# Patient Record
Sex: FEMALE | Race: WHITE | NOT HISPANIC OR LATINO | Employment: FULL TIME
[De-identification: names, ages, dates, MRNs, and addresses within clinical notes are randomized per-mention and may not be internally consistent; named-entity substitution may affect disease eponyms.]

---

## 2017-12-31 ENCOUNTER — HEALTH MAINTENANCE LETTER (OUTPATIENT)
Age: 32
End: 2017-12-31

## 2020-03-10 ENCOUNTER — HEALTH MAINTENANCE LETTER (OUTPATIENT)
Age: 35
End: 2020-03-10

## 2020-12-27 ENCOUNTER — HEALTH MAINTENANCE LETTER (OUTPATIENT)
Age: 35
End: 2020-12-27

## 2021-04-24 ENCOUNTER — HEALTH MAINTENANCE LETTER (OUTPATIENT)
Age: 36
End: 2021-04-24

## 2021-10-04 ENCOUNTER — HEALTH MAINTENANCE LETTER (OUTPATIENT)
Age: 36
End: 2021-10-04

## 2022-04-19 ENCOUNTER — TRANSCRIBE ORDERS (OUTPATIENT)
Dept: OTHER | Age: 37
End: 2022-04-19
Payer: COMMERCIAL

## 2022-04-19 DIAGNOSIS — G89.29 CHRONIC PAIN OF RIGHT KNEE: Primary | ICD-10-CM

## 2022-04-19 DIAGNOSIS — M25.561 CHRONIC PAIN OF RIGHT KNEE: Primary | ICD-10-CM

## 2022-05-15 ENCOUNTER — HEALTH MAINTENANCE LETTER (OUTPATIENT)
Age: 37
End: 2022-05-15

## 2022-09-11 ENCOUNTER — HEALTH MAINTENANCE LETTER (OUTPATIENT)
Age: 37
End: 2022-09-11

## 2023-04-13 ENCOUNTER — PRENATAL OFFICE VISIT (OUTPATIENT)
Dept: NURSING | Facility: CLINIC | Age: 38
End: 2023-04-13
Payer: COMMERCIAL

## 2023-04-13 VITALS — HEIGHT: 65 IN

## 2023-04-13 DIAGNOSIS — O09.519 ENCOUNTER FOR SUPERVISION OF PRIMIGRAVIDA OF ADVANCED MATERNAL AGE: Primary | ICD-10-CM

## 2023-04-13 DIAGNOSIS — O09.299 HISTORY OF MISCARRIAGE, CURRENTLY PREGNANT: ICD-10-CM

## 2023-04-13 PROBLEM — O02.1 MISSED AB: Status: ACTIVE | Noted: 2021-11-12

## 2023-04-13 PROCEDURE — 99207 PR NO CHARGE NURSE ONLY: CPT

## 2023-04-13 RX ORDER — SERTRALINE HYDROCHLORIDE 25 MG/1
TABLET, FILM COATED ORAL
COMMUNITY
Start: 2022-12-09 | End: 2023-09-21

## 2023-04-13 NOTE — PROGRESS NOTES
Important Information for Provider:     New ob nurse intake by phone, second pregnancy, LUKE KWONG 2021 at 7 weeks. Patient requesting earlier ultrasound. Ultrasound scheduled for 4/20/2023 with CNM to call with results. REGINAB with CNM 5/09/2023. Handouts reviewed. Ordered genetic screening.    Caffeine intake/servings daily - 0  Calcium intake/servings daily - 3  Exercise 5 times weekly - describe ; walks, bikes, precautions given  Sunscreen used - Yes  Seatbelts used - Yes  Guns stored in the home - No  Self Breast Exam - Yes  Pap test up to date -  Yes  Dental exam up to date -  Yes  Immunizations reviewed and up to date - Yes  Abuse: Current or Past (Physical, Sexual or Emotional) - Yes and No  Do you feel safe in your environment - Yes  Do you cope well with stress - Yes        Prenatal OB Questionnaire  Patient supplied answers from flow sheet for:  Prenatal OB Questionnaire.  Past Medical History  Have you ever recieved care for your mental health? : (!) Yes (Anxiety  in the past)  Have you ever been in a major accident or suffered serious trauma?: No  Within the last year, has anyone hit, slapped, kicked or otherwise hurt you?: No  In the last year, has anyone forced you to have sex when you didn't want to?: No    Past Medical History 2   Have you ever received a blood transfusion?: No  Would you accept a blood transfusion if was medically recommended?: Yes  Does anyone in your home smoke?: No   Is your blood type Rh negative?: Unknown  Have you ever ?: No  Have you been hospitalized for a nonsurgical reason excluding normal delivery?: No  Have you ever had an abnormal pap smear?: (!) Yes    Past Medical History (Continued)  Do you have a history of abnormalities of the uterus?: No  Did your mother take CHEYENNE or any other hormones when she was pregnant with you?: No  Do you have any other problems we have not asked about which you feel may be important to this pregnancy?: No            Allergies as of  4/13/2023:    Allergies as of 04/13/2023     (No Known Allergies)       Current medications are:  Current Outpatient Medications   Medication Sig Dispense Refill     Prenatal Vit-DSS-Fe Cbn-FA (PRENATAL AD PO) Take 1 tablet by mouth daily       sertraline (ZOLOFT) 25 MG tablet            Early ultrasound screening tool:    Does patient have irregular periods?  No  Did patient use hormonal birth control in the three months prior to positive urine pregnancy test? No  Is the patient breastfeeding?  No  Is the patient 10 weeks or greater at time of education visit?  No

## 2023-04-17 ENCOUNTER — HOSPITAL ENCOUNTER (EMERGENCY)
Facility: CLINIC | Age: 38
Discharge: HOME | End: 2023-04-18
Attending: STUDENT IN AN ORGANIZED HEALTH CARE EDUCATION/TRAINING PROGRAM | Admitting: STUDENT IN AN ORGANIZED HEALTH CARE EDUCATION/TRAINING PROGRAM
Payer: COMMERCIAL

## 2023-04-17 ENCOUNTER — NURSE TRIAGE (OUTPATIENT)
Dept: NURSING | Facility: CLINIC | Age: 38
End: 2023-04-17
Payer: COMMERCIAL

## 2023-04-17 VITALS
RESPIRATION RATE: 18 BRPM | HEIGHT: 65 IN | WEIGHT: 162 LBS | BODY MASS INDEX: 26.99 KG/M2 | SYSTOLIC BLOOD PRESSURE: 150 MMHG | DIASTOLIC BLOOD PRESSURE: 88 MMHG | TEMPERATURE: 98.2 F | HEART RATE: 90 BPM | OXYGEN SATURATION: 100 %

## 2023-04-17 DIAGNOSIS — O20.0 THREATENED MISCARRIAGE IN EARLY PREGNANCY: ICD-10-CM

## 2023-04-17 LAB
ABO/RH(D): NORMAL
ANION GAP SERPL CALCULATED.3IONS-SCNC: 14 MMOL/L (ref 7–15)
ANTIBODY SCREEN: NEGATIVE
BASOPHILS # BLD AUTO: 0 10E3/UL (ref 0–0.2)
BASOPHILS NFR BLD AUTO: 0 %
BUN SERPL-MCNC: 8.7 MG/DL (ref 6–20)
CALCIUM SERPL-MCNC: 9.6 MG/DL (ref 8.6–10)
CHLORIDE SERPL-SCNC: 102 MMOL/L (ref 98–107)
CREAT SERPL-MCNC: 0.76 MG/DL (ref 0.51–0.95)
DEPRECATED HCO3 PLAS-SCNC: 21 MMOL/L (ref 22–29)
EOSINOPHIL # BLD AUTO: 0 10E3/UL (ref 0–0.7)
EOSINOPHIL NFR BLD AUTO: 0 %
ERYTHROCYTE [DISTWIDTH] IN BLOOD BY AUTOMATED COUNT: 11.9 % (ref 10–15)
GFR SERPL CREATININE-BSD FRML MDRD: >90 ML/MIN/1.73M2
GLUCOSE SERPL-MCNC: 99 MG/DL (ref 70–99)
HCG INTACT+B SERPL-ACNC: ABNORMAL MIU/ML
HCT VFR BLD AUTO: 38.5 % (ref 35–47)
HGB BLD-MCNC: 13.1 G/DL (ref 11.7–15.7)
IMM GRANULOCYTES # BLD: 0 10E3/UL
IMM GRANULOCYTES NFR BLD: 0 %
LYMPHOCYTES # BLD AUTO: 2.4 10E3/UL (ref 0.8–5.3)
LYMPHOCYTES NFR BLD AUTO: 23 %
MCH RBC QN AUTO: 31.5 PG (ref 26.5–33)
MCHC RBC AUTO-ENTMCNC: 34 G/DL (ref 31.5–36.5)
MCV RBC AUTO: 93 FL (ref 78–100)
MONOCYTES # BLD AUTO: 0.9 10E3/UL (ref 0–1.3)
MONOCYTES NFR BLD AUTO: 8 %
NEUTROPHILS # BLD AUTO: 7.2 10E3/UL (ref 1.6–8.3)
NEUTROPHILS NFR BLD AUTO: 69 %
NRBC # BLD AUTO: 0 10E3/UL
NRBC BLD AUTO-RTO: 0 /100
PLATELET # BLD AUTO: 285 10E3/UL (ref 150–450)
POTASSIUM SERPL-SCNC: 3.9 MMOL/L (ref 3.4–5.3)
RBC # BLD AUTO: 4.16 10E6/UL (ref 3.8–5.2)
SODIUM SERPL-SCNC: 137 MMOL/L (ref 136–145)
SPECIMEN EXPIRATION DATE: NORMAL
WBC # BLD AUTO: 10.5 10E3/UL (ref 4–11)

## 2023-04-17 PROCEDURE — 250N000013 HC RX MED GY IP 250 OP 250 PS 637: Performed by: STUDENT IN AN ORGANIZED HEALTH CARE EDUCATION/TRAINING PROGRAM

## 2023-04-17 PROCEDURE — 84702 CHORIONIC GONADOTROPIN TEST: CPT | Performed by: STUDENT IN AN ORGANIZED HEALTH CARE EDUCATION/TRAINING PROGRAM

## 2023-04-17 PROCEDURE — 99284 EMERGENCY DEPT VISIT MOD MDM: CPT | Mod: 25 | Performed by: STUDENT IN AN ORGANIZED HEALTH CARE EDUCATION/TRAINING PROGRAM

## 2023-04-17 PROCEDURE — 80048 BASIC METABOLIC PNL TOTAL CA: CPT | Performed by: STUDENT IN AN ORGANIZED HEALTH CARE EDUCATION/TRAINING PROGRAM

## 2023-04-17 PROCEDURE — 86850 RBC ANTIBODY SCREEN: CPT | Performed by: STUDENT IN AN ORGANIZED HEALTH CARE EDUCATION/TRAINING PROGRAM

## 2023-04-17 PROCEDURE — 85025 COMPLETE CBC W/AUTO DIFF WBC: CPT | Performed by: STUDENT IN AN ORGANIZED HEALTH CARE EDUCATION/TRAINING PROGRAM

## 2023-04-17 PROCEDURE — 99285 EMERGENCY DEPT VISIT HI MDM: CPT | Performed by: STUDENT IN AN ORGANIZED HEALTH CARE EDUCATION/TRAINING PROGRAM

## 2023-04-17 PROCEDURE — 250N000011 HC RX IP 250 OP 636: Performed by: STUDENT IN AN ORGANIZED HEALTH CARE EDUCATION/TRAINING PROGRAM

## 2023-04-17 PROCEDURE — 99285 EMERGENCY DEPT VISIT HI MDM: CPT | Mod: 25 | Performed by: STUDENT IN AN ORGANIZED HEALTH CARE EDUCATION/TRAINING PROGRAM

## 2023-04-17 PROCEDURE — 36415 COLL VENOUS BLD VENIPUNCTURE: CPT | Performed by: STUDENT IN AN ORGANIZED HEALTH CARE EDUCATION/TRAINING PROGRAM

## 2023-04-17 RX ORDER — ACETAMINOPHEN 500 MG
1000 TABLET ORAL ONCE
Status: DISCONTINUED | OUTPATIENT
Start: 2023-04-17 | End: 2023-04-18 | Stop reason: HOSPADM

## 2023-04-17 RX ORDER — ONDANSETRON 4 MG/1
4 TABLET, ORALLY DISINTEGRATING ORAL ONCE
Status: COMPLETED | OUTPATIENT
Start: 2023-04-17 | End: 2023-04-17

## 2023-04-17 RX ADMIN — ONDANSETRON 4 MG: 4 TABLET, ORALLY DISINTEGRATING ORAL at 21:51

## 2023-04-17 ASSESSMENT — ACTIVITIES OF DAILY LIVING (ADL): ADLS_ACUITY_SCORE: 35

## 2023-04-17 NOTE — TELEPHONE ENCOUNTER
OB Triage Call      Is patient's OB/Midwife with the [facility] or [facility]? [facility]- Proceed with triage     Reason for call: Vaginal bleeding    Assessment: Bleeding. Started with spotting around noon. Started to flow heavier about 30 minutes ago. Having a lot of cramping - coming and going. Cramping started last night. 4-5/10 pain right now. Some lower back pain. Painful to urinate/push. Temp: 97.8    Plan: See HCP (OR PCP TRIAGE) Within 4 Hours. Advised Urgent care or ED. Patient stated understanding.         7w0d    Estimated Date of Delivery: Dec 4, 2023        OB History    Para Term  AB Living   1 0 0 0 0 0   SAB IAB Ectopic Multiple Live Births   0 0 0 0 0      # Outcome Date GA Lbr Dontrell/2nd Weight Sex Delivery Anes PTL Lv   1 Current                No results found for: GBS       Layne Stroud RN 23 6:51 PM  [facility] Nurse Advisor    Reason for Disposition    [1] Constant abdominal pain AND [2] present > 2 hours    Additional Information    Negative: [1] Vaginal bleeding AND [2] pregnant 20 or more weeks    Negative: Not pregnant or pregnancy status unknown    Negative: Shock suspected (e.g., cold/pale/clammy skin, too weak to stand, low BP, rapid pulse)    Negative: Difficult to awaken or acting confused (e.g., disoriented, slurred speech)    Negative: Passed out (i.e., lost consciousness, collapsed and was not responding)    Negative: Sounds like a life-threatening emergency to the triager    Negative: SEVERE abdominal pain    Negative: [1] SEVERE vaginal bleeding (e.g., soaking 2 pads / hour, large blood clots) AND [2] present 2 or more hours    Negative: SEVERE dizziness (e.g., unable to stand, requires support to walk, feels like passing out)    Negative: [1] MODERATE vaginal bleeding (e.g., soaking 1 pad per hour; clots) AND [2] present > 6 hours    Negative: [1] MODERATE vaginal bleeding (e.g., soaking 1 pad per hour; clots) AND [2] pregnant > 12 weeks    Negative:  Passed tissue (e.g., gray-white)    Negative: Shoulder pain    Negative: Pale skin (pallor) of new-onset or worsening    Negative: Patient sounds very sick or weak to the triager    Protocols used: PREGNANCY - VAGINAL BLEEDING LESS THAN 20 WEEKS EGA-A-

## 2023-04-18 ENCOUNTER — OFFICE VISIT (OUTPATIENT)
Dept: MIDWIFE SERVICES | Facility: CLINIC | Age: 38
End: 2023-04-18
Payer: COMMERCIAL

## 2023-04-18 ENCOUNTER — APPOINTMENT (OUTPATIENT)
Dept: ULTRASOUND IMAGING | Facility: CLINIC | Age: 38
End: 2023-04-18
Attending: STUDENT IN AN ORGANIZED HEALTH CARE EDUCATION/TRAINING PROGRAM
Payer: COMMERCIAL

## 2023-04-18 VITALS
OXYGEN SATURATION: 95 % | SYSTOLIC BLOOD PRESSURE: 118 MMHG | WEIGHT: 166 LBS | DIASTOLIC BLOOD PRESSURE: 69 MMHG | HEART RATE: 80 BPM | BODY MASS INDEX: 27.62 KG/M2

## 2023-04-18 DIAGNOSIS — O26.899 RH NEGATIVE, ANTEPARTUM: ICD-10-CM

## 2023-04-18 DIAGNOSIS — O20.9 VAGINAL BLEEDING IN PREGNANCY, FIRST TRIMESTER: Primary | ICD-10-CM

## 2023-04-18 DIAGNOSIS — Z67.91 RH NEGATIVE, ANTEPARTUM: ICD-10-CM

## 2023-04-18 PROCEDURE — 76801 OB US < 14 WKS SINGLE FETUS: CPT

## 2023-04-18 PROCEDURE — 93976 VASCULAR STUDY: CPT | Mod: 26 | Performed by: RADIOLOGY

## 2023-04-18 PROCEDURE — 76801 OB US < 14 WKS SINGLE FETUS: CPT | Mod: 26 | Performed by: RADIOLOGY

## 2023-04-18 PROCEDURE — 99203 OFFICE O/P NEW LOW 30 MIN: CPT | Mod: 25 | Performed by: ADVANCED PRACTICE MIDWIFE

## 2023-04-18 PROCEDURE — 96372 THER/PROPH/DIAG INJ SC/IM: CPT | Performed by: ADVANCED PRACTICE MIDWIFE

## 2023-04-18 PROCEDURE — 76817 TRANSVAGINAL US OBSTETRIC: CPT | Mod: 26 | Performed by: RADIOLOGY

## 2023-04-18 RX ORDER — ONDANSETRON 4 MG/1
4 TABLET, ORALLY DISINTEGRATING ORAL EVERY 6 HOURS PRN
Qty: 12 TABLET | Refills: 0 | Status: SHIPPED | OUTPATIENT
Start: 2023-04-18 | End: 2023-04-20

## 2023-04-18 ASSESSMENT — ACTIVITIES OF DAILY LIVING (ADL): ADLS_ACUITY_SCORE: 35

## 2023-04-18 NOTE — PROGRESS NOTES
S: Patient presents to clinic for Rhogam after being seen in ED last night for vaginal bleeding in first trimester of pregnancy accompanied by cramping. First noticed spotting yesterday around noon, then at 6pm had a large amount of blood along with worsening cramping. Since then bleeding has resolved and cramping is mild and intermittent.     O: /69   Pulse 80   Wt 75.3 kg (166 lb)   LMP 02/27/2023   SpO2 95%   BMI 27.62 kg/m      General: well appearing, in NAD  Cardiac: well perfused  Respiratory: non-labored breathing on room air   Psych: alert and oriented     Ultrasound results from ED on 4/17:   IMPRESSION:      1.  Single living intrauterine embryo with ultrasound gestational age  of 7 weeks, 0 days corresponding to ultrasound estimated date of  delivery of 12/6/2023.   2.  Small subchorionic hemorrhage.    A/P:   (O20.9) Vaginal bleeding in pregnancy, first trimester  (primary encounter diagnosis)  Comment: Discussed possible outcomes but currently imaging and resolution of symptoms is reassuring. Has an ultrasound scheduled for Thursday. Will plan to keep this for close follow up. Has an US scheduled for 5/9 as well but discussed could schedule sooner than that as needed. Has phone numbers and explained that she can always request to have on call CNM paged to discuss questions instead of going to ED as she was wondering if she really needed to go last night (although imaging was reassuring and helpful to her).     (O26.899,  Z67.91) Rh negative status during pregnancy  Comment: Given today in clinic as it wasn't available on [employer].   Plan: rho(D) immune globulin (RHOGAM) injection 300         mcg         RTC as scheduled. Call with any questions or concerns.   Holley Ernandez CNM

## 2023-04-18 NOTE — DISCHARGE INSTRUCTIONS
Please make an appointment to follow up with OB/Gyn - [facility] (phone: [phone]) in 1-2 days.    If you cannot be seen in clinic in this timeline, please go to the [facility] ED on [address] to receive RhoGam.

## 2023-04-18 NOTE — ED TRIAGE NOTES
Pt walk in from home with abdominal cramps and bleeding at 7 weeks pregnant since 1200 today.  Endorses nausea, denies V/D, or bloody stools.  VSS, pt is alert x 4 and in no apparent distress.

## 2023-04-18 NOTE — ED PROVIDER NOTES
[place] EMERGENCY DEPARTMENT ([facility])    23      History     Chief Complaint   Patient presents with     Threatened Miscarriage     The history is provided by the patient and medical records.     Kelly Wakefield is a 38 year old  female at 7w per patient who presents to the Emergency Department with abdominal pain and vaginal bleeding. Patient reports she developed pelvic/hypogastric abdominal cramping last night. She then developed some spotting around noon today, notes blood was brown and only noted when she used the restroom. Patient reports around 6 pm tonight she was sitting on the couch when she noted a gush of blood. She continues to have actively bleeding but not as much as the episode at 6. No noted tissue or large clots in the blood. Patient reports she currently having abdominal/pelvic pain that she rates 4-5 out of 10. She did not take any medications at home. Patient reports nausea today and she was dizzy on the car right here to the ED. Patient notes she had a miscarriage at 7 week with her last pregnancy, did not have any symptoms at that time. Patient reports she has been urinating more frequently but attributes this to pregnancy. She notes today when urinating she had increased pelvic pain. She has had a phone visit for prenatal care but has not had an ultrasound. This is scheduled for . Patient is Rh negative.    Past Medical History  Past Medical History:   Diagnosis Date     Varicella      Past Surgical History:   Procedure Laterality Date     APPENDECTOMY       COLONOSCOPY       COLPOSCOPY       DILATION AND CURETTAGE       FOOT SURGERY Right      ondansetron (ZOFRAN ODT) 4 MG ODT tab  Prenatal Vit-DSS-Fe Cbn-FA (PRENATAL AD PO)  sertraline (ZOLOFT) 25 MG tablet      No Known Allergies  Family History  Family History   Problem Relation Age of Onset     Thyroid Disease Mother      Breast Cancer Mother      Hypertension Mother      Kidney Disease Father   "    Heart Disease Father      Hypertension Father      Idiopathic pulmonary fibrosis Father      Kidney Cancer Father      Parkinsonism Maternal Grandmother      Leukemia Paternal Grandmother      Esophageal Cancer Paternal Grandfather      Cancer No family hx of         no family hx of skin cancer     Social History   Social History     Tobacco Use     Smoking status: Some Days     Smokeless tobacco: Never   Vaping Use     Vaping status: Never Used   Substance Use Topics     Drug use: Never         A medically appropriate review of systems was performed with pertinent positives and negatives noted in the HPI, and all other systems negative.    Physical Exam   BP: (!) 150/88  Pulse: 90  Temp: 98.2  F (36.8  C)  Resp: 18  Height: 165.1 cm (5' 5\")  Weight: 73.5 kg (162 lb)  SpO2: 100 %  Physical Exam  Vital Signs Reviewed  Gen: Well nourished, well developed, resting comfortably, no acute distress  HEENT: NC/AT, PERRL, EOMI, MMM  Neck: Supple, FROM  CV: Regular Rate  Lungs/Chest: Normal Effort  Abd: Non-distended, non-tender  : Deferred  MSK/Back: FROM, no visible deformity  Neuro: A&Ox3, GCS 15, CN II-XII unremarkable. Strength and sensation globally intact.  Skin: Warm, Dry, Intact, no visible lesions      ED Course, Procedures, & Data     ED Course as of 04/18/23 0306   Mon Apr 17, 2023   2248 Patient feeling somewhat better, pain at tolerable level. Declines oxy. HCG pending still.   Tue Apr 18, 2023   0145 US results discussed at bedside. Unfortunately rhogam only comes from [facility] and would take >1 hour to arrive. Discussed with gynecology on call, patient under 8 weeks and likely can follow-up in clinic promptly. Reasonable for her to go home tonight and follow up in clinic. After SDM with patient, will discharge and follow up. Instructed to go to [facility] ED if cannot follow up promptly in clinic.      9:25 PM  The patient was seen and examined by Kevin Sexton MD in Triage.     Procedures          "            Results for orders placed or performed during the hospital encounter of 04/17/23   US OB 1st Trimester W Transvaginal W Doppler     Status: None (Preliminary result)    Impression    RESIDENT PRELIMINARY INTERPRETATION  IMPRESSION:      1.  There is a single living intrauterine embryo with ultrasound  gestational age of 7 weeks, 0 days corresponding to ultrasound  estimated date of delivery of 12/6/2023.   2.  Small subchorionic hemorrhage.   Basic metabolic panel     Status: Abnormal   Result Value Ref Range    Sodium 137 136 - 145 mmol/L    Potassium 3.9 3.4 - 5.3 mmol/L    Chloride 102 98 - 107 mmol/L    Carbon Dioxide (CO2) 21 (L) 22 - 29 mmol/L    Anion Gap 14 7 - 15 mmol/L    Urea Nitrogen 8.7 6.0 - 20.0 mg/dL    Creatinine 0.76 0.51 - 0.95 mg/dL    Calcium 9.6 8.6 - 10.0 mg/dL    Glucose 99 70 - 99 mg/dL    GFR Estimate >90 >60 mL/min/1.73m2   HCG quantitative pregnancy     Status: Abnormal   Result Value Ref Range    hCG Quantitative 53,362 (H) <5 mIU/mL   CBC with platelets and differential     Status: None   Result Value Ref Range    WBC Count 10.5 4.0 - 11.0 10e3/uL    RBC Count 4.16 3.80 - 5.20 10e6/uL    Hemoglobin 13.1 11.7 - 15.7 g/dL    Hematocrit 38.5 35.0 - 47.0 %    MCV 93 78 - 100 fL    MCH 31.5 26.5 - 33.0 pg    MCHC 34.0 31.5 - 36.5 g/dL    RDW 11.9 10.0 - 15.0 %    Platelet Count 285 150 - 450 10e3/uL    % Neutrophils 69 %    % Lymphocytes 23 %    % Monocytes 8 %    % Eosinophils 0 %    % Basophils 0 %    % Immature Granulocytes 0 %    NRBCs per 100 WBC 0 <1 /100    Absolute Neutrophils 7.2 1.6 - 8.3 10e3/uL    Absolute Lymphocytes 2.4 0.8 - 5.3 10e3/uL    Absolute Monocytes 0.9 0.0 - 1.3 10e3/uL    Absolute Eosinophils 0.0 0.0 - 0.7 10e3/uL    Absolute Basophils 0.0 0.0 - 0.2 10e3/uL    Absolute Immature Granulocytes 0.0 <=0.4 10e3/uL    Absolute NRBCs 0.0 10e3/uL   Adult Type and Screen     Status: None   Result Value Ref Range    ABO/RH(D) O NEG     Antibody Screen Negative  Negative    SPECIMEN EXPIRATION DATE [id number]    CBC with platelets differential     Status: None    Narrative    The following orders were created for panel order CBC with platelets differential.  Procedure                               Abnormality         Status                     ---------                               -----------         ------                     CBC with platelets and d...[[id number]]                      Final result                 Please view results for these tests on the individual orders.   ABO/Rh type and screen     Status: None    Narrative    The following orders were created for panel order ABO/Rh type and screen.  Procedure                               Abnormality         Status                     ---------                               -----------         ------                     Adult Type and Screen[[id number]]                            Final result                 Please view results for these tests on the individual orders.     Medications   acetaminophen (TYLENOL) tablet 1,000 mg ( Oral Canceled Entry 4/17/23 2151)   ondansetron (ZOFRAN ODT) ODT tab 4 mg (4 mg Oral $Given 4/17/23 2151)     Labs Ordered and Resulted from Time of ED Arrival to Time of ED Departure   BASIC METABOLIC PANEL - Abnormal       Result Value    Sodium 137      Potassium 3.9      Chloride 102      Carbon Dioxide (CO2) 21 (*)     Anion Gap 14      Urea Nitrogen 8.7      Creatinine 0.76      Calcium 9.6      Glucose 99      GFR Estimate >90     HCG QUANTITATIVE PREGNANCY - Abnormal    hCG Quantitative 53,362 (*)    CBC WITH PLATELETS AND DIFFERENTIAL    WBC Count 10.5      RBC Count 4.16      Hemoglobin 13.1      Hematocrit 38.5      MCV 93      MCH 31.5      MCHC 34.0      RDW 11.9      Platelet Count 285      % Neutrophils 69      % Lymphocytes 23      % Monocytes 8      % Eosinophils 0      % Basophils 0      % Immature Granulocytes 0      NRBCs per 100 WBC 0      Absolute Neutrophils 7.2       Absolute Lymphocytes 2.4      Absolute Monocytes 0.9      Absolute Eosinophils 0.0      Absolute Basophils 0.0      Absolute Immature Granulocytes 0.0      Absolute NRBCs 0.0     TYPE AND SCREEN, ADULT    ABO/RH(D) O NEG      Antibody Screen Negative      SPECIMEN EXPIRATION DATE [id number]     RH IMMUNE GLOBULIN SCREEN   ABO/RH TYPE AND SCREEN     US OB 1st Trimester W Transvaginal W Doppler   Preliminary Result   RESIDENT PRELIMINARY INTERPRETATION   IMPRESSION:       1.  There is a single living intrauterine embryo with ultrasound   gestational age of 7 weeks, 0 days corresponding to ultrasound   estimated date of delivery of 12/6/2023.    2.  Small subchorionic hemorrhage.             Critical care was not performed.     Medical Decision Making  The patient's presentation was of high complexity (an acute health issue posing potential threat to life or bodily function).    The patient's evaluation involved:  ordering and/or review of 3+ test(s) in this encounter (see separate area of note for details)  discussion of management or test interpretation with another health professional (see separate area of note for details)    The patient's management necessitated moderate risk (prescription drug management including medications given in the ED).      Assessment & Plan    Patient is a [age]-year-old female, G2 A1, presenting the emergency department with abdominal pain and vaginal bleeding roughly 7 weeks and a current gestation.  Symptoms began last night with mild abdominal cramping.  Noted to have some spotting turning into a gush of blood.  Bleeding has since begun to subside and has essentially stopped at presentation to the emergency department.  Still with some mild pelvic cramping.  She had a slightly elevated blood pressure in triage other vital signs reassuring.  No symptoms of severe anemia or hemorrhagic shock.  Concern is for threatened miscarriage.  Unfortunately space limited in the emergency  department, discussed deferring pelvic exam to labs and imaging and then reassessing patient.  She was comfortable with this plan.    Patient's blood type is O-.  She does not have a significant anemia on her CBC.  Her metabolic panel is reassuring without an anion gap.  Bicarb was slightly low at 21, unclear significance but does not appear to represent an acute life-threatening process.  Clinically she is not septic and she has no strong infectious symptoms.  hCG was elevated, pelvic ultrasound obtained.  Preliminary read shows a single IUP at 7 weeks, small subchorionic hemorrhage was seen.  Discussed the read with the radiology resident who stated it appears the cervix is closed although there is a small amount of fluid.    I discussed the ultrasound results with the patient in a hallway chair due to capacity issues in the department.  We discussed that cervix appears closed and bleeding has essentially stopped.  Discussed that pelvic exam would not , may give some additional certainty as to whether or not the cervix is truly closed.  Patient stated she would like to defer the pelvic exam at this time and would rather go home. We discussed RhoGAM given her O- blood type.  We were going to order this for the patient however it unfortunately is apparently only on the [facility] facilities and would take over 1 hour to come to this hospital.    I discussed this with gynecology on-call.  Discussed recent move away from RhoGAM in 8-week or under pregnancies, timing to administer it within 1 to 2 days of symptoms.  Patient was offered the option of staying in the emergency department versus my assessment that he would otherwise be safe and appropriate for her to attempt prompt follow-up in obstetrics clinic.  The patient elected to be seen in obstetrics clinic.  She was made aware that if for what ever reason she cannot promptly be seen in clinic she should go to the [facility]  emergency department to receive the RhoGAM.    Patient will discharge with Tylenol for pain, Zofran for nausea.  She will follow-up with obstetrics in the next 1 to 2 days.  Return precautions for worsening bleeding pain or any other concerning symptoms were discussed.  Instructions to go to [facility] emergency department for RhoGAM if not able to be promptly seen in obstetrics clinic.  No questions comments or concerns at time of discharge.      I have reviewed the nursing notes. I have reviewed the findings, diagnosis, plan and need for follow up with the patient.    Discharge Medication List as of 4/18/2023  1:49 AM      START taking these medications    Details   ondansetron (ZOFRAN ODT) 4 MG ODT tab Take 1 tablet (4 mg) by mouth every 6 hours as needed for nausea or vomiting, Disp-12 tablet, R-0, E-Prescribe             Final diagnoses:   Threatened miscarriage in early pregnancy     IBonnie, am serving as a trained medical scribe to document services personally performed by Kevin Veliz MD, based on the provider's statements to me.     I, Kevin Veliz MD, was physically present and have reviewed and verified the accuracy of this note documented by Bonnie Davis.    Kevin Veliz Jr., MD   [facility] EMERGENCY DEPARTMENT  4/17/2023     Kevin Veliz MD  04/18/23 0316

## 2023-04-20 ENCOUNTER — ANCILLARY PROCEDURE (OUTPATIENT)
Dept: ULTRASOUND IMAGING | Facility: CLINIC | Age: 38
End: 2023-04-20
Attending: ADVANCED PRACTICE MIDWIFE
Payer: COMMERCIAL

## 2023-04-20 ENCOUNTER — VIRTUAL VISIT (OUTPATIENT)
Dept: MIDWIFE SERVICES | Facility: CLINIC | Age: 38
End: 2023-04-20
Payer: COMMERCIAL

## 2023-04-20 ENCOUNTER — TRANSCRIBE ORDERS (OUTPATIENT)
Dept: MATERNAL FETAL MEDICINE | Facility: CLINIC | Age: 38
End: 2023-04-20

## 2023-04-20 DIAGNOSIS — O09.511 PRIMIGRAVIDA OF ADVANCED MATERNAL AGE IN FIRST TRIMESTER: ICD-10-CM

## 2023-04-20 DIAGNOSIS — O26.90 PREGNANCY RELATED CONDITION, ANTEPARTUM: Primary | ICD-10-CM

## 2023-04-20 DIAGNOSIS — O20.9 VAGINAL BLEEDING IN PREGNANCY, FIRST TRIMESTER: Primary | ICD-10-CM

## 2023-04-20 DIAGNOSIS — O09.519 ENCOUNTER FOR SUPERVISION OF PRIMIGRAVIDA OF ADVANCED MATERNAL AGE: ICD-10-CM

## 2023-04-20 PROCEDURE — 76801 OB US < 14 WKS SINGLE FETUS: CPT

## 2023-04-20 PROCEDURE — 99213 OFFICE O/P EST LOW 20 MIN: CPT | Mod: 93 | Performed by: ADVANCED PRACTICE MIDWIFE

## 2023-04-20 NOTE — PROGRESS NOTES
"Kelly is a 38 year old who is being evaluated via a billable telephone visit.      What phone number would you like to be contacted at? [phone]  How would you like to obtain your AVS? Lui    Distant Location (provider location):  On-site   Patient Location:  At work due to need to be called prior to 1:30 due to work meeting.    Assessment & Plan     (O20.9) Vaginal bleeding in pregnancy, first trimester  (primary encounter diagnosis)  Comment: Hx of bleeding this pregnancy with HOLLIE  Plan: [facility] Referral - Pregnancy        Desires genetic counseling when available    (O09.511) Primigravida of advanced maternal age in first trimester  Comment: Hx of 7 wk loss with embryonic demise.  Plan: [facility] Referral - Pregnancy              Review of the result(s) of each unique test - US  20 minutes spent by me on the date of the encounter doing chart review, history and exam, documentation and further activities per the note       Nicotine/Tobacco Cessation:  She quit smoking in 2020.  No one in the home smokes per RN assessment.    Nicotine/Tobacco Cessation Plan:   Review at in person NOB visit      BMI:   Estimated body mass index is 27.62 kg/m  as calculated from the following:    Height as of 4/17/23: 1.651 m (5' 5\").    Weight as of 4/18/23: 75.3 kg (166 lb).       Assess for total wt gain review at NOB    No follow-ups on file.    Molina Omalley, RAJ MCKNIGHT  [facility]    Subjective   Kelly is a 38 year old, presenting for the following health issues:  Consult (Review ultrasound)    HPI     Kelly is pregnant with a hx of previous loss at 7 wks.  Bleeding at 7 wks this pregnancy so as expected anxious about US results.  Bleeding has decreased over the last 24 hrs along with the cramping.        Review of Systems   CONSTITUTIONAL:NEGATIVE for fever, chills, change in weight  PSYCHIATRIC: NEGATIVE for changes in mood or affect      Objective           Vitals:  No vitals " were obtained today due to virtual visit.    Physical Exam   healthy, alert and mild distress  PSYCH: Alert and oriented times 3; coherent speech, normal   rate and volume, able to articulate logical thoughts, able   to abstract reason, no tangential thoughts, no hallucinations   or delusions  Her affect is anxious  RESP: No cough, no audible wheezing, able to talk in full sentences  Remainder of exam unable to be completed due to telephone visits    Results for orders placed or performed in visit on 04/20/23 (from the past 24 hour(s))   US OB <14 Weeks w Transvaginal Single    Narrative    EXAM: US OB < 14 WEEKS SINGLE-TRANSABDOMINAL  LOCATION: [facility]  DATE/TIME: 4/20/2023 9:17 AM CDT    INDICATION:  Encounter for supervision of primigravida of advanced maternal age.  COMPARISON: None.  TECHNIQUE: Transabdominal scans were performed. Endovaginal ultrasound was performed to better visualize the embryo.    FINDINGS:  UTERUS: Single normal appearing intrauterine gestation sac.  CRL: Measures 0.9 cm, equals 7 weeks 0 days.  FETAL HEART RATE: 150 bpm.  AMNIOTIC FLUID: Normal.  PLACENTA: Not yet formed, 1.9 x 0.4 x 0.5 cm subchorionic hemorrhage.    RIGHT OVARY: Normal.  LEFT OVARY: Normal.      Impression    IMPRESSION:   1.  Single living intrauterine gestation at 9 weeks 0 days, EDC 12/07/2023.  2.  1.9 x 0.4 x 0.5 cm subchorionic hemorrhage.                   LMP 02/27/2023     SANDRA 12/5/23    7 3/7 today    US 4/18/23  There is an embryo and yolk sac present within the gestational  sac. The embryo has a crown-rump length of 2.0 mm corresponding to 7  weeks, 0 days gestation. Embryonic heart motion is present at 115  beats per minute.  It is too early to accurately determine placental  site. Small subchorionic hemorrhage.    US 4/20/23  FINDINGS:  UTERUS: Single normal appearing intrauterine gestation sac.  CRL: Measures 0.9 cm, equals 7 weeks 0 days.  FETAL HEART RATE: 150 bpm.  AMNIOTIC  FLUID: Normal.  PLACENTA: Not yet formed, 1.9 x 0.4 x 0.5 cm subchorionic hemorrhage.    A/P:  Reviewed with Kelly that while the FHT raising is a positive sign and the decrease in bleeding is not possible at this time to definitive that this is an established pregnancy until after 10 weeks.  At this time we have a viable pregnancy but can not predict a probable successful outcome at this time.  Follow up US as ordered is still planned prior to NOB visit in 3 weeks.      Phone call duration: 20 minutes

## 2023-04-26 ENCOUNTER — MYC MEDICAL ADVICE (OUTPATIENT)
Dept: MIDWIFE SERVICES | Facility: CLINIC | Age: 38
End: 2023-04-26
Payer: COMMERCIAL

## 2023-04-27 ENCOUNTER — PRENATAL OFFICE VISIT (OUTPATIENT)
Dept: MIDWIFE SERVICES | Facility: CLINIC | Age: 38
End: 2023-04-27
Payer: COMMERCIAL

## 2023-04-27 VITALS
HEART RATE: 84 BPM | DIASTOLIC BLOOD PRESSURE: 67 MMHG | WEIGHT: 165.8 LBS | TEMPERATURE: 97.1 F | BODY MASS INDEX: 27.59 KG/M2 | SYSTOLIC BLOOD PRESSURE: 105 MMHG

## 2023-04-27 DIAGNOSIS — R39.9 URINARY TRACT INFECTION SYMPTOMS: Primary | ICD-10-CM

## 2023-04-27 LAB
ALBUMIN UR-MCNC: NEGATIVE MG/DL
APPEARANCE UR: CLEAR
BACTERIA #/AREA URNS HPF: ABNORMAL /HPF
BILIRUB UR QL STRIP: NEGATIVE
CLUE CELLS: ABNORMAL
COLOR UR AUTO: YELLOW
GLUCOSE UR STRIP-MCNC: NEGATIVE MG/DL
HGB UR QL STRIP: NEGATIVE
KETONES UR STRIP-MCNC: NEGATIVE MG/DL
LEUKOCYTE ESTERASE UR QL STRIP: NEGATIVE
MUCOUS THREADS #/AREA URNS LPF: PRESENT /LPF
NITRATE UR QL: NEGATIVE
PH UR STRIP: 6.5 [PH] (ref 5–7)
RBC #/AREA URNS AUTO: ABNORMAL /HPF
SP GR UR STRIP: 1.01 (ref 1–1.03)
SQUAMOUS #/AREA URNS AUTO: ABNORMAL /LPF
TRICHOMONAS, WET PREP: ABNORMAL
UROBILINOGEN UR STRIP-ACNC: 0.2 E.U./DL
WBC #/AREA URNS AUTO: ABNORMAL /HPF
WBC'S/HIGH POWER FIELD, WET PREP: ABNORMAL
YEAST, WET PREP: ABNORMAL

## 2023-04-27 PROCEDURE — 87210 SMEAR WET MOUNT SALINE/INK: CPT | Performed by: ADVANCED PRACTICE MIDWIFE

## 2023-04-27 PROCEDURE — 99213 OFFICE O/P EST LOW 20 MIN: CPT | Performed by: ADVANCED PRACTICE MIDWIFE

## 2023-04-27 PROCEDURE — 87491 CHLMYD TRACH DNA AMP PROBE: CPT | Performed by: ADVANCED PRACTICE MIDWIFE

## 2023-04-27 PROCEDURE — 81001 URINALYSIS AUTO W/SCOPE: CPT | Performed by: ADVANCED PRACTICE MIDWIFE

## 2023-04-27 PROCEDURE — 87591 N.GONORRHOEAE DNA AMP PROB: CPT | Performed by: ADVANCED PRACTICE MIDWIFE

## 2023-04-27 ASSESSMENT — ANXIETY QUESTIONNAIRES
5. BEING SO RESTLESS THAT IT IS HARD TO SIT STILL: NOT AT ALL
7. FEELING AFRAID AS IF SOMETHING AWFUL MIGHT HAPPEN: NOT AT ALL
IF YOU CHECKED OFF ANY PROBLEMS ON THIS QUESTIONNAIRE, HOW DIFFICULT HAVE THESE PROBLEMS MADE IT FOR YOU TO DO YOUR WORK, TAKE CARE OF THINGS AT HOME, OR GET ALONG WITH OTHER PEOPLE: NOT DIFFICULT AT ALL
GAD7 TOTAL SCORE: 1
3. WORRYING TOO MUCH ABOUT DIFFERENT THINGS: NOT AT ALL
1. FEELING NERVOUS, ANXIOUS, OR ON EDGE: NOT AT ALL
2. NOT BEING ABLE TO STOP OR CONTROL WORRYING: NOT AT ALL
GAD7 TOTAL SCORE: 1
6. BECOMING EASILY ANNOYED OR IRRITABLE: SEVERAL DAYS

## 2023-04-27 ASSESSMENT — PATIENT HEALTH QUESTIONNAIRE - PHQ9
5. POOR APPETITE OR OVEREATING: NOT AT ALL
SUM OF ALL RESPONSES TO PHQ QUESTIONS 1-9: 1

## 2023-04-27 NOTE — PROGRESS NOTES
"S: Kelly is a 39yo  at 8w3d. She is here with c/o urinary frequency, and feeling uncomfortable/aching sensation in her low belly yesterday. She had these symptoms yesterday, and they are much better today. It was a stressful day at work, so she isn't sure if that contributed. She denies vaginal itching, but thinks that her odor may have changed some since pregnancy. Her discharge is clear/cloudy. She has been struggling with constipation, taking psyllium, but wondering if there is something else that is safe in early pregnancy. Not having nausea excessively, just feeling a bit like she has \"motion sickness\".     O:/67   Pulse 84   Temp 97.1  F (36.2  C) (Temporal)   Wt 75.2 kg (165 lb 12.8 oz)   LMP 2023   Breastfeeding No   BMI 27.59 kg/m    Exam:  Constitutional: healthy, alert and no distress  Psychiatric: mentation appears normal and affect normal/bright  Wet prep: neg  GC/CT: pending      8:46 AM      Color Urine Colorless, Straw, Light Yellow, Yellow Yellow     Appearance Urine Clear Clear     Glucose Urine Negative mg/dL Negative     Bilirubin Urine Negative Negative     Ketones Urine Negative mg/dL Negative     Specific Gravity Urine 1.003 - 1.035 1.015     Blood Urine Negative Negative     pH Urine 5.0 - 7.0 6.5     Protein Albumin Urine Negative mg/dL Negative     Urobilinogen Urine 0.2, 1.0 E.U./dL 0.2     Nitrite Urine Negative Negative     Leukocyte Esterase Urine Negative Negative      A/P:  (R39.9) Urinary tract infection symptoms  (primary encounter diagnosis)  Plan: UA with Microscopic reflex to Culture - lab         collect, UA Microscopic with Reflex to Culture,        Wet prep - Clinic Collect, NEISSERIA GONORRHOEA        PCR, CHLAMYDIA TRACHOMATIS PCR    Discussed adding stool softener, she plans to get over the counter. Discussed that she should avoid laxatives.    Since UA is neg, and symptoms are improved, no intervention needed at this time.    Return to care in 2 " weeks for new OB visit.    Jerry Cho CNM

## 2023-04-28 LAB
C TRACH DNA SPEC QL NAA+PROBE: NEGATIVE
N GONORRHOEA DNA SPEC QL NAA+PROBE: NEGATIVE

## 2023-05-08 LAB
ABO/RH(D): ABNORMAL
ANTIBODY SCREEN: POSITIVE
SPECIMEN EXPIRATION DATE: ABNORMAL

## 2023-05-09 ENCOUNTER — ANCILLARY PROCEDURE (OUTPATIENT)
Dept: ULTRASOUND IMAGING | Facility: CLINIC | Age: 38
End: 2023-05-09
Payer: COMMERCIAL

## 2023-05-09 ENCOUNTER — PRENATAL OFFICE VISIT (OUTPATIENT)
Dept: MIDWIFE SERVICES | Facility: CLINIC | Age: 38
End: 2023-05-09
Payer: COMMERCIAL

## 2023-05-09 VITALS
DIASTOLIC BLOOD PRESSURE: 75 MMHG | WEIGHT: 167.1 LBS | BODY MASS INDEX: 27.84 KG/M2 | HEIGHT: 65 IN | SYSTOLIC BLOOD PRESSURE: 116 MMHG | HEART RATE: 74 BPM

## 2023-05-09 DIAGNOSIS — O09.519 ENCOUNTER FOR SUPERVISION OF PRIMIGRAVIDA OF ADVANCED MATERNAL AGE: ICD-10-CM

## 2023-05-09 DIAGNOSIS — Z3A.10 10 WEEKS GESTATION OF PREGNANCY: ICD-10-CM

## 2023-05-09 DIAGNOSIS — O09.521 AMA (ADVANCED MATERNAL AGE) MULTIGRAVIDA 35+, FIRST TRIMESTER: Primary | ICD-10-CM

## 2023-05-09 LAB
ALBUMIN UR-MCNC: NEGATIVE MG/DL
ANTIBODY ID: NORMAL
APPEARANCE UR: CLEAR
BILIRUB UR QL STRIP: NEGATIVE
COLOR UR AUTO: YELLOW
ERYTHROCYTE [DISTWIDTH] IN BLOOD BY AUTOMATED COUNT: 11.7 % (ref 10–15)
GLUCOSE UR STRIP-MCNC: NEGATIVE MG/DL
HBV SURFACE AG SERPL QL IA: NONREACTIVE
HCT VFR BLD AUTO: 37.5 % (ref 35–47)
HCV AB SERPL QL IA: NONREACTIVE
HGB BLD-MCNC: 12.9 G/DL (ref 11.7–15.7)
HGB UR QL STRIP: NEGATIVE
HIV 1+2 AB+HIV1 P24 AG SERPL QL IA: NONREACTIVE
KETONES UR STRIP-MCNC: NEGATIVE MG/DL
LEUKOCYTE ESTERASE UR QL STRIP: NEGATIVE
MCH RBC QN AUTO: 31.5 PG (ref 26.5–33)
MCHC RBC AUTO-ENTMCNC: 34.4 G/DL (ref 31.5–36.5)
MCV RBC AUTO: 92 FL (ref 78–100)
NITRATE UR QL: NEGATIVE
PH UR STRIP: 5.5 [PH] (ref 5–7)
PLATELET # BLD AUTO: 271 10E3/UL (ref 150–450)
RBC # BLD AUTO: 4.09 10E6/UL (ref 3.8–5.2)
SP GR UR STRIP: <=1.005 (ref 1–1.03)
SPECIMEN EXPIRATION DATE: NORMAL
T PALLIDUM AB SER QL: NONREACTIVE
TSH SERPL DL<=0.005 MIU/L-ACNC: 0.98 UIU/ML (ref 0.3–4.2)
UROBILINOGEN UR STRIP-ACNC: 0.2 E.U./DL
WBC # BLD AUTO: 10.3 10E3/UL (ref 4–11)

## 2023-05-09 PROCEDURE — 87389 HIV-1 AG W/HIV-1&-2 AB AG IA: CPT | Performed by: ADVANCED PRACTICE MIDWIFE

## 2023-05-09 PROCEDURE — 36415 COLL VENOUS BLD VENIPUNCTURE: CPT | Performed by: ADVANCED PRACTICE MIDWIFE

## 2023-05-09 PROCEDURE — 86900 BLOOD TYPING SEROLOGIC ABO: CPT | Performed by: ADVANCED PRACTICE MIDWIFE

## 2023-05-09 PROCEDURE — 76801 OB US < 14 WKS SINGLE FETUS: CPT | Performed by: OBSTETRICS & GYNECOLOGY

## 2023-05-09 PROCEDURE — 86850 RBC ANTIBODY SCREEN: CPT | Performed by: ADVANCED PRACTICE MIDWIFE

## 2023-05-09 PROCEDURE — 86780 TREPONEMA PALLIDUM: CPT | Performed by: ADVANCED PRACTICE MIDWIFE

## 2023-05-09 PROCEDURE — 87086 URINE CULTURE/COLONY COUNT: CPT | Performed by: ADVANCED PRACTICE MIDWIFE

## 2023-05-09 PROCEDURE — 85027 COMPLETE CBC AUTOMATED: CPT | Performed by: ADVANCED PRACTICE MIDWIFE

## 2023-05-09 PROCEDURE — 84443 ASSAY THYROID STIM HORMONE: CPT | Performed by: ADVANCED PRACTICE MIDWIFE

## 2023-05-09 PROCEDURE — 87340 HEPATITIS B SURFACE AG IA: CPT | Performed by: ADVANCED PRACTICE MIDWIFE

## 2023-05-09 PROCEDURE — 86803 HEPATITIS C AB TEST: CPT | Performed by: ADVANCED PRACTICE MIDWIFE

## 2023-05-09 PROCEDURE — 99207 PR FIRST OB VISIT: CPT | Performed by: ADVANCED PRACTICE MIDWIFE

## 2023-05-09 PROCEDURE — 86901 BLOOD TYPING SEROLOGIC RH(D): CPT | Performed by: ADVANCED PRACTICE MIDWIFE

## 2023-05-09 PROCEDURE — 81003 URINALYSIS AUTO W/O SCOPE: CPT | Performed by: ADVANCED PRACTICE MIDWIFE

## 2023-05-09 PROCEDURE — 86762 RUBELLA ANTIBODY: CPT | Performed by: ADVANCED PRACTICE MIDWIFE

## 2023-05-09 PROCEDURE — 86870 RBC ANTIBODY IDENTIFICATION: CPT | Performed by: ADVANCED PRACTICE MIDWIFE

## 2023-05-09 RX ORDER — PRENATAL VIT/IRON FUM/FOLIC AC 27MG-0.8MG
1 TABLET ORAL DAILY
Qty: 90 TABLET | Refills: 3 | Status: SHIPPED | OUTPATIENT
Start: 2023-05-09 | End: 2024-03-21

## 2023-05-09 NOTE — PROGRESS NOTES
10w1d   Kelly Wakefield is a 38 year old  who presents to the clinic with her partner, Judson, for an new ob visit. She is not a previous CNM patient. Kelly had an early U/S showing HOLLIE and had some bleeding at that time. Repeat U/S today for viability. She is tired and nauseated, but no vomiting. She likes to hike/camp, and hopes to keep that activity up this summer.  Estimated Date of Delivery: Dec 4, 2023 is calculated from Patient's last menstrual period was 2023.     She has had bleeding since her LMP.  Had bleeding/spotting around 7-8w, nothing since. Known HOLLIE seen on 7w U/S.   She has had mild nausea. Weigh loss has not occurred.   This was a planned pregnancy.   FOZACARIAS is involved,  Judson   OTHER CONCERNS: none    INFECTION HISTORY  HIV: no  Hepatitis B: no  Hepatitis C: no  Syphilis:  no  Tuberculosis: no   PPD- no  Herpes self: no  Herpes partner:  no  Chlamydia:  no  Gonorrhea:  no  HPV: yes  BV:  no  Trichomonis:  no  Chicken Pox:  YES-as a child  ====================================================  GENETIC SCREENING  Genetic screening reviewed. High Risk? none  ====================================================  PERSONAL/SOCIAL HISTORY  Lives lives with their spouse.  Employment: Full time.  Her job involves light activity .  HX OF ABUSE: no  =====================================================   REVIEW OF SYSTEMS  CONSTITUTIONAL: NEGATIVE for fever, chills  EYES: POSITIVE for blurred vision   RESP: NEGATIVE for significant cough or SOB  CV: NEGATIVE for chest pain, palpitations   GI: POSITIVE for constipation, nausea and poor appetite and NEGATIVE for vomiting  : NEGATIVE for frequency, dysuria, or hematuria  MUSCULOSKELETAL: NEGATIVE for significant arthralgias or myalgia  NEURO: NEGATIVE for weakness, dizziness or paresthesias or headache  HEME: NEGATIVE for bleeding problems  PSYCHIATRIC: NEGATIVE for changes in mood or  "affect  ====================================================    PHYSICAL EXAM:  /75 (BP Location: Left arm, Patient Position: Sitting, Cuff Size: Adult Regular)   Pulse 74   Ht 1.66 m (5' 5.35\")   Wt 75.8 kg (167 lb 1.6 oz)   LMP 2023   BMI 27.51 kg/m    BMI- Body mass index is 27.51 kg/m .,     RECOMMENDED WEIGHT GAIN: 15-25 lbs.  PHQ9- Today's Depression Rating was No Value exists for the : HP#PHQ9  GENERAL:  Pleasant pregnant female, alert, well groomed.   SKIN:  Warm and dry, without lesions or rashes  HEAD: Symmetrical features.  NECK:  Thyroid without enlargement and nodules.  Lymph nodes not palpable.   LUNGS:  Clear to auscultation.  HEART:  RRR without murmur.  ABDOMEN: Soft without masses , tenderness or organomegaly.  No CVA tenderness. No scars noted.   's  MUSCULOSKELETAL:  Full range of motion  EXTREMITIES:  No edema. No significant varicosities.   =========================================  ASSESSMENT:  10w1d,   (O09.521) AMA (advanced maternal age) multigravida 35+, first trimester  (primary encounter diagnosis)  Plan: Prenatal Vit-Fe Fumarate-FA (PRENATAL         MULTIVITAMIN W/IRON) 27-0.8 MG tablet    (Z3A.10) 10 weeks gestation of pregnancy    PREGNANCY AT RISK? Yes-AMA  ==========================================  PLAN:  Instructed on use of triage nurse line and contacting the on call CNM after hours for an urgent need such as fever, vagina bleeding, bladder or vaginal infection, rupture of membranes,  or term labor.    Discussed the indications, uses for and false positives for quad screen, nuchal translucency and fetal survey ultrasound at 18-20 weeks gestation. Has appt with MFM set for 23.  Instructed on best evidence for: weight gain for her BMI for pregnancy; healthy diet and foods to avoid; exercise and activity during pregnancy;avoiding exposure to toxoplasmosis; and maintenance of a generally healthy lifestyle.   Discussed the harms, " benefits, side effects and alternative therapies for current prescribed and OTC medications.  Discussed that DEET is safe in pregnancy, encouraged to avoid mosquito/tick bites as much as possible.  Frequent walking encouraged. Hiking/campin is great    Jerry Cho CNM

## 2023-05-10 LAB
RUBV IGG SERPL QL IA: 13.4 INDEX
RUBV IGG SERPL QL IA: POSITIVE

## 2023-05-11 LAB — BACTERIA UR CULT: NO GROWTH

## 2023-05-18 ENCOUNTER — PRE VISIT (OUTPATIENT)
Dept: MATERNAL FETAL MEDICINE | Facility: CLINIC | Age: 38
End: 2023-05-18
Payer: COMMERCIAL

## 2023-05-22 NOTE — PROGRESS NOTES
"[facility]  Genetic Counseling Consult    Patient:  Kelly Wakefield YOB: 1985   Date of Service:  23   MRN: [id number]    Kelly was seen at the [facility] for genetic consultation. The indication for genetic counseling is advanced maternal age. The patient was accompanied to this visit by her spouse, Judson.    IMPRESSION/ PLAN   During today's MFM visit, Kelly had a blood draw for NIPS through [facility]. The NIPS screens for trisomy 21, 18, and 13 and the patient opted to screen for sex chromosome aneuploidies, including reported fetal sex. Results are expected in 7-10 days. The patient will be called with results and if they do not answer they requested a detailed message with results on their voicemail, including the predicted fetal sex information.  Patient was informed that results, including fetal sex, will be available in [employer].    Kelly had a nuchal translucency ultrasound today. Please see the ultrasound report for further details.     Kelly and her spouse, Judson Lock (MRN: [id number]) also elected to have their blood drawn for expanded carrier screening. Results are expected in 14-21 days.      Further recommendations include a level II ultrasound with MFM and maternal serum AFP only screening for neural tube defects, if desired (quad screen should NOT be performed). The level II ultrasound has been scheduled for 2023.     PREGNANCY HISTORY   /Parity:       Kelly's pregnancy history is significant for:     2021, 7 weeks, D&C    CURRENT PREGNANCY   Current Age: 38 year old   Age at Delivery: 38 year old  SANDRA: 2023, by Last Menstrual Period                                   Gestational Age: 12w0d  This pregnancy is a single gestation.          FAMILY HISTORY   A three-generation pedigree was obtained today and is scanned under the \"Media\" tab in Epic. The family history " was reported by Kelly and their partner.    The following significant findings were reported today:     Kelly's mother was diagnosed with breast cancer at age 57. Kelly shared that her mother did have genetic testing to assess the risk for a hereditary cancer syndrome and genetic testing was negative.    Kelly's spouse, Judson has a nephew that was born with unilateral cataract.    Judson's paternal female first cousin had a baby that  short after birth because her lungs did not develop prenatally. This same cousin also had a history of multiple pregnancy losses but did have two healthy children.    Otherwise, the reported family history is unremarkable for intellectual disabilities, known genetic conditions, and consanguinity.       CARRIER SCREENING   Expanded carrier screening is available to screen for autosomal recessive conditions and X-linked conditions in a large list of genes. Autosomal recessive conditions happen when a mutation has been inherited from the egg and sperm and include conditions like cystic fibrosis, thalassemia, hearing loss, spinal muscular atrophy, and more. X-linked conditions happen when a mutation has been inherited from the egg and include conditions like fragile X syndrome.  screening was also reviewed. About [place] Screening    The patient elected to pursue carrier screening today. We discussed the following:     Carrier screening does not test the pregnancy but gives a risk assessment for the pregnancy and future pregnancies to have the condition    There are different size panels or list of conditions for carrier screening. The patient elected for [employer]'s comprehensive panel of 289 genes including fragile X syndrome    Some conditions cause health problems for carriers    Carrier screening does not test for all genetic and health conditions or risk factors    There are limitations to current technology and results may be updated at a later date    The results typically  take 2-3 weeks. They will be available in EPIC and routed to the referring OB provider. The patient can view them in [employer] and the lab's patient portal.    The patient's partner , Judson Lock (MRN: [id number]), also had carrier screening today.    If an individual is a carrier, family members could be as well. The patient is encouraged to share this information with relatives.    The lab will communicate the out-of-pocket cost with the patient and will also provide an option to switch to self-pay. The default is billing through insurance, and it is the patient's responsibility to respond to the communication if they would like to switch to self-pay.       RISK ASSESSMENT FOR CHROMOSOME CONDITIONS   We explained that the risk for fetal chromosome abnormalities increases with maternal age. We discussed specific features of common chromosome abnormalities, including Down syndrome, trisomy 13, trisomy 18, and sex chromosome trisomies.      At age 38 at midtrimester, the risk to have a baby with Down syndrome is 1 in 168.     At age 38 at midtrimester, the risk to have a baby with any chromosome abnormality is 1 in 82.     Kelly has not had genetic screening in this pregnancy but elected to have screening today.      GENETIC TESTING OPTIONS   Genetic testing during a pregnancy includes screening and diagnostic procedures.      Screening tests are non-invasive which means no risk to the pregnancy and includes ultrasounds and blood work. The benefits and limitations of screening were reviewed. Screening tests provide a risk assessment (chance) specific to the pregnancy for certain fetal chromosome abnormalities but cannot definitively diagnose or exclude a fetal chromosome abnormality. Follow-up genetic counseling and consideration of diagnostic testing is recommended with any abnormal screening result. Diagnostic testing during a pregnancy is more certain and can test for more conditions. However, the tests do have a  risk of miscarriage that requires careful consideration. These tests can detect fetal chromosome abnormalities with greater than 99% certainty. Results can be compromised by maternal cell contamination or mosaicism and are limited by the resolution of current genetic testing technology.     There is no screening or diagnostic test that detects all forms of birth defects or intellectual disability.     We discussed the following screening options:   First trimester screening    Ultrasound between [id number] that includes nuchal translucency measurement and nasal bone assessments    Nuchal translucency refers to the space at the back of the neck where fluid builds up. All babies at this stage have fluid and there is only concern if there is too much fluid    Nasal bone refers to the small bone in the nose. There is concern for conditions like Down syndrome if the bone cannot be seen at all    Blood work from arm for levels of hCG and BASSEM-A    Screens for trisomy 21 and trisomy 18    Cannot screen for open neural tube defects, maternal serum AFP after 15 weeks is recommended    Non-invasive prenatal testing (NIPT)    Also called cell-free DNA screening because it detects chromosomes from the placenta in the pregnant person's blood    Can be done any time after 10 weeks gestation    Screens for trisomy 21, trisomy 18, trisomy 13, and sex chromosome aneuploidies    Cannot screen for open neural tube defects, maternal serum AFP after 15 weeks is recommended      We discussed the following ultrasound options:  Nuchal translucency (NT) ultrasound    Ultrasound between [id number] that includes nuchal translucency measurement and nasal bone assessments    Nuchal translucency refers to the space at the back of the neck where fluid builds up. All babies at this stage have fluid and there is only concern if there is too much fluid    Nasal bone refers to the small bone in the nose. There is concern for conditions like Down  syndrome if the bone cannot be seen at all    This ultrasound can be done as part of first trimester screening, at the same time as another screen (NIPT), at the same time as a CVS, or if the patients does not want genetic screening.    Markers on ultrasound detects about 70% of pregnancies with aneuploidy    Abnormalities on NT ultrasound can also increase the risk for a birth defect, like a heart defect    Comprehensive level II ultrasound (Fetal Anatomy Ultrasound)    Ultrasound done between 18-20 weeks gestation    Screens for major birth defects and markers for aneuploidy (like trisomy 21 and trisomy 18)    Includes looking at the fetus/baby's growth, heart, organs (stomach, kidneys), placenta, and amniotic fluid      We discussed the following diagnostic options:   Chorionic villus sampling (CVS)    Invasive diagnostic procedure done between 10w0d and 13w6d    The procedure collects a small sample from the placenta for the purpose of chromosomal testing and/or other genetic testing    Diagnostic result; more than 99% sensitivity for fetal chromosome abnormalities    Cannot screen for open neural tube defects, maternal serum AFP after 15 weeks is recommended    Amniocentesis    Invasive diagnostic procedure done after 15 weeks gestation    The procedure collects a small sample of amniotic fluid for the purpose of chromosomal testing and/or other genetic testing    Diagnostic result; more than 99% sensitivity for fetal chromosome abnormalities    Testing for AFP in the amniotic fluid can test for open neural tube defects        It was a pleasure to be involved with Kelly dukes. Face-to-face time of the meeting was 40 minutes.    Maria Ines Martinez, YOU, MS, C  Certified and [place] Licensed Genetic Counselor  [facility]  Maternal Fetal Medicine  Office: [phone]  [facility]: [phone]   Fax: [phone]  [facility]

## 2023-05-23 ENCOUNTER — OFFICE VISIT (OUTPATIENT)
Dept: MATERNAL FETAL MEDICINE | Facility: CLINIC | Age: 38
End: 2023-05-23
Attending: ADVANCED PRACTICE MIDWIFE
Payer: COMMERCIAL

## 2023-05-23 ENCOUNTER — HOSPITAL ENCOUNTER (OUTPATIENT)
Dept: ULTRASOUND IMAGING | Facility: CLINIC | Age: 38
Discharge: HOME | End: 2023-05-23
Attending: ADVANCED PRACTICE MIDWIFE
Payer: COMMERCIAL

## 2023-05-23 ENCOUNTER — LAB (OUTPATIENT)
Dept: LAB | Facility: CLINIC | Age: 38
End: 2023-05-23
Attending: ADVANCED PRACTICE MIDWIFE
Payer: COMMERCIAL

## 2023-05-23 ENCOUNTER — MEDICAL CORRESPONDENCE (OUTPATIENT)
Dept: MATERNAL FETAL MEDICINE | Facility: CLINIC | Age: 38
End: 2023-05-23

## 2023-05-23 DIAGNOSIS — O09.512 SUPERVISION OF ELDERLY PRIMIGRAVIDA IN SECOND TRIMESTER: Primary | ICD-10-CM

## 2023-05-23 DIAGNOSIS — Z31.430 ENCOUNTER OF FEMALE FOR TESTING FOR GENETIC DISEASE CARRIER STATUS FOR PROCREATIVE MANAGEMENT: ICD-10-CM

## 2023-05-23 DIAGNOSIS — O09.512 SUPERVISION OF ELDERLY PRIMIGRAVIDA IN SECOND TRIMESTER: ICD-10-CM

## 2023-05-23 DIAGNOSIS — O09.521 MULTIGRAVIDA OF ADVANCED MATERNAL AGE IN FIRST TRIMESTER: Primary | ICD-10-CM

## 2023-05-23 DIAGNOSIS — O26.90 PREGNANCY RELATED CONDITION, ANTEPARTUM: ICD-10-CM

## 2023-05-23 DIAGNOSIS — Z36.82 NUCHAL TRANSLUCENCY OF FETUS ON PRENATAL ULTRASOUND: ICD-10-CM

## 2023-05-23 PROCEDURE — 76813 OB US NUCHAL MEAS 1 GEST: CPT

## 2023-05-23 PROCEDURE — 76813 OB US NUCHAL MEAS 1 GEST: CPT | Mod: 26 | Performed by: STUDENT IN AN ORGANIZED HEALTH CARE EDUCATION/TRAINING PROGRAM

## 2023-05-23 PROCEDURE — 36415 COLL VENOUS BLD VENIPUNCTURE: CPT

## 2023-05-23 PROCEDURE — 96040 HC GENETIC COUNSELING, EACH 30 MINUTES: CPT | Performed by: GENETIC COUNSELOR, MS

## 2023-05-23 NOTE — PROGRESS NOTES
Please see the full imaging report from the ViewPoint program under the imaging tab.    Kathi Lees MD  Maternal Fetal Medicine

## 2023-05-30 ENCOUNTER — TELEPHONE (OUTPATIENT)
Dept: MATERNAL FETAL MEDICINE | Facility: CLINIC | Age: 38
End: 2023-05-30
Payer: COMMERCIAL

## 2023-05-30 LAB — SCANNED LAB RESULT: NORMAL

## 2023-05-30 NOTE — TELEPHONE ENCOUNTER
Called and discussed normal NIPT results with Kelly. Results indicate a low risk for fetal aneuploidy involving chromosomes 21, 18, 13, or sex chromosomes (XY). Fetal sex (male) was disclosed per patient wishes. This puts her current pregnancy at low risk for Down syndrome, trisomy 18, trisomy 13 and sex chromosome abnormalities. Although these results are reassuring, this does not replace a standard chromosome analysis from a chorionic villus sampling or amniocentesis.     Plan: Level II ultrasound is scheduled on 7/7/2023. MSAFP is the appropriate second trimester screening test for open neural tube defects; the maternal quad screen is not recommended. Her results are available in her Epic chart for her primary OB to review.    Maria Ines Martinez MS, [facility]  Maternal Fetal Medicine  [phone]

## 2023-06-03 ENCOUNTER — HEALTH MAINTENANCE LETTER (OUTPATIENT)
Age: 38
End: 2023-06-03

## 2023-06-06 ENCOUNTER — PRENATAL OFFICE VISIT (OUTPATIENT)
Dept: MIDWIFE SERVICES | Facility: CLINIC | Age: 38
End: 2023-06-06
Payer: COMMERCIAL

## 2023-06-06 VITALS
SYSTOLIC BLOOD PRESSURE: 100 MMHG | HEART RATE: 75 BPM | WEIGHT: 171.3 LBS | DIASTOLIC BLOOD PRESSURE: 59 MMHG | TEMPERATURE: 97.4 F | BODY MASS INDEX: 28.2 KG/M2

## 2023-06-06 DIAGNOSIS — Z34.02 ENCOUNTER FOR SUPERVISION OF NORMAL FIRST PREGNANCY, SECOND TRIMESTER: Primary | ICD-10-CM

## 2023-06-06 PROCEDURE — 99207 PR PRENATAL VISIT: CPT | Performed by: ADVANCED PRACTICE MIDWIFE

## 2023-06-06 ASSESSMENT — ANXIETY QUESTIONNAIRES
7. FEELING AFRAID AS IF SOMETHING AWFUL MIGHT HAPPEN: NOT AT ALL
5. BEING SO RESTLESS THAT IT IS HARD TO SIT STILL: NOT AT ALL
GAD7 TOTAL SCORE: 0
2. NOT BEING ABLE TO STOP OR CONTROL WORRYING: NOT AT ALL
GAD7 TOTAL SCORE: 0
3. WORRYING TOO MUCH ABOUT DIFFERENT THINGS: NOT AT ALL
1. FEELING NERVOUS, ANXIOUS, OR ON EDGE: NOT AT ALL
6. BECOMING EASILY ANNOYED OR IRRITABLE: NOT AT ALL
IF YOU CHECKED OFF ANY PROBLEMS ON THIS QUESTIONNAIRE, HOW DIFFICULT HAVE THESE PROBLEMS MADE IT FOR YOU TO DO YOUR WORK, TAKE CARE OF THINGS AT HOME, OR GET ALONG WITH OTHER PEOPLE: NOT DIFFICULT AT ALL

## 2023-06-06 ASSESSMENT — PATIENT HEALTH QUESTIONNAIRE - PHQ9
SUM OF ALL RESPONSES TO PHQ QUESTIONS 1-9: 2
5. POOR APPETITE OR OVEREATING: NOT AT ALL

## 2023-06-06 NOTE — PROGRESS NOTES
14w1d  Feeling well except for constipation. Takes stool softeners but does not like the effect of having to emergently go to the bathroom. Discussed other strategies including eating high fiber, bulky foods, increasing hydration, flax seeds and magnesium supplementation. No other concerns. Has L2 ultrasound scheduled a MFM. Will RTC in 4 wks for AFP and prenatal visit. EVERARDO

## 2023-06-15 LAB — SCANNED LAB RESULT: ABNORMAL

## 2023-06-16 ENCOUNTER — TELEPHONE (OUTPATIENT)
Dept: MATERNAL FETAL MEDICINE | Facility: CLINIC | Age: 38
End: 2023-06-16
Payer: COMMERCIAL

## 2023-06-16 NOTE — TELEPHONE ENCOUNTER
June 16, 2023    I called Kelly to discuss the results of her and her partner, Judson's, carrier screening.     The couple did not screen mutually positive for any of the 558 conditions assessed. Kelly did not screen positive for any of the x-linked conditions. This significantly reduces the couple's reproductive risk for this group of conditions.    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     The most severe CFTR condition is cystic fibrosis (CF). CF is typically a childhood-onset condition in which abnormally thick mucus production can cause respiratory infections, lung disease, gastrointestinal problems, and pancreatic insufficiency.     Individuals who carry a pathogenic CFTR condition may be at increased risk for chronic pancreatitis compared to the general population (risk is <1%). This risk is not observed with this specific variant.    This variant is a low-penetrance variant. When co-inherited with a classic pathogenic mutation, there is a risk for atypical CF and congenital absence of the vas deferens. There is not a risk for classic CF. When this variant is in cis with with a second variant, called R117H and is co-inherited with a second pathogenic variant in trans, there is a risk for a more classic CF presentation. The R117H mutation was NOT present.    Since Judson was NOT identified to be a carrier of this condition, the residual risk is significantly reduced.     DUOX-related conditions (c.2887C>T):    These conditions include autosomal recessive thyroid dyshormonogenesis and autosomal dominant partial iodide organification defect (PIOD). Individuals who harbor one variant in this gene may be at risk for the dominant form.     Individuals with thyroid dyshormonogenesis can have difficulty  feeding and tiredness. If left untreated, hypothyroidism may lead to intellectual disability, short stature, and an enlarged thyroid gland.     Individuals with PIOD may have hypothyroidism that is transient that can resolve spontaneously.     Since Judson was NOT identified to be a carrier of this condition, the couple's residual reproductive risk 1 in 2536.     Since Kelly has had normal thyroid function, including in pregnancy, she is not expected to have symptoms of the dominant condition. However, she may want to ensure that her children have appropriate thyroid screening.     Primary Ciliary Dyskinesia (DNAH11: c.56211S>T):    This condition is an example of a ciliopathy condition, which impacts the structure or function of the cilia of a cell. Cilia are involved with cell movement and signaling.     Symptoms of PCD can impact many different body systems, including breathing problems at birth, chronic ear infections, internal organs not typically arranged in the expected way (heterotaxy), or even situs inversus totalis, which is a complete mirror-image reversal of the internal organs.     Infertility is common amongst individuals with PCD due to motility differences in the sperm or structural differences in the fallopian tubes.    Since Judson was NOT identified to be a carrier of this condition, the couple's residual reproductive risk is 1 in 84,000.    Severe congenital neutropenia due to HAX1 deficiency (c.125dup):    This condition impacts the immune system leading to a deficiency of certain white blood cells.     Symptoms typically present in infancy and include increased susceptibility to infection, chronic inflammation of the gums and skin, and frequent fevers. Decreased bone density and increased bone brittleness can occur. Additionally, some affected individuals may have developmental delay and seizures.     Individuals with this condition also have an increased chance for rare blood cancers.     Since  Judson was NOT identified to be a carrier of this condition, the couple's residual reproductive risk is 1 in 199,600.      Judson screened positive for one condition:    PCC38T-xjkhoum conditions (WNT10A: c.682T>A):    These conditions  include autosomal recessive odonto-onycho-dermal dysplasia (OODD) and Fpaöyr-Fsskty-Mpqpxpoi syndrome (SSPS) and autosomal dominant isolated tooth agenesis. People who are carriers of the autosomal recessive conditions may be at risk to develop the autosomal dominant condition.     OODD and SSPS refer to a spectrum of features associated with ectodermal dysplasia (ED), which causes abnormal development of the skin, hair, nails, teeth, and sweat glands. Isolated tooth agenesis results in the absence of one or more teeth, typically secondary teeth. Some individuals with tooth agenesis can have mild symptoms of ED.    This particular variant is a low penetrance variant, meaning that not all individuals with this variant will have associated symptoms.     Since Kelly was NOT identified to be a carrier of this condition, the residual reproductive risk is 1 in 121,600.    Judson denies any history of tooth or skin concerns.       Any of the couple's children will have a 50% chance of being carriers of these conditions. They could have symptoms of DUOX- and YLM01I-mtrpcmp conditions if the familial variants are inherited. This is also true for the couple's siblings. Other family members could also be at risk to be carriers and the couple was encouraged to share this information with their families.     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 Pseudodeficiency alleles are NOT associated with carrier status or disease, but can exhibit false positive results on biochemical tests such as  screening.      Larisa Maciel MS, [facility]  Licensed Genetic Counselor  [facility]  Maternal Fetal Medicine  [email or web address]  [phone]

## 2023-07-07 ENCOUNTER — PRENATAL OFFICE VISIT (OUTPATIENT)
Dept: MIDWIFE SERVICES | Facility: CLINIC | Age: 38
End: 2023-07-07
Payer: COMMERCIAL

## 2023-07-07 ENCOUNTER — OFFICE VISIT (OUTPATIENT)
Dept: MATERNAL FETAL MEDICINE | Facility: CLINIC | Age: 38
End: 2023-07-07
Attending: STUDENT IN AN ORGANIZED HEALTH CARE EDUCATION/TRAINING PROGRAM
Payer: COMMERCIAL

## 2023-07-07 ENCOUNTER — HOSPITAL ENCOUNTER (OUTPATIENT)
Dept: ULTRASOUND IMAGING | Facility: CLINIC | Age: 38
Discharge: HOME | End: 2023-07-07
Attending: STUDENT IN AN ORGANIZED HEALTH CARE EDUCATION/TRAINING PROGRAM
Payer: COMMERCIAL

## 2023-07-07 VITALS
SYSTOLIC BLOOD PRESSURE: 116 MMHG | DIASTOLIC BLOOD PRESSURE: 71 MMHG | BODY MASS INDEX: 29.63 KG/M2 | WEIGHT: 180 LBS | OXYGEN SATURATION: 98 % | HEART RATE: 87 BPM

## 2023-07-07 DIAGNOSIS — O09.521 MULTIGRAVIDA OF ADVANCED MATERNAL AGE IN FIRST TRIMESTER: ICD-10-CM

## 2023-07-07 DIAGNOSIS — O09.522 MULTIGRAVIDA OF ADVANCED MATERNAL AGE IN SECOND TRIMESTER: Primary | ICD-10-CM

## 2023-07-07 DIAGNOSIS — Z36.1 ENCOUNTER FOR ANTENATAL SCREENING FOR HIGH ALPHA-FETOPROTEIN LEVEL: ICD-10-CM

## 2023-07-07 DIAGNOSIS — O09.512 AMA (ADVANCED MATERNAL AGE) PRIMIGRAVIDA 35+, SECOND TRIMESTER: Primary | ICD-10-CM

## 2023-07-07 PROCEDURE — 76811 OB US DETAILED SNGL FETUS: CPT

## 2023-07-07 PROCEDURE — 82105 ALPHA-FETOPROTEIN SERUM: CPT | Mod: 90 | Performed by: ADVANCED PRACTICE MIDWIFE

## 2023-07-07 PROCEDURE — 99207 PR PRENATAL VISIT: CPT | Performed by: ADVANCED PRACTICE MIDWIFE

## 2023-07-07 PROCEDURE — 76811 OB US DETAILED SNGL FETUS: CPT | Mod: 26 | Performed by: OBSTETRICS & GYNECOLOGY

## 2023-07-07 PROCEDURE — 36415 COLL VENOUS BLD VENIPUNCTURE: CPT | Performed by: ADVANCED PRACTICE MIDWIFE

## 2023-07-07 PROCEDURE — 99000 SPECIMEN HANDLING OFFICE-LAB: CPT | Performed by: ADVANCED PRACTICE MIDWIFE

## 2023-07-07 NOTE — PROGRESS NOTES
"18w4d  Kelly is here with Judson after [facility] fetal survey. Feeling well. She has had some \"poking\" pain in her mid abdomen, so was relieved to see that baby is doing well on U/S. She has struggled with constipation, has been trying psyllium husk and stool softener. Discussed that her bowels are likely the cause of her abdominal pain at this point, and to continue to take stool softeners and increase water intake. AFP drawn today. Discussed GCT/hgb at 24w. Return to care 5 weeks.  Jerry Cho CNM    "

## 2023-07-07 NOTE — PROGRESS NOTES
Please refer to ultrasound report under 'Imaging' Studies of 'Chart Review' tabs.    Cj Cota M.D.

## 2023-07-09 LAB
# FETUSES US: NORMAL
AFP MOM SERPL: 1.43
AFP SERPL-MCNC: 61 NG/ML
AGE - REPORTED: 37.7 YR
CURRENT SMOKER: NO
FAMILY MEMBER DISEASES HX: NO
GA METHOD: NORMAL
GA: NORMAL WK
IDDM PATIENT QL: NO
INTEGRATED SCN PATIENT-IMP: NORMAL
SPECIMEN DRAWN SERPL: NORMAL

## 2023-07-20 ENCOUNTER — MYC MEDICAL ADVICE (OUTPATIENT)
Dept: MIDWIFE SERVICES | Facility: CLINIC | Age: 38
End: 2023-07-20
Payer: COMMERCIAL

## 2023-07-20 ENCOUNTER — OFFICE VISIT (OUTPATIENT)
Dept: URGENT CARE | Facility: URGENT CARE | Age: 38
End: 2023-07-20
Payer: COMMERCIAL

## 2023-07-20 VITALS
BODY MASS INDEX: 30.45 KG/M2 | DIASTOLIC BLOOD PRESSURE: 74 MMHG | OXYGEN SATURATION: 99 % | HEART RATE: 75 BPM | TEMPERATURE: 97.7 F | WEIGHT: 185 LBS | SYSTOLIC BLOOD PRESSURE: 124 MMHG

## 2023-07-20 DIAGNOSIS — L03.211 FACIAL CELLULITIS: Primary | ICD-10-CM

## 2023-07-20 PROCEDURE — 99203 OFFICE O/P NEW LOW 30 MIN: CPT | Performed by: FAMILY MEDICINE

## 2023-07-20 RX ORDER — CEPHALEXIN 500 MG/1
1000 CAPSULE ORAL 2 TIMES DAILY
Qty: 40 CAPSULE | Refills: 0 | Status: SHIPPED | OUTPATIENT
Start: 2023-07-20 | End: 2023-07-30

## 2023-07-20 NOTE — PROGRESS NOTES
Subjective: Last weekend patient developed a boil in her left upper lateral face, near a mole.  On Monday, 3 days ago, she popped it and got a lot of pus out.  Yesterday she started getting swelling around the eye.  It feels like it spreading into her cheek as well now and they are warmer.  No fevers.  She is 20 weeks pregnant.    Objective: There is a slightly tender boil left forehead with no fluctuant center and there is mild swelling around the left eye and just into the upper cheek.    Assessment and plan: Cellulitis spreading from a boil.  I do not see anything to culture at this time.  We will treat with Keflex, get both doses in today, should see improvement in 24 to 48 hours.

## 2023-08-09 NOTE — PATIENT INSTRUCTIONS
You have been provided the My Labor and Birth Wishes document.  Please review at home and bring to your next prenatal visit. Bring this sheet to the hospital for your birth. Give copies to your care team members and support person.   Additional copies can be found here:  [email or web address]

## 2023-08-15 ENCOUNTER — PRENATAL OFFICE VISIT (OUTPATIENT)
Dept: MIDWIFE SERVICES | Facility: CLINIC | Age: 38
End: 2023-08-15
Payer: COMMERCIAL

## 2023-08-15 VITALS — SYSTOLIC BLOOD PRESSURE: 117 MMHG | WEIGHT: 190 LBS | BODY MASS INDEX: 31.28 KG/M2 | DIASTOLIC BLOOD PRESSURE: 66 MMHG

## 2023-08-15 DIAGNOSIS — O09.522 MULTIGRAVIDA OF ADVANCED MATERNAL AGE IN SECOND TRIMESTER: Primary | ICD-10-CM

## 2023-08-15 DIAGNOSIS — O26.899 RH NEGATIVE, ANTEPARTUM: ICD-10-CM

## 2023-08-15 DIAGNOSIS — Z67.91 RH NEGATIVE, ANTEPARTUM: ICD-10-CM

## 2023-08-15 LAB
ANTIBODY SCREEN: NEGATIVE
ERYTHROCYTE [DISTWIDTH] IN BLOOD BY AUTOMATED COUNT: 12.6 % (ref 10–15)
FERRITIN SERPL-MCNC: 25 NG/ML (ref 6–175)
GLUCOSE 1H P 50 G GLC PO SERPL-MCNC: 92 MG/DL (ref 70–129)
HCT VFR BLD AUTO: 32.3 % (ref 35–47)
HGB BLD-MCNC: 10.9 G/DL (ref 11.7–15.7)
HGB BLD-MCNC: 10.9 G/DL (ref 11.7–15.7)
HOLD SPECIMEN: NORMAL
IRON BINDING CAPACITY (ROCHE): 381 UG/DL (ref 240–430)
IRON SATN MFR SERPL: 30 % (ref 15–46)
IRON SERPL-MCNC: 113 UG/DL (ref 37–145)
MCH RBC QN AUTO: 32.3 PG (ref 26.5–33)
MCHC RBC AUTO-ENTMCNC: 33.7 G/DL (ref 31.5–36.5)
MCV RBC AUTO: 96 FL (ref 78–100)
PLATELET # BLD AUTO: 253 10E3/UL (ref 150–450)
RBC # BLD AUTO: 3.37 10E6/UL (ref 3.8–5.2)
SPECIMEN EXPIRATION DATE: NORMAL
WBC # BLD AUTO: 9 10E3/UL (ref 4–11)

## 2023-08-15 PROCEDURE — 82950 GLUCOSE TEST: CPT | Performed by: ADVANCED PRACTICE MIDWIFE

## 2023-08-15 PROCEDURE — 83550 IRON BINDING TEST: CPT | Performed by: ADVANCED PRACTICE MIDWIFE

## 2023-08-15 PROCEDURE — 36415 COLL VENOUS BLD VENIPUNCTURE: CPT | Performed by: ADVANCED PRACTICE MIDWIFE

## 2023-08-15 PROCEDURE — 83540 ASSAY OF IRON: CPT | Performed by: ADVANCED PRACTICE MIDWIFE

## 2023-08-15 PROCEDURE — 85027 COMPLETE CBC AUTOMATED: CPT | Performed by: ADVANCED PRACTICE MIDWIFE

## 2023-08-15 PROCEDURE — 82728 ASSAY OF FERRITIN: CPT | Performed by: ADVANCED PRACTICE MIDWIFE

## 2023-08-15 PROCEDURE — 86850 RBC ANTIBODY SCREEN: CPT | Performed by: ADVANCED PRACTICE MIDWIFE

## 2023-08-15 PROCEDURE — 99207 PR PRENATAL VISIT: CPT | Performed by: ADVANCED PRACTICE MIDWIFE

## 2023-08-15 RX ORDER — LANOLIN ALCOHOL/MO/W.PET/CERES
CREAM (GRAM) TOPICAL
COMMUNITY
Start: 2023-05-01 | End: 2023-12-08

## 2023-08-15 RX ORDER — OMEGA-3/DHA/EPA/FISH OIL 60 MG-90MG
CAPSULE ORAL
COMMUNITY
Start: 2023-05-01 | End: 2024-03-21

## 2023-08-15 ASSESSMENT — ANXIETY QUESTIONNAIRES
7. FEELING AFRAID AS IF SOMETHING AWFUL MIGHT HAPPEN: SEVERAL DAYS
3. WORRYING TOO MUCH ABOUT DIFFERENT THINGS: NOT AT ALL
GAD7 TOTAL SCORE: 1
1. FEELING NERVOUS, ANXIOUS, OR ON EDGE: NOT AT ALL
GAD7 TOTAL SCORE: 1
6. BECOMING EASILY ANNOYED OR IRRITABLE: NOT AT ALL
5. BEING SO RESTLESS THAT IT IS HARD TO SIT STILL: NOT AT ALL
2. NOT BEING ABLE TO STOP OR CONTROL WORRYING: NOT AT ALL

## 2023-08-15 ASSESSMENT — PATIENT HEALTH QUESTIONNAIRE - PHQ9
5. POOR APPETITE OR OVEREATING: NOT AT ALL
SUM OF ALL RESPONSES TO PHQ QUESTIONS 1-9: 1

## 2023-08-15 NOTE — PROGRESS NOTES
24w1d  Kelly feeling well. Nausea has subsided and she is feeling more like herself.  Feeling baby move more now. Denies LOF, bleeding, contractions, headache, or visual changes. GCT and Hbg today. Kelly curious about getting a covid booster before due date. Discussed that likely there will be a new booster in the fall along with the flu shot, more to come. RTC in 4 weeks. Will do Rhogam and Tdap at that visit.     Lilliana rose [facility]    Had Rhogam in 1st trimester so will have a positive antibody screen so not to be concerned when she sees this lab result.  Will give Rhogam as planned at 28 wks.      Attestment:   I was present with the [facility] student who participated in the service and in the documentation of the services provided. I have verified the history and personally performed the physical exam and medical decision making, as documented by the student and edited by me.   Molina ANTHONY, CNM

## 2023-09-15 ENCOUNTER — PRENATAL OFFICE VISIT (OUTPATIENT)
Dept: MIDWIFE SERVICES | Facility: CLINIC | Age: 38
End: 2023-09-15
Payer: COMMERCIAL

## 2023-09-15 VITALS
WEIGHT: 197.5 LBS | HEART RATE: 87 BPM | OXYGEN SATURATION: 99 % | SYSTOLIC BLOOD PRESSURE: 125 MMHG | BODY MASS INDEX: 32.51 KG/M2 | DIASTOLIC BLOOD PRESSURE: 75 MMHG

## 2023-09-15 DIAGNOSIS — O09.513 PRIMIGRAVIDA OF ADVANCED MATERNAL AGE IN THIRD TRIMESTER: Primary | ICD-10-CM

## 2023-09-15 DIAGNOSIS — O26.899 RH NEGATIVE, ANTEPARTUM: ICD-10-CM

## 2023-09-15 DIAGNOSIS — N81.89 PELVIC FLOOR WEAKNESS: ICD-10-CM

## 2023-09-15 DIAGNOSIS — Z67.91 RH NEGATIVE, ANTEPARTUM: ICD-10-CM

## 2023-09-15 PROCEDURE — 90471 IMMUNIZATION ADMIN: CPT | Performed by: ADVANCED PRACTICE MIDWIFE

## 2023-09-15 PROCEDURE — 99207 PR PRENATAL VISIT: CPT | Performed by: ADVANCED PRACTICE MIDWIFE

## 2023-09-15 PROCEDURE — 90715 TDAP VACCINE 7 YRS/> IM: CPT | Performed by: ADVANCED PRACTICE MIDWIFE

## 2023-09-15 PROCEDURE — 96372 THER/PROPH/DIAG INJ SC/IM: CPT | Performed by: ADVANCED PRACTICE MIDWIFE

## 2023-09-15 RX ORDER — POLYETHYLENE GLYCOL 3350 17 G/17G
1 POWDER, FOR SOLUTION ORAL DAILY PRN
COMMUNITY
End: 2024-03-21

## 2023-09-15 NOTE — PROGRESS NOTES
28w4d  Kelly is here today for routine for routine prenatal appt. Doing well. Reports active FM. She reports some urine leaking, concerned what that may mean for pelvic floor in postpartum. She reports frequency and leaking urine after recent void or with full bladder. She reports some numbness and tingling in hands and feet. Reports some feet swelling after standing for hours at a time. Discussed core breathing exercises, and PFPT referral, she is interested in referral today.Discussed carpel tunnel during pregnacy, may try wrist braces, she reports having some at home. Discussed compression socks with increased activity, and elevating feet with rest. Reviewed PTL and indications to contact CNMs.     Rhogam and Tdap today. Desiring Flu vaccine next visit. Follow-up 2 weeks.     ALINE Castro    I was present with the CNM student who participated in the service and in the documentation of the services provided. I have verified the history and personally performed the physical exam and medical decision making, as documented by the student and edited by me. RAJ Lara CNM

## 2023-09-15 NOTE — PATIENT INSTRUCTIONS
Weeks 26 to 30 of Your Pregnancy: Care Instructions  You're starting your last trimester. You'll probably feel your baby moving around more. Your back may ache as your body gets used to your baby's size and length. Take care of yourself, and pay attention to what your body needs.    Talk to your doctor about getting the Tdap shot. It will help protect your  against whooping cough (pertussis). Also ask your doctor about flu and COVID-19 shots if you haven't had them yet. If your blood type is Rh negative, you may be given a shot of Rh immune globulin (such as RhoGAM). It can help prevent problems for your baby.   You may have [place] contractions. They are single or several strong contractions without a pattern. These are practice contractions but not the start of labor.   Be kind to yourself.     Take breaks when you're tired.  Change positions often. Don't sit for too long or stand for too long.  At work, rest during breaks if you can. If you don't get breaks, talk to your doctor about writing a letter to your employer to request them.  Avoid fumes, chemicals, and tobacco smoke.  Be sexual if you want to.     You may be interested in sex, or you may not. Everyone is different.  Sex is okay unless your doctor tells you not to.  Your belly can make it hard to find good positions for sex. [place] and explore.  Watch for signs of  labor.    These signs include:   Menstrual-like cramps. Or you may have pain or pressure in your pelvis that happens in a pattern.  About 6 or more contractions in an hour (even after rest and a glass of water).  A low, dull backache that doesn't go away when you change positions.  An increase or change in vaginal discharge.  Light vaginal bleeding or spotting.  Your water breaking.  Know what to do if you think you are having contractions.     Drink 1 or 2 glasses of water.  Lie down on your left side for at least an hour.  While on your side, feel the top of your  "belly to see if it's tight.  Write down your contractions for an hour. Time how long it is from the start of one contraction to the start of the next.  Call your doctor if you have regular contractions.  Follow-up care is a key part of your treatment and safety. Be sure to make and go to all appointments, and call your doctor if you are having problems. It's also a good idea to know your test results and keep a list of the medicines you take.  Where can you learn more?  Go to https://www.[employer].net/patiented  Enter S999 in the search box to learn more about \"Weeks 26 to 30 of Your Pregnancy: Care Instructions.\"  Current as of: November 9, 2022               Content Version: 13.7    9711-0466 [employer].   Care instructions adapted under license by your healthcare professional. If you have questions about a medical condition or this instruction, always ask your healthcare professional. [employer] disclaims any warranty or liability for your use of this information.      Counting Your Baby's Kicks: Care Instructions  Overview     Counting your baby's kicks is one way your doctor can tell that your baby is healthy. You will probably feel your baby move for the first time between 16 and 22 weeks. The movement may feel like flutters rather than kicks. Your baby may move more at certain times of the day. When you are active, you may notice less kicking than when you are resting. At your prenatal visits, your doctor will ask whether the baby is active.  In your last trimester, your doctor may ask you to count the number of times you feel your baby move.  Follow-up care is a key part of your treatment and safety. Be sure to make and go to all appointments, and call your doctor if you are having problems. It's also a good idea to know your test results and keep a list of the medicines you take.  How do you count fetal kicks?  A common method of checking your baby's movement is to note the " "length of time it takes to count 10 movements (such as kicks, flutters, or rolls).  Pick your baby's most active time of day to count. This may be any time from morning to evening.  If you don't feel 10 movements in an hour, have something to eat or drink and count for another hour. If you don't feel at least 10 movements in the 2-hour period, call your doctor.  Do not use an at-home Doppler heart monitor in place of counting fetal movements.  When should you call for help?   Call your doctor now or seek immediate medical care if:    You feel fewer than 10 movements in a 2-hour period.     You noticed that your baby has stopped moving or is moving less than normal.   Watch closely for changes in your health, and be sure to contact your doctor if you have any problems.  Where can you learn more?  Go to https://www.[employer]/patiented  Enter U048 in the search box to learn more about \"Counting Your Baby's Kicks: Care Instructions.\"  Current as of: November 9, 2022               Content Version: 13.7    6193-6196 [employer].   Care instructions adapted under license by your healthcare professional. If you have questions about a medical condition or this instruction, always ask your healthcare professional. [employer] disclaims any warranty or liability for your use of this information.      Counting Your Baby's Movements   Counting your unborn baby's movements will assure you of your baby's health.   The best time to count is when your baby is most active. Do it at the same time every day.  To keep track of your baby's movements, use the attached chart. Bring the chart with you to each clinic visit.  Do not smoke for at least 2 hours before counting your baby's movements. (In fact, it's best to quit smoking if you're pregnant.)  Lie on your side. Put your hand on your baby.  Write the time you start counting movements.  Count the next 10 kicks, rolls, and other movements.  Write the " "time when your baby has finished 10 movements.  If you reach 10 movements within 2 hours, you're done for the day.  Call your care provider right away if:  You feel no movement for the first hour.  It takes more than 2 hours to feel 10 movements.  There's a change in the normal pattern of movements.  Your care provider's phone number is:   ________________________________________  The number to your [facility] birth center is:   ________________________________________     For informational purposes only. Not to replace the advice of your health care provider. Copyright   1991, 2005 [facility]. All rights reserved. Clinically reviewed by Penelope Parikh RN, [facility]. [employer] [id number] - REV 10/21.     Using Nitrous Oxide During Labor  What is nitrous oxide?  Nitrous oxide is a mixture of 50% nitrous oxide gas and 50% oxygen that is inhaled through a mask. Nitrous oxide is better known for use in dental offices or before surgery to help patients relax. Most people know of it as \"laughing gas.\"   How do I use it during labor?  You hold your own mask and begin to inhale the gas mixture about 30 seconds before a contraction begins. Breathing before and during a contraction helps the gas work the best right at the peak of the contraction, providing the greatest relief. It may take 3 to 4 contractions to get used to the rhythm of breathing the nitrous oxide.   Does nitrous oxide have any side effects?  Some women have reported dizziness, feeling sleepy, dry mouth and nausea (feeling sick to your stomach) with use of nitrous oxide. These side effects often decrease over time. Medicine is available to help ease nausea if it is a concern for you.   Is any extra monitoring required for me or my baby?  No. Your monitoring will not need to change just because you are using nitrous oxide.   Can I still be out of bed and use nitrous oxide?  Yes. Women sometimes feel dizzy when using nitrous oxide. " For this reason, you will begin using nitrous oxide while in a bed or chair. If you feel okay, you may get up and walk or use a birthing ball or stool. It will be important that you have someone in the room close by for support.   Can I use nitrous oxide and have IV pain medicines at the same time?  No. The combination of narcotics (injectable pain medications) and nitrous oxide can slow your breathing, so it is not safe for them to be used together. You may use nitrous oxide before receiving an epidural or IV pain medication.  If I use nitrous oxide can I still get an epidural?  Yes. You may wish to use nitrous oxide until you are ready for an epidural. Nitrous oxide can be less effective than an epidural in reducing labor pain. If an epidural is part of your birth plan, it is important that you get your epidural while you are still able to sit for safe placement.   Are there reasons I couldn't use nitrous oxide?   Yes. You cannot use nitrous oxide if you:  Cannot hold your own face mask.  Have received a dose of narcotic in the past few hours (your nurse will discuss this with you).  Have a documented B12 deficiency.  Have one of a very few other rare medical conditions.  Can my labor support person help me with my nitrous oxide?  No! Only you can administer nitrous oxide to yourself. Part of the built-in safety of nitrous oxide is that you hold your own mask. If anyone in the room is found to be handling the nitrous oxide equipment other than you or your nurse, the nitrous oxide will be removed.  Can I use nitrous oxide with other procedures?   Yes. If nitrous oxide is right for you and your provider agrees, it may be used in these situations:  During the repair of a laceration or episiotomy  Manual removal of your placenta  Blood draws  IV placement  For informational purposes only. Not to replace the advice of your health care provider. Copyright   2019 [employer]. All rights reserved. Clinically  reviewed by  [employer] APNL Team. [employer] 465478 - Rev .

## 2023-09-18 ASSESSMENT — ENCOUNTER SYMPTOMS
ARTHRALGIAS: 0
ABDOMINAL PAIN: 0
HEMATOCHEZIA: 0
PARESTHESIAS: 0
SORE THROAT: 0
EYE PAIN: 0
PALPITATIONS: 0
SHORTNESS OF BREATH: 0
DIZZINESS: 0
HEARTBURN: 1
CHILLS: 0
JOINT SWELLING: 0
HEADACHES: 0
HEMATURIA: 0
DYSURIA: 0
FEVER: 0
BREAST MASS: 0
NERVOUS/ANXIOUS: 0
COUGH: 0
DIARRHEA: 0
MYALGIAS: 0
NAUSEA: 0
CONSTIPATION: 0
FREQUENCY: 0
WEAKNESS: 0

## 2023-09-21 ENCOUNTER — OFFICE VISIT (OUTPATIENT)
Dept: FAMILY MEDICINE | Facility: CLINIC | Age: 38
End: 2023-09-21
Payer: COMMERCIAL

## 2023-09-21 VITALS
HEART RATE: 93 BPM | HEIGHT: 65 IN | DIASTOLIC BLOOD PRESSURE: 64 MMHG | TEMPERATURE: 98.2 F | OXYGEN SATURATION: 98 % | RESPIRATION RATE: 18 BRPM | WEIGHT: 199.6 LBS | BODY MASS INDEX: 33.26 KG/M2 | SYSTOLIC BLOOD PRESSURE: 102 MMHG

## 2023-09-21 DIAGNOSIS — F33.0 MILD RECURRENT MAJOR DEPRESSION (H): ICD-10-CM

## 2023-09-21 DIAGNOSIS — Z00.00 ROUTINE GENERAL MEDICAL EXAMINATION AT A HEALTH CARE FACILITY: Primary | ICD-10-CM

## 2023-09-21 PROCEDURE — 99213 OFFICE O/P EST LOW 20 MIN: CPT | Mod: 25 | Performed by: STUDENT IN AN ORGANIZED HEALTH CARE EDUCATION/TRAINING PROGRAM

## 2023-09-21 PROCEDURE — 99395 PREV VISIT EST AGE 18-39: CPT | Performed by: STUDENT IN AN ORGANIZED HEALTH CARE EDUCATION/TRAINING PROGRAM

## 2023-09-21 RX ORDER — SERTRALINE HYDROCHLORIDE 25 MG/1
25 TABLET, FILM COATED ORAL DAILY
Qty: 90 TABLET | Refills: 3 | Status: SHIPPED | OUTPATIENT
Start: 2023-09-21 | End: 2024-03-21

## 2023-09-21 ASSESSMENT — ENCOUNTER SYMPTOMS
DIZZINESS: 0
WEAKNESS: 0
CONSTIPATION: 0
EYE PAIN: 0
JOINT SWELLING: 0
MYALGIAS: 0
SORE THROAT: 0
FREQUENCY: 0
SHORTNESS OF BREATH: 0
ARTHRALGIAS: 0
PARESTHESIAS: 0
BREAST MASS: 0
PALPITATIONS: 0
HEMATURIA: 0
HEADACHES: 0
FEVER: 0
HEMATOCHEZIA: 0
DIARRHEA: 0
NERVOUS/ANXIOUS: 0
ABDOMINAL PAIN: 0
DYSURIA: 0
HEARTBURN: 1
NAUSEA: 0
CHILLS: 0
COUGH: 0

## 2023-09-21 NOTE — PROGRESS NOTES
SUBJECTIVE:   CC: Kelly is an 38 year old who presents for preventive health visit.       9/21/2023    10:29 AM   Additional Questions   Roomed by Nydia       Healthy Habits:     Getting at least 3 servings of Calcium per day:  Yes    Bi-annual eye exam:  Yes    Dental care twice a year:  Yes    Sleep apnea or symptoms of sleep apnea:  None    Diet:  Regular (no restrictions)    Frequency of exercise:  1 day/week    Duration of exercise:  15-30 minutes    Taking medications regularly:  Yes    Medication side effects:  Not applicable    Additional concerns today:  Yes    HISTORY OF PRESENT ILLNESS  The patient presents for establishment of care.    She is seeing an OB/GYN in [place]. This is her first baby. She is 30 weeks' gestation. She has been trying to be optimistic, but she feels like it could be worse and she has harder days ahead of her. The midwife group seems to be concerned about her as her feet are starting to swell. Her estimated due date is 12/04/2023. She has been increasing iron in her diet as she was told that her hemoglobin was a bit low.    She takes sertraline 25 mg every day. She has 50 mg pills that she cuts in half. She wants to get a refill if she runs out.    She had a mole and a pimple next to it. It turned into a boil and her eyes swelled up. She had to go to urgent care because she did not have primary care. She has been having skin sensitivity. She was put on 10 days of antibiotics, and it cleared up.        Supplemental Information  She takes folic acid and prenatal vitamins.              Have you ever done Advance Care Planning? (For example, a Health Directive, POLST, or a discussion with a medical provider or your loved ones about your wishes): No, advance care planning information given to patient to review.  Patient plans to discuss their wishes with loved ones or provider.      Social History     Tobacco Use    Smoking status: Former     Types: Cigarettes     Quit date:  "2/10/2020     Years since quitting: 3.6    Smokeless tobacco: Never   Substance Use Topics    Alcohol use: Not Currently         9/18/2023     2:21 PM   Alcohol Use   Prescreen: >3 drinks/day or >7 drinks/week? Not Applicable     Reviewed orders with patient.  Reviewed health maintenance and updated orders accordingly - Yes  Lab work is in process  Labs reviewed in EPIC    Breast Cancer Screening:    FHS-7:        No data to display                  Pertinent mammograms are reviewed under the imaging tab.    History of abnormal Pap smear: pt is pregnant     Reviewed and updated as needed this visit by clinical staff   Tobacco  Allergies  Meds              Reviewed and updated as needed this visit by Provider                     Review of Systems   Constitutional:  Negative for chills and fever.   HENT:  Positive for congestion. Negative for ear pain, hearing loss and sore throat.    Eyes:  Negative for pain and visual disturbance.   Respiratory:  Negative for cough and shortness of breath.    Cardiovascular:  Positive for peripheral edema. Negative for chest pain and palpitations.   Gastrointestinal:  Positive for heartburn. Negative for abdominal pain, constipation, diarrhea, hematochezia and nausea.   Breasts:  Negative for tenderness, breast mass and discharge.   Genitourinary:  Negative for dysuria, frequency, genital sores, hematuria, pelvic pain, urgency, vaginal bleeding and vaginal discharge.   Musculoskeletal:  Negative for arthralgias, joint swelling and myalgias.   Skin:  Negative for rash.   Neurological:  Negative for dizziness, weakness, headaches and paresthesias.   Psychiatric/Behavioral:  Negative for mood changes. The patient is not nervous/anxious.           OBJECTIVE:   /64   Pulse 93   Temp 98.2  F (36.8  C) (Temporal)   Resp 18   Ht 1.66 m (5' 5.35\")   Wt 90.5 kg (199 lb 9.6 oz)   LMP 02/27/2023   SpO2 98%   BMI 32.86 kg/m    Physical Exam  GENERAL: healthy, alert and no " "distress  EYES: Eyes grossly normal to inspection, PERRL and conjunctivae and sclerae normal  RESP: lungs clear to auscultation - no rales, rhonchi or wheezes  CV: regular rate and rhythm, normal S1 S2, no S3 or S4, no murmur, click or rub, no peripheral edema and peripheral pulses strong  MS: no gross musculoskeletal defects noted, no edema  SKIN: no suspicious lesions or rashes  NEURO: Normal strength and tone, mentation intact and speech normal  PSYCH: mentation appears normal, affect normal/bright    Diagnostic Test Results:  Labs reviewed in Epic  No results found for any visits on 09/21/23.    ASSESSMENT/PLAN:   (Z00.00) Routine general medical examination at a health care facility  (primary encounter diagnosis)  Comment: Stable  Plan: REVIEW OF HEALTH MAINTENANCE PROTOCOL ORDERS            (F33.0) Mild recurrent major depression (H)  Comment: Chronic, stable. Refill sent   Plan: sertraline (ZOLOFT) 25 MG tablet     Dictation Disclaimer: Some of this Note has been completed with voice-recognition dictation software. Although errors are generally corrected real-time, there is the potential for a rare error to be present in the completed chart.            Patient has been advised of split billing requirements and indicates understanding: Yes      COUNSELING:  Reviewed preventive health counseling, as reflected in patient instructions      BMI:   Estimated body mass index is 32.86 kg/m  as calculated from the following:    Height as of this encounter: 1.66 m (5' 5.35\").    Weight as of this encounter: 90.5 kg (199 lb 9.6 oz).   Weight management plan: pt is pregnant      She reports that she quit smoking about 3 years ago. Her smoking use included cigarettes. She has never used smokeless tobacco.          TREVOR GABRIEL MD  [facility]  "

## 2023-09-25 ENCOUNTER — THERAPY VISIT (OUTPATIENT)
Dept: PHYSICAL THERAPY | Facility: CLINIC | Age: 38
End: 2023-09-25
Attending: ADVANCED PRACTICE MIDWIFE
Payer: COMMERCIAL

## 2023-09-25 DIAGNOSIS — N81.89 PELVIC FLOOR WEAKNESS: ICD-10-CM

## 2023-09-25 PROCEDURE — 97535 SELF CARE MNGMENT TRAINING: CPT | Mod: GP

## 2023-09-25 PROCEDURE — 97161 PT EVAL LOW COMPLEX 20 MIN: CPT | Mod: GP

## 2023-09-25 PROCEDURE — 97110 THERAPEUTIC EXERCISES: CPT | Mod: GP

## 2023-09-27 ENCOUNTER — PRENATAL OFFICE VISIT (OUTPATIENT)
Dept: MIDWIFE SERVICES | Facility: CLINIC | Age: 38
End: 2023-09-27
Payer: COMMERCIAL

## 2023-09-27 VITALS
DIASTOLIC BLOOD PRESSURE: 73 MMHG | SYSTOLIC BLOOD PRESSURE: 121 MMHG | BODY MASS INDEX: 33.09 KG/M2 | OXYGEN SATURATION: 97 % | HEART RATE: 97 BPM | WEIGHT: 201 LBS

## 2023-09-27 DIAGNOSIS — Z34.03 ENCOUNTER FOR SUPERVISION OF NORMAL FIRST PREGNANCY, THIRD TRIMESTER: Primary | ICD-10-CM

## 2023-09-27 DIAGNOSIS — Z23 NEED FOR PROPHYLACTIC VACCINATION AND INOCULATION AGAINST INFLUENZA: ICD-10-CM

## 2023-09-27 PROCEDURE — 90686 IIV4 VACC NO PRSV 0.5 ML IM: CPT | Performed by: ADVANCED PRACTICE MIDWIFE

## 2023-09-27 PROCEDURE — 90471 IMMUNIZATION ADMIN: CPT | Performed by: ADVANCED PRACTICE MIDWIFE

## 2023-09-27 PROCEDURE — 99207 PR PRENATAL VISIT: CPT | Performed by: ADVANCED PRACTICE MIDWIFE

## 2023-09-27 NOTE — PROGRESS NOTES
30w2d  Kelly feeling well. Has been very active lately and putting her feet up every couple of hours. Encouraged her to listen to her body and rest when feeling like she needs to rest. Endorses fetal movement. Is taking birth classes with her partner and came with a lot of good questions today about birth. Flu shot given today, would like to get the COVID booster at the next visit. Denies headache, visual changes, LOF, bleeding, or regular contractions. RTC in 2 weeks.    Lilliana WELLS    I was present with the JANEL student who participated in the service and in the documentation of the services provided. I have verified the history and personally performed the physical exam and medical decision making, as documented by the student and edited by me.     Yolanda Gonsales, ROXANNA, APRN ROXANNA MCKNIGHT

## 2023-10-06 ENCOUNTER — THERAPY VISIT (OUTPATIENT)
Dept: PHYSICAL THERAPY | Facility: CLINIC | Age: 38
End: 2023-10-06
Payer: COMMERCIAL

## 2023-10-06 DIAGNOSIS — N81.89 PELVIC FLOOR WEAKNESS: Primary | ICD-10-CM

## 2023-10-06 PROCEDURE — 97530 THERAPEUTIC ACTIVITIES: CPT | Mod: GP

## 2023-10-13 ENCOUNTER — PRENATAL OFFICE VISIT (OUTPATIENT)
Dept: MIDWIFE SERVICES | Facility: CLINIC | Age: 38
End: 2023-10-13
Payer: COMMERCIAL

## 2023-10-13 ENCOUNTER — TELEPHONE (OUTPATIENT)
Dept: MIDWIFE SERVICES | Facility: CLINIC | Age: 38
End: 2023-10-13

## 2023-10-13 VITALS
DIASTOLIC BLOOD PRESSURE: 78 MMHG | SYSTOLIC BLOOD PRESSURE: 127 MMHG | TEMPERATURE: 99.1 F | HEART RATE: 85 BPM | WEIGHT: 204 LBS | BODY MASS INDEX: 33.58 KG/M2

## 2023-10-13 DIAGNOSIS — Z29.11 NEED FOR RSV IMMUNIZATION: ICD-10-CM

## 2023-10-13 DIAGNOSIS — O09.513 PRIMIGRAVIDA OF ADVANCED MATERNAL AGE IN THIRD TRIMESTER: Primary | ICD-10-CM

## 2023-10-13 PROCEDURE — 90471 IMMUNIZATION ADMIN: CPT | Performed by: ADVANCED PRACTICE MIDWIFE

## 2023-10-13 PROCEDURE — 99207 PR PRENATAL VISIT: CPT | Performed by: ADVANCED PRACTICE MIDWIFE

## 2023-10-13 PROCEDURE — 90678 RSV VACC PREF BIVALENT IM: CPT | Performed by: ADVANCED PRACTICE MIDWIFE

## 2023-10-13 NOTE — PROGRESS NOTES
32w4d  Kelly feeling very pregnant. Having some discomfort in her pelvic bone, swollen hands/ carpal tunnel, and round ligament pain. Kelly received her RSV vaccine today. Has some great questions about postpartum period. Baby coming soon is getting more real. Baby shower this week. Denies headaches, visual changes, contractions, bleeding, LOF. Endorses fetal movement. RTC in 2 weeks.    Lilliana WELLS    I was present with the CNM student who participated in the service and in the documentation of the services provided. I have verified the history and personally performed the physical exam and medical decision making, as documented by the student and edited by me. RAJ Lara CNM

## 2023-10-27 ENCOUNTER — PRENATAL OFFICE VISIT (OUTPATIENT)
Dept: MIDWIFE SERVICES | Facility: CLINIC | Age: 38
End: 2023-10-27
Payer: COMMERCIAL

## 2023-10-27 VITALS
WEIGHT: 210.9 LBS | DIASTOLIC BLOOD PRESSURE: 82 MMHG | HEART RATE: 80 BPM | TEMPERATURE: 97.8 F | OXYGEN SATURATION: 96 % | BODY MASS INDEX: 35.14 KG/M2 | HEIGHT: 65 IN | SYSTOLIC BLOOD PRESSURE: 128 MMHG

## 2023-10-27 DIAGNOSIS — O09.513 PRIMIGRAVIDA OF ADVANCED MATERNAL AGE IN THIRD TRIMESTER: Primary | ICD-10-CM

## 2023-10-27 DIAGNOSIS — O99.013 ANEMIA DURING PREGNANCY IN THIRD TRIMESTER: ICD-10-CM

## 2023-10-27 LAB
ERYTHROCYTE [DISTWIDTH] IN BLOOD BY AUTOMATED COUNT: 12.5 % (ref 10–15)
HCT VFR BLD AUTO: 35 % (ref 35–47)
HGB BLD-MCNC: 12 G/DL (ref 11.7–15.7)
MCH RBC QN AUTO: 32.2 PG (ref 26.5–33)
MCHC RBC AUTO-ENTMCNC: 34.3 G/DL (ref 31.5–36.5)
MCV RBC AUTO: 94 FL (ref 78–100)
PLATELET # BLD AUTO: 192 10E3/UL (ref 150–450)
RBC # BLD AUTO: 3.73 10E6/UL (ref 3.8–5.2)
WBC # BLD AUTO: 9 10E3/UL (ref 4–11)

## 2023-10-27 PROCEDURE — 85027 COMPLETE CBC AUTOMATED: CPT | Performed by: ADVANCED PRACTICE MIDWIFE

## 2023-10-27 PROCEDURE — 99207 PR PRENATAL VISIT: CPT | Performed by: ADVANCED PRACTICE MIDWIFE

## 2023-10-27 PROCEDURE — 36415 COLL VENOUS BLD VENIPUNCTURE: CPT | Performed by: ADVANCED PRACTICE MIDWIFE

## 2023-10-27 NOTE — PROGRESS NOTES
34w4d  Kelly is feeling okay today. Reports good fetal movement. Denies leaking of fluid, vaginal bleeding, regular uterine contractions, headache, visual changes, or other concerns. She has symptoms of carpal tunnel and swelling in her hands. No swelling in feet or ankles. Wearing braces for carpal tunnel and using ice. Plan to check CBC today due to anemia in pregnancy. She is not taking iron currently, was counseled to eat iron rich foods. Gave handouts: Any Day Now, My Labor and Birth Wishes, What I Wish I'd Known. She took CBE class at [facility], planning nitrous oxide and epidural. RTC in 2 weeks for GBS, BSUS.    RAJ Hilliadr CNM

## 2023-11-09 ENCOUNTER — TELEPHONE (OUTPATIENT)
Dept: NURSING | Facility: CLINIC | Age: 38
End: 2023-11-09
Payer: COMMERCIAL

## 2023-11-09 NOTE — TELEPHONE ENCOUNTER
Tylenol - 650 mg  Hydration is in question - not good last 4 days  Smoothie for breakfast  No prior BP concern  No right rib pain or vision concerns  No LOF/ bleeding or increase swelling  Baby moving well  Recommended protein with lunch, hydrate and rest.  Pt has appointment 11/10  Routed to on-call CNM if further advisement

## 2023-11-09 NOTE — TELEPHONE ENCOUNTER
Agree with plan of increasing hydration, tylenol, rest and  BP check in clinic tomorrow in clinic.Call if worsening HA or visual concerns or other symptoms.     Yolanda Gonsales, CNM, APRN CNM

## 2023-11-10 ENCOUNTER — PRENATAL OFFICE VISIT (OUTPATIENT)
Dept: MIDWIFE SERVICES | Facility: CLINIC | Age: 38
End: 2023-11-10
Payer: COMMERCIAL

## 2023-11-10 VITALS
WEIGHT: 212.2 LBS | BODY MASS INDEX: 35.31 KG/M2 | DIASTOLIC BLOOD PRESSURE: 84 MMHG | OXYGEN SATURATION: 98 % | SYSTOLIC BLOOD PRESSURE: 133 MMHG | HEART RATE: 90 BPM

## 2023-11-10 DIAGNOSIS — Z34.03 ENCOUNTER FOR SUPERVISION OF NORMAL FIRST PREGNANCY IN THIRD TRIMESTER: Primary | ICD-10-CM

## 2023-11-10 PROCEDURE — 99207 PR PRENATAL VISIT: CPT | Performed by: ADVANCED PRACTICE MIDWIFE

## 2023-11-10 PROCEDURE — 87653 STREP B DNA AMP PROBE: CPT | Performed by: ADVANCED PRACTICE MIDWIFE

## 2023-11-10 NOTE — PROGRESS NOTES
36w4d   Pt here with Judson.  Struggling with end of pregnancy complaints, carpel tunnel, heart burn, edema.  Had a HA yesterday that resolved with drinking more water.  GBS today and brief BSUS was vertex.  Pt had hgb drawn last time and was 12.0.  RTC 1 wk RAJ Hernandez CNM

## 2023-11-11 LAB — GP B STREP DNA SPEC QL NAA+PROBE: NEGATIVE

## 2023-11-15 ENCOUNTER — PRENATAL OFFICE VISIT (OUTPATIENT)
Dept: MIDWIFE SERVICES | Facility: CLINIC | Age: 38
End: 2023-11-15
Payer: COMMERCIAL

## 2023-11-15 VITALS
HEART RATE: 97 BPM | WEIGHT: 214.5 LBS | DIASTOLIC BLOOD PRESSURE: 85 MMHG | TEMPERATURE: 98.4 F | SYSTOLIC BLOOD PRESSURE: 128 MMHG | BODY MASS INDEX: 35.69 KG/M2

## 2023-11-15 DIAGNOSIS — O09.513 PRIMIGRAVIDA OF ADVANCED MATERNAL AGE IN THIRD TRIMESTER: Primary | ICD-10-CM

## 2023-11-15 PROCEDURE — 99207 PR PRENATAL VISIT: CPT | Performed by: ADVANCED PRACTICE MIDWIFE

## 2023-11-15 NOTE — PROGRESS NOTES
37w2d  Feeling ok, worsening carpal tunnel symptoms, already wearing braces.  and works on computer all day which exacerbates symptoms. BLE swelling that has been gradual over the last few weeks, wearing compression socks. Denies HA, visual changes. BP is baseline for this pregnancy - 120's/70's-80's. Reviewed s/sx of preeclampsia and when to call or come in for evaluation. Baby is active. No regular contractions, LOF or bleeding. RTC weekly. MML

## 2023-11-21 ENCOUNTER — VIRTUAL VISIT (OUTPATIENT)
Dept: MIDWIFE SERVICES | Facility: CLINIC | Age: 38
End: 2023-11-21
Payer: COMMERCIAL

## 2023-11-21 ENCOUNTER — TELEPHONE (OUTPATIENT)
Dept: NURSING | Facility: CLINIC | Age: 38
End: 2023-11-21

## 2023-11-21 DIAGNOSIS — O09.513 PRIMIGRAVIDA OF ADVANCED MATERNAL AGE IN THIRD TRIMESTER: Primary | ICD-10-CM

## 2023-11-21 DIAGNOSIS — Z3A.38 38 WEEKS GESTATION OF PREGNANCY: ICD-10-CM

## 2023-11-21 DIAGNOSIS — U07.1 INFECTION DUE TO 2019 NOVEL CORONAVIRUS: ICD-10-CM

## 2023-11-21 PROCEDURE — 99207 PR PRENATAL VISIT: CPT | Mod: VID | Performed by: ADVANCED PRACTICE MIDWIFE

## 2023-11-21 NOTE — TELEPHONE ENCOUNTER
Pt Partner called in and pt has tested positive for Covid.  Pt appointment changed to telephone and provider aware.

## 2023-11-21 NOTE — PROGRESS NOTES
Kelly is a 38 year old who is being evaluated via a billable telephone visit.      How would you like to [facility] your [facility]? [facility]    Assessment & Plan     (O09.513) Primigravida of advanced maternal age in third trimester  (primary encounter diagnosis)    (Z3A.38) 38 weeks gestation of pregnancy    (U07.1) Infection due to 2019 novel coronavirus  Plan: nirmatrelvir and ritonavir (PAXLOVID) 300         mg/100 mg therapy pack      [facility]    Subjective   Kelly is a 38 year old, presenting for the following health issues:  Prenatal Care    HPI     Kelly is being seen today by phone because she tested positive for Covid yesterday. Baby is active, denies bleeding, leaking of fluid, regular contractions. She has had a small headache on/off. On Sunday, she noted increased congestion. She has had some congestion, but it seemed worsened on Sunday. Yesterday, she went to work, but was feeling worse Monday night, so took 2 covid tests; both tests were positive. She now feels quite ill with body aches, cough, congestion, chills, weakness and nausea. She denies vomiting, and has not taken her temp, so is unsure if she has a fever.     Discussed increased risks with Covid and pregnancy, and recommendation for Paxlovid. She is agreeable. Orders placed. Discussed that she should present to the hospital with worsening congestion causing shortness of breath, dizziness or feeling faint. Encouraged good hydration, and to call if she is not keeping food down, or if she has a temp not responsive to Tylenol.           Objective       Vitals:  No vitals were obtained today due to virtual visit.    Physical Exam   GENERAL: alert and no distress  RESP: speech is clear and well paced; no coughing during our visit, no audible wheezes  PSYCH: Mentation appears normal, affect normal, judgement and insight intact, normal speech            Telephone-Visit Details    Type of service:  Telephone  Visit     Originating Location (pt. Location): Home    Distant Location (provider location):  On-site  Platform used: telephone    Call start time: 1424  Call end time:  1432  Total: 8 min    Jerry Cho CNM

## 2023-11-24 ENCOUNTER — ANESTHESIA EVENT (OUTPATIENT)
Dept: OBGYN | Facility: CLINIC | Age: 38
End: 2023-11-24
Payer: COMMERCIAL

## 2023-11-24 ENCOUNTER — TELEPHONE (OUTPATIENT)
Dept: MIDWIFE SERVICES | Facility: CLINIC | Age: 38
End: 2023-11-24
Payer: COMMERCIAL

## 2023-11-24 ENCOUNTER — HOSPITAL ENCOUNTER (INPATIENT)
Facility: CLINIC | Age: 38
LOS: 5 days | Discharge: HOME HEALTH | End: 2023-11-29
Attending: ADVANCED PRACTICE MIDWIFE | Admitting: ADVANCED PRACTICE MIDWIFE
Payer: COMMERCIAL

## 2023-11-24 ENCOUNTER — ANESTHESIA (OUTPATIENT)
Dept: OBGYN | Facility: CLINIC | Age: 38
End: 2023-11-24
Payer: COMMERCIAL

## 2023-11-24 DIAGNOSIS — Z98.891 STATUS POST CESAREAN SECTION: ICD-10-CM

## 2023-11-24 DIAGNOSIS — N76.0 BV (BACTERIAL VAGINOSIS): ICD-10-CM

## 2023-11-24 DIAGNOSIS — B96.89 BV (BACTERIAL VAGINOSIS): ICD-10-CM

## 2023-11-24 DIAGNOSIS — O14.10 SEVERE PRE-ECLAMPSIA, ANTEPARTUM: ICD-10-CM

## 2023-11-24 DIAGNOSIS — I10 HYPERTENSION, UNSPECIFIED TYPE: ICD-10-CM

## 2023-11-24 DIAGNOSIS — G89.18 ACUTE POST-OPERATIVE PAIN: ICD-10-CM

## 2023-11-24 PROBLEM — Z36.89 ENCOUNTER FOR TRIAGE IN PREGNANT PATIENT: Status: ACTIVE | Noted: 2023-11-24

## 2023-11-24 LAB
ABO/RH(D): ABNORMAL
ALT SERPL W P-5'-P-CCNC: 19 U/L (ref 0–50)
ANTIBODY ID: NORMAL
ANTIBODY SCREEN: POSITIVE
AST SERPL W P-5'-P-CCNC: 24 U/L (ref 0–45)
CLUE CELLS: PRESENT
CREAT SERPL-MCNC: 0.62 MG/DL (ref 0.51–0.95)
EGFRCR SERPLBLD CKD-EPI 2021: >90 ML/MIN/1.73M2
ERYTHROCYTE [DISTWIDTH] IN BLOOD BY AUTOMATED COUNT: 12.7 % (ref 10–15)
HCT VFR BLD AUTO: 38.8 % (ref 35–47)
HGB BLD-MCNC: 13.6 G/DL (ref 11.7–15.7)
MCH RBC QN AUTO: 32.4 PG (ref 26.5–33)
MCHC RBC AUTO-ENTMCNC: 35.1 G/DL (ref 31.5–36.5)
MCV RBC AUTO: 92 FL (ref 78–100)
PLATELET # BLD AUTO: 194 10E3/UL (ref 150–450)
RBC # BLD AUTO: 4.2 10E6/UL (ref 3.8–5.2)
SPECIMEN EXPIRATION DATE: ABNORMAL
SPECIMEN EXPIRATION DATE: NORMAL
TRICHOMONAS, WET PREP: ABNORMAL
WBC # BLD AUTO: 11.6 10E3/UL (ref 4–11)
WBC'S/HIGH POWER FIELD, WET PREP: ABNORMAL
YEAST, WET PREP: ABNORMAL

## 2023-11-24 PROCEDURE — 87210 SMEAR WET MOUNT SALINE/INK: CPT | Performed by: ADVANCED PRACTICE MIDWIFE

## 2023-11-24 PROCEDURE — 00HU33Z INSERTION OF INFUSION DEVICE INTO SPINAL CANAL, PERCUTANEOUS APPROACH: ICD-10-PCS | Performed by: STUDENT IN AN ORGANIZED HEALTH CARE EDUCATION/TRAINING PROGRAM

## 2023-11-24 PROCEDURE — 84460 ALANINE AMINO (ALT) (SGPT): CPT | Performed by: ADVANCED PRACTICE MIDWIFE

## 2023-11-24 PROCEDURE — 250N000009 HC RX 250: Performed by: ADVANCED PRACTICE MIDWIFE

## 2023-11-24 PROCEDURE — 258N000003 HC RX IP 258 OP 636: Performed by: STUDENT IN AN ORGANIZED HEALTH CARE EDUCATION/TRAINING PROGRAM

## 2023-11-24 PROCEDURE — 84156 ASSAY OF PROTEIN URINE: CPT | Performed by: ADVANCED PRACTICE MIDWIFE

## 2023-11-24 PROCEDURE — 258N000003 HC RX IP 258 OP 636: Performed by: ADVANCED PRACTICE MIDWIFE

## 2023-11-24 PROCEDURE — 86870 RBC ANTIBODY IDENTIFICATION: CPT | Performed by: ADVANCED PRACTICE MIDWIFE

## 2023-11-24 PROCEDURE — 250N000013 HC RX MED GY IP 250 OP 250 PS 637: Performed by: ADVANCED PRACTICE MIDWIFE

## 2023-11-24 PROCEDURE — 250N000011 HC RX IP 250 OP 636

## 2023-11-24 PROCEDURE — 120N000002 HC R&B MED SURG/OB UMMC

## 2023-11-24 PROCEDURE — 82565 ASSAY OF CREATININE: CPT | Performed by: ADVANCED PRACTICE MIDWIFE

## 2023-11-24 PROCEDURE — 85027 COMPLETE CBC AUTOMATED: CPT | Performed by: ADVANCED PRACTICE MIDWIFE

## 2023-11-24 PROCEDURE — 84450 TRANSFERASE (AST) (SGOT): CPT | Performed by: ADVANCED PRACTICE MIDWIFE

## 2023-11-24 PROCEDURE — 250N000011 HC RX IP 250 OP 636: Mod: JZ | Performed by: STUDENT IN AN ORGANIZED HEALTH CARE EDUCATION/TRAINING PROGRAM

## 2023-11-24 PROCEDURE — 250N000011 HC RX IP 250 OP 636: Performed by: ADVANCED PRACTICE MIDWIFE

## 2023-11-24 PROCEDURE — 86920 COMPATIBILITY TEST SPIN: CPT

## 2023-11-24 PROCEDURE — 3E0R3BZ INTRODUCTION OF ANESTHETIC AGENT INTO SPINAL CANAL, PERCUTANEOUS APPROACH: ICD-10-PCS | Performed by: STUDENT IN AN ORGANIZED HEALTH CARE EDUCATION/TRAINING PROGRAM

## 2023-11-24 PROCEDURE — 250N000011 HC RX IP 250 OP 636: Performed by: STUDENT IN AN ORGANIZED HEALTH CARE EDUCATION/TRAINING PROGRAM

## 2023-11-24 PROCEDURE — 36415 COLL VENOUS BLD VENIPUNCTURE: CPT | Performed by: ADVANCED PRACTICE MIDWIFE

## 2023-11-24 PROCEDURE — 86900 BLOOD TYPING SEROLOGIC ABO: CPT | Performed by: ADVANCED PRACTICE MIDWIFE

## 2023-11-24 PROCEDURE — 86780 TREPONEMA PALLIDUM: CPT | Performed by: ADVANCED PRACTICE MIDWIFE

## 2023-11-24 PROCEDURE — G0463 HOSPITAL OUTPT CLINIC VISIT: HCPCS

## 2023-11-24 RX ORDER — NALOXONE HYDROCHLORIDE 0.4 MG/ML
0.2 INJECTION, SOLUTION INTRAMUSCULAR; INTRAVENOUS; SUBCUTANEOUS
Status: DISCONTINUED | OUTPATIENT
Start: 2023-11-24 | End: 2023-11-25 | Stop reason: HOSPADM

## 2023-11-24 RX ORDER — OXYTOCIN 10 [USP'U]/ML
10 INJECTION, SOLUTION INTRAMUSCULAR; INTRAVENOUS
Status: DISCONTINUED | OUTPATIENT
Start: 2023-11-24 | End: 2023-11-25 | Stop reason: HOSPADM

## 2023-11-24 RX ORDER — METRONIDAZOLE 500 MG/1
500 TABLET ORAL 2 TIMES DAILY
Qty: 14 TABLET | Refills: 0 | Status: SHIPPED | OUTPATIENT
Start: 2023-11-24 | End: 2023-12-01

## 2023-11-24 RX ORDER — MISOPROSTOL 200 UG/1
800 TABLET ORAL
Status: DISCONTINUED | OUTPATIENT
Start: 2023-11-24 | End: 2023-11-25 | Stop reason: HOSPADM

## 2023-11-24 RX ORDER — METOCLOPRAMIDE 10 MG/1
10 TABLET ORAL EVERY 6 HOURS PRN
Status: DISCONTINUED | OUTPATIENT
Start: 2023-11-24 | End: 2023-11-25 | Stop reason: HOSPADM

## 2023-11-24 RX ORDER — ONDANSETRON 4 MG/1
4 TABLET, ORALLY DISINTEGRATING ORAL EVERY 6 HOURS PRN
Status: DISCONTINUED | OUTPATIENT
Start: 2023-11-24 | End: 2023-11-25 | Stop reason: HOSPADM

## 2023-11-24 RX ORDER — PROCHLORPERAZINE MALEATE 10 MG
10 TABLET ORAL EVERY 6 HOURS PRN
Status: DISCONTINUED | OUTPATIENT
Start: 2023-11-24 | End: 2023-11-25 | Stop reason: HOSPADM

## 2023-11-24 RX ORDER — SODIUM CHLORIDE, SODIUM LACTATE, POTASSIUM CHLORIDE, CALCIUM CHLORIDE 600; 310; 30; 20 MG/100ML; MG/100ML; MG/100ML; MG/100ML
INJECTION, SOLUTION INTRAVENOUS CONTINUOUS
Status: DISCONTINUED | OUTPATIENT
Start: 2023-11-24 | End: 2023-11-25 | Stop reason: HOSPADM

## 2023-11-24 RX ORDER — NALBUPHINE HYDROCHLORIDE 20 MG/ML
2.5-5 INJECTION, SOLUTION INTRAMUSCULAR; INTRAVENOUS; SUBCUTANEOUS EVERY 6 HOURS PRN
Status: DISCONTINUED | OUTPATIENT
Start: 2023-11-24 | End: 2023-11-25

## 2023-11-24 RX ORDER — CITRIC ACID/SODIUM CITRATE 334-500MG
30 SOLUTION, ORAL ORAL
Status: DISCONTINUED | OUTPATIENT
Start: 2023-11-24 | End: 2023-11-25 | Stop reason: HOSPADM

## 2023-11-24 RX ORDER — LIDOCAINE 40 MG/G
CREAM TOPICAL
Status: DISCONTINUED | OUTPATIENT
Start: 2023-11-24 | End: 2023-11-24 | Stop reason: HOSPADM

## 2023-11-24 RX ORDER — NALOXONE HYDROCHLORIDE 0.4 MG/ML
0.4 INJECTION, SOLUTION INTRAMUSCULAR; INTRAVENOUS; SUBCUTANEOUS
Status: DISCONTINUED | OUTPATIENT
Start: 2023-11-24 | End: 2023-11-25 | Stop reason: HOSPADM

## 2023-11-24 RX ORDER — ACETAMINOPHEN 325 MG/1
650 TABLET ORAL EVERY 4 HOURS PRN
Status: DISCONTINUED | OUTPATIENT
Start: 2023-11-24 | End: 2023-11-25 | Stop reason: HOSPADM

## 2023-11-24 RX ORDER — OXYTOCIN/0.9 % SODIUM CHLORIDE 30/500 ML
1-24 PLASTIC BAG, INJECTION (ML) INTRAVENOUS CONTINUOUS
Status: DISCONTINUED | OUTPATIENT
Start: 2023-11-24 | End: 2023-11-25 | Stop reason: HOSPADM

## 2023-11-24 RX ORDER — METRONIDAZOLE 500 MG/1
500 TABLET ORAL 2 TIMES DAILY
Status: DISCONTINUED | OUTPATIENT
Start: 2023-11-24 | End: 2023-11-25

## 2023-11-24 RX ORDER — ONDANSETRON 2 MG/ML
4 INJECTION INTRAMUSCULAR; INTRAVENOUS EVERY 6 HOURS PRN
Status: DISCONTINUED | OUTPATIENT
Start: 2023-11-24 | End: 2023-11-25 | Stop reason: HOSPADM

## 2023-11-24 RX ORDER — KETOROLAC TROMETHAMINE 30 MG/ML
30 INJECTION, SOLUTION INTRAMUSCULAR; INTRAVENOUS
Status: ACTIVE | OUTPATIENT
Start: 2023-11-24 | End: 2023-11-29

## 2023-11-24 RX ORDER — HYDROXYZINE HYDROCHLORIDE 25 MG/1
50-100 TABLET, FILM COATED ORAL EVERY 8 HOURS PRN
Qty: 25 TABLET | Refills: 1 | Status: SHIPPED | OUTPATIENT
Start: 2023-11-24 | End: 2024-01-22

## 2023-11-24 RX ORDER — CARBOPROST TROMETHAMINE 250 UG/ML
250 INJECTION, SOLUTION INTRAMUSCULAR
Status: DISCONTINUED | OUTPATIENT
Start: 2023-11-24 | End: 2023-11-25 | Stop reason: HOSPADM

## 2023-11-24 RX ORDER — OXYTOCIN 10 [USP'U]/ML
10 INJECTION, SOLUTION INTRAMUSCULAR; INTRAVENOUS
Status: DISCONTINUED | OUTPATIENT
Start: 2023-11-24 | End: 2023-11-29 | Stop reason: HOSPADM

## 2023-11-24 RX ORDER — LIDOCAINE HYDROCHLORIDE 10 MG/ML
INJECTION, SOLUTION EPIDURAL; INFILTRATION; INTRACAUDAL; PERINEURAL
Status: DISCONTINUED
Start: 2023-11-24 | End: 2023-11-25 | Stop reason: HOSPADM

## 2023-11-24 RX ORDER — MISOPROSTOL 200 UG/1
TABLET ORAL
Status: DISCONTINUED
Start: 2023-11-24 | End: 2023-11-25 | Stop reason: HOSPADM

## 2023-11-24 RX ORDER — TRANEXAMIC ACID 10 MG/ML
1 INJECTION, SOLUTION INTRAVENOUS EVERY 30 MIN PRN
Status: DISCONTINUED | OUTPATIENT
Start: 2023-11-24 | End: 2023-11-25 | Stop reason: HOSPADM

## 2023-11-24 RX ORDER — OXYTOCIN/0.9 % SODIUM CHLORIDE 30/500 ML
100-340 PLASTIC BAG, INJECTION (ML) INTRAVENOUS CONTINUOUS PRN
Status: DISCONTINUED | OUTPATIENT
Start: 2023-11-24 | End: 2023-11-29 | Stop reason: HOSPADM

## 2023-11-24 RX ORDER — ACETAMINOPHEN 325 MG/1
975 TABLET ORAL ONCE
Status: COMPLETED | OUTPATIENT
Start: 2023-11-24 | End: 2023-11-24

## 2023-11-24 RX ORDER — METOCLOPRAMIDE HYDROCHLORIDE 5 MG/ML
10 INJECTION INTRAMUSCULAR; INTRAVENOUS EVERY 6 HOURS PRN
Status: DISCONTINUED | OUTPATIENT
Start: 2023-11-24 | End: 2023-11-25 | Stop reason: HOSPADM

## 2023-11-24 RX ORDER — MORPHINE SULFATE 10 MG/ML
10 INJECTION, SOLUTION INTRAMUSCULAR; INTRAVENOUS
Status: COMPLETED | OUTPATIENT
Start: 2023-11-24 | End: 2023-11-24

## 2023-11-24 RX ORDER — PROCHLORPERAZINE 25 MG
25 SUPPOSITORY, RECTAL RECTAL EVERY 12 HOURS PRN
Status: DISCONTINUED | OUTPATIENT
Start: 2023-11-24 | End: 2023-11-25 | Stop reason: HOSPADM

## 2023-11-24 RX ORDER — MISOPROSTOL 200 UG/1
400 TABLET ORAL
Status: DISCONTINUED | OUTPATIENT
Start: 2023-11-24 | End: 2023-11-25 | Stop reason: HOSPADM

## 2023-11-24 RX ORDER — SODIUM CHLORIDE, SODIUM LACTATE, POTASSIUM CHLORIDE, CALCIUM CHLORIDE 600; 310; 30; 20 MG/100ML; MG/100ML; MG/100ML; MG/100ML
INJECTION, SOLUTION INTRAVENOUS CONTINUOUS PRN
Status: DISCONTINUED | OUTPATIENT
Start: 2023-11-24 | End: 2023-11-25 | Stop reason: HOSPADM

## 2023-11-24 RX ORDER — LIDOCAINE 40 MG/G
CREAM TOPICAL
Status: DISCONTINUED | OUTPATIENT
Start: 2023-11-24 | End: 2023-11-25 | Stop reason: HOSPADM

## 2023-11-24 RX ORDER — IBUPROFEN 800 MG/1
800 TABLET, FILM COATED ORAL
Status: DISPENSED | OUTPATIENT
Start: 2023-11-24 | End: 2023-11-29

## 2023-11-24 RX ORDER — OXYTOCIN/0.9 % SODIUM CHLORIDE 30/500 ML
340 PLASTIC BAG, INJECTION (ML) INTRAVENOUS CONTINUOUS PRN
Status: DISCONTINUED | OUTPATIENT
Start: 2023-11-24 | End: 2023-11-25 | Stop reason: HOSPADM

## 2023-11-24 RX ORDER — FENTANYL CITRATE-0.9 % NACL/PF 10 MCG/ML
100 PLASTIC BAG, INJECTION (ML) INTRAVENOUS EVERY 5 MIN PRN
Status: DISCONTINUED | OUTPATIENT
Start: 2023-11-24 | End: 2023-11-25 | Stop reason: HOSPADM

## 2023-11-24 RX ORDER — SERTRALINE HYDROCHLORIDE 25 MG/1
25 TABLET, FILM COATED ORAL DAILY
Status: DISCONTINUED | OUTPATIENT
Start: 2023-11-25 | End: 2023-11-25

## 2023-11-24 RX ORDER — FENTANYL CITRATE 50 UG/ML
100 INJECTION, SOLUTION INTRAMUSCULAR; INTRAVENOUS
Status: DISCONTINUED | OUTPATIENT
Start: 2023-11-24 | End: 2023-11-24

## 2023-11-24 RX ORDER — OXYTOCIN/0.9 % SODIUM CHLORIDE 30/500 ML
PLASTIC BAG, INJECTION (ML) INTRAVENOUS
Status: DISCONTINUED
Start: 2023-11-24 | End: 2023-11-25 | Stop reason: HOSPADM

## 2023-11-24 RX ORDER — HYDROXYZINE HYDROCHLORIDE 50 MG/1
100 TABLET, FILM COATED ORAL EVERY 8 HOURS PRN
Status: DISCONTINUED | OUTPATIENT
Start: 2023-11-24 | End: 2023-11-25

## 2023-11-24 RX ORDER — METHYLERGONOVINE MALEATE 0.2 MG/ML
200 INJECTION INTRAVENOUS
Status: DISCONTINUED | OUTPATIENT
Start: 2023-11-24 | End: 2023-11-25 | Stop reason: HOSPADM

## 2023-11-24 RX ORDER — OXYTOCIN 10 [USP'U]/ML
INJECTION, SOLUTION INTRAMUSCULAR; INTRAVENOUS
Status: DISCONTINUED
Start: 2023-11-24 | End: 2023-11-25 | Stop reason: HOSPADM

## 2023-11-24 RX ADMIN — ACETAMINOPHEN 975 MG: 325 TABLET, FILM COATED ORAL at 17:16

## 2023-11-24 RX ADMIN — MORPHINE SULFATE 10 MG: 10 INJECTION, SOLUTION INTRAMUSCULAR; INTRAVENOUS at 18:22

## 2023-11-24 RX ADMIN — HYDROXYZINE HYDROCHLORIDE 100 MG: 50 TABLET, FILM COATED ORAL at 18:25

## 2023-11-24 RX ADMIN — SODIUM CHLORIDE, POTASSIUM CHLORIDE, SODIUM LACTATE AND CALCIUM CHLORIDE 1000 ML: 600; 310; 30; 20 INJECTION, SOLUTION INTRAVENOUS at 20:39

## 2023-11-24 RX ADMIN — SODIUM CHLORIDE, POTASSIUM CHLORIDE, SODIUM LACTATE AND CALCIUM CHLORIDE: 600; 310; 30; 20 INJECTION, SOLUTION INTRAVENOUS at 21:43

## 2023-11-24 RX ADMIN — METRONIDAZOLE 500 MG: 500 TABLET ORAL at 18:26

## 2023-11-24 RX ADMIN — Medication 2 MILLI-UNITS/MIN: at 23:31

## 2023-11-24 RX ADMIN — ROPIVACAINE HYDROCHLORIDE: 5 INJECTION, SOLUTION EPIDURAL; INFILTRATION; PERINEURAL at 21:43

## 2023-11-24 RX ADMIN — Medication 8 ML: at 21:20

## 2023-11-24 ASSESSMENT — ACTIVITIES OF DAILY LIVING (ADL)
ADLS_ACUITY_SCORE: 18

## 2023-11-24 NOTE — DISCHARGE INSTRUCTIONS
Checking Your Blood Pressure at Home  During and after pregnancy  How do I measure my blood pressure?  It s important to take the readings at the same time each day, such as morning and evening. Take your blood pressure before taking any morning medications.  How to get the most accurate reading  30 minutes before checking your blood pressure, avoid the following:  Drinking caffeine  Drinking alcohol  Eating  Smoking  Exercising  5 minutes before checking your blood pressure:  Use the bathroom and urinate so you have an empty bladder.  Sit still in a chair for around 5 minutes. Stay calm and relaxed and do not talk if possible.  To check your blood pressure:     Sit up straight in a chair.  Place your feet on the floor. Don t cross your ankles or legs.  Rest your arm at the level of your heart on a table or desk or on the arm of a chair. Use the same arm every day.  Pull up your shirt sleeve. Don t take the measurement over clothes.  Wrap the blood pressure cuff around the upper part of your left arm, 1 inch (2.5 cm) above your elbow.  Fit the cuff snugly around your arm. You should be able to place only one finger between the cuff and your arm.  Position the cord so that it rests in the bend of your elbow.  Press the power button.  Sit quietly while the cuff inflates and deflates.  Read the digital reading on the monitor screen and write the numbers down (record them) in a notebook.  Wait 2-3 minutes, then repeat the steps, starting at step 1.  Which features do you need?  Arm cuff monitors give the most exact readings.  Wrist and finger blood pressure monitors are often less exact.  Pick a blood pressure monitor that has passed tests to show they measure exactly. Blood pressure cuffs for sale in the [place] that have passed tests are listed on the website www.validatebp.org.  Some monitors that have passed tests are:  Omron 3 Series Upper Arm Blood Pressure Monitor (Model LA0255)  Omron 5 Series Upper Arm Blood  "Pressure Monitor (Model FH4361)  Omron 7 Series Upper Arm Blood Pressure Monitor (Model HEM-7320)  [employer] Upper Arm Blood Pressure Monitor with Talking Function (UA 1030T)  Don t use smartphone apps. There are many smartphone apps that claim to check your blood pressure using the pulse in your wrist or finger. These don t work. They haven t passed any tests. Don t give your clinic a blood pressure reading from a smartphone tamanna.  If you have a flexible spending account (FSA) or health savings account (HSA), you may wish to pay yourself back (reimburse) for the machine and cuff. A blood pressure monitor is an allowed over-the- counter (OTC) item to pay yourself back from these accounts.  Cuff size  The size of the arm cuff is a key feature. Make sure the cuff is the right size for your arm. If the cuff isn t the right size, readings will either be too high or low.  To know what size cuff to buy, measure the distance around your bicep (upper arm).  Use a flexible measuring tape or . Place the measuring tape FPC between your armpit and elbow. Measure the distance around your arm in inches.  You may need to buy a cuff apart from the machine to get the right size.  Cuff sizes and arm measurements  Small adult: 22 to 26 cm (8.7 to 10.2 inches)  Adult: 27 to 34 cm (10.6 to 13.4 inches)  Large adult: 35 to 44 cm (13.8 to 17.3 inches)  Adult thigh: 45 to 52 cm (17.7 to 20.5 inches)    Copyright statement\" content=\"For informational purposes only. Not to replace the advice of your health care provider. Photo: ID [id number]   [place]. Text copyright   2023 [facility]. All rights reserved. Clinically reviewed by [facility]. [employer] [id number] - REV 03/23.  Know Your Blood Pressure Numbers  For patients who've had a high blood pressure disorder of pregnancy  What to know about high blood pressure disorders of pregnancy  People who had high blood pressure " "during pregnancy may continue to have high blood pressure for up to 12 weeks after pregnancy. It can also raise your lifetime risk of chronic high blood pressure, heart disease and blood vessel disease. It is vital that you keep monitoring your blood pressure and taking steps to control it.   The general guidelines below are what your blood pressures mean.  A heart healthy lifestyle that includes blood pressure control can help reduce these risks. A good blood pressure goal is less than 130/80 mmHg long-term.  Talk to your provider about high blood pressure disorder of pregnancy and what this means for your lifelong health.   Know your numbers  Read \"Checking Your Blood Pressure at Home\" to learn the best way to take your blood pressure.  Refer to the next page for general guidelines about what your blood pressures mean and what to do about them. Follow these instructions unless your provider tells you something different.  For more resources, visit www.preeclampsia.org.  What to do if your blood pressure is high  Act right away if you have numbers in the yellow or red range--don't wait for a scheduled appointment.  When to call your provider  Regardless of your blood pressure, call your healthcare provider right away if you develop any of these symptoms:  Severe headache  Chest pain  Trouble breathing  Stomach pain  Changes in vision  Swelling in your hands and face.  Please say, \"I am having symptoms of high blood pressure. My provider told me to call and ask to be seen right away when I have these symptoms.\"  If you ARE pregnant OR   it has been less than 12 weeks since you delivered  Systolic pressure (top number) is....  Diastolic (bottom number) is.... Your blood pressure is....   160 or higher  or 110 or higher VERY HIGH. Check it again in 10 minutes, then contact your provider.   140 - 159  or 90 - 109 HIGH. Keep checking blood pressure 2 times a day. If your blood pressure is in this range for 2 readings, " contact your provider within 24 hours. We will discuss starting or increasing your blood pressure medicine.   100 - 139  and 60 - 89 NORMAL. Your blood pressure looks great!   Keep checking it 2 times a day.   Less than 100  or Less than 60 LOW. Check your blood pressure again in   10 minutes, then contact your provider. We may need to make changes to your blood pressure medicine.     Call your provider right away if you have these symptoms: a severe headache, vision changes, shortness of breath, chest pain, or right upper belly pain. Call even if your blood pressure is okay.  Call - if you feel the symptoms are severe and that it is an emergency.   If you are NOT pregnant OR   it has been more than 12 weeks since you delivered  Systolic pressure (top number) is....  Diastolic (bottom number) is.... Your blood pressure is....   Higher than 180 and/or Higher than 120 Hypertensive crisis. Call your doctor right away.   140 or higher or  90 or higher Hypertension Stage 2. Follow up with your provider.   130-139 or Less than 80 Hypertension Stage 1. Follow up with your provider.   120-129 and Less than 80 Elevated blood pressure (pre-hypertension). You are at higher risk of developing high blood pressure. Follow up with your provider.   Less than 120 and Less than 80 Normal.     Call your provider right away if you have these symptoms: a severe headache, vision changes, shortness of breath, chest pain, or right upper belly pain. Call even if your blood pressure is okay.  Call  if you feel the symptoms are severe and that it is an emergency.   For informational purposes only. Not to replace the advice of your health care provider. Copyright    [employer] [facility] [facility]. All rights reserved. Clinically reviewed by [facility]. [place] [id number] - .  Postop  Birth Instructions    Activity     Do not lift more than 10 pounds for 6 weeks after surgery.  Ask family and friends for  help when you need it.  No driving until you have stopped taking your pain medications (usually two weeks after surgery).  No heavy exercise or activity for 6 weeks.  Don't do anything that will put a strain on your surgery site.  Don't strain when using the toilet.  Your care team may prescribe a stool softener if you have problems with your bowel movements.     To care for your incision:     Keep the incision clean and dry.  Do not soak your incision in water. No swimming or hot tubs until it has fully healed. You may soak in the bathtub if the water level is below your incision.  Do not use peroxide, gel, cream, lotion, or ointment on your incision.  Adjust your clothes to avoid pressure on your surgery site (check the elastic in your underwear for example).     You may see a small amount of clear or pink drainage and this is normal.  Check with your health care provider:     If the drainage increases or has an odor.  If the incision reddens, you have swelling, or develop a rash.  If you have increased pain and the medicine we prescribed doesn't help.  If you have a fever above 100.4 F (38 C) with or without chills when placing thermometer under your tongue.   The area around your incision (surgery wound), will feel numb.  This is normal. The numbness should go away in less than a year.     Keep your hands clean:  Always wash your hands before touching your incision (surgery wound). This helps reduce your risk of infection. If your hands aren't dirty, you may use an alcohol hand-rub to clean your hands. Keep your nails clean and short.    Call your healthcare provider if you have any of these symptoms:     You soak a sanitary pad with blood within 1 hour, or you see blood clots larger than a golf ball.  Bleeding that lasts more than 6 weeks.  Vaginal discharge that smells bad.  Severe pain, cramping or tenderness in your lower belly area.  A need to urinate more frequently (use the toilet more often), more  urgently (use the toilet very quickly), or it burns when you urinate.  Nausea and vomiting.  Redness, swelling or pain around a vein in your leg.  Problems breastfeeding or a red or painful area on your breast.  Chest pain and cough or are gasping for air.  Problems with coping with sadness, anxiety or depression. If you have concerns about hurting yourself or the baby, call your provider immediately.    You have questions or concerns after you return home.     Preeclampsia   Call your doctor right away if you have any of the following:  - Edema (swelling) in your face or hands  - Rapid weight gain-about 1 pound or more in a day  - Headache  - Abdominal pain on your right side  - Vision problems (flashes or spots)  - You have questions or concerns once you return home.

## 2023-11-24 NOTE — CONFIDENTIAL NOTE
Caller reporting the following red-flag symptom(s): Pt partner Judson, believes pt is in labor    Per the system red-flag symptom policy, patient was instructed to:  speak with a Registered Nurse    Action:  Patient warm transferred to a Registered Nurse

## 2023-11-24 NOTE — PROGRESS NOTES
Kelly Wakefield is a 38 year old female,       Patient's last menstrual period was 2023.. Estimated Date of Delivery: Dec 4, 2023 is calculated from lmp and verified with U/S    [facility] for evaluation of uterine contractions    HPI Kelly started feeling contractions in the early morning. They are getting stronger and more uncomfortable. She also noted some blood tinged mucousy vaginal discharge. She reports normal fetal movement.     She currently has a Covid infection and is on day 3 of Paxlovid. Stopped experiencing symptoms 2 days ago.     PRENATAL COURSE  Prenatal care began at 8 wks gestation for a total of 8 prenatal visits.  Total wt gain 50  Prenatal course was complicated by    Patient Active Problem List    Diagnosis Date Noted    Encounter for triage in pregnant patient 2023     Priority: Medium    Pelvic floor weakness 2023     Priority: Medium    History of miscarriage, currently pregnant 2023     Priority: Medium     With D&C      Missed ab 2021     Priority: Medium    Multiple benign nevi 2015     Priority: Medium    Inflamed seborrheic keratosis 2015     Priority: Medium    Café au lait spot 2015     Priority: Medium       HISTORIES  No Known Allergies  Past Medical History:   Diagnosis Date    Anxiety     Varicella      Past Surgical History:   Procedure Laterality Date    APPENDECTOMY      COLONOSCOPY  2009    COLPOSCOPY      DILATION AND CURETTAGE      FOOT SURGERY Right 2015     Family History   Problem Relation Age of Onset    Thyroid Disease Mother     Breast Cancer Mother     Hypertension Mother     Kidney Disease Father     Heart Disease Father     Hypertension Father     Idiopathic pulmonary fibrosis Father     Kidney Cancer Father     Liver Cancer Father     Depression Sister     Anxiety Disorder Sister     Parkinsonism Maternal Grandmother     Leukemia Paternal Grandmother     Esophageal Cancer  "Paternal Grandfather     Cancer No family hx of         no family hx of skin cancer     Social History     Tobacco Use    Smoking status: Former     Types: Cigarettes     Quit date: 2/10/2020     Years since quitting: 3.7    Smokeless tobacco: Never   Substance Use Topics    Alcohol use: Not Currently       LABS:  Blood type O neg  Rhogam indicated and given on 9/15  Rubella immune   Treponema Pallidum Antibody  Negative    HIV    Non-reactive   Hepatitis B Non-reactive   GBS negative    ROS  C: NEGATIVE for fever, chills, change in weight  I: NEGATIVE for worrisome rashes, moles or lesions  E/M: NEGATIVE for ear, mouth and throat problems  R: NEGATIVE for significant cough or SOB  CV: NEGATIVE for chest pain, palpitations or peripheral edema  GI: NEGATIVE for nausea, abdominal pain, heartburn, or change in bowel habits  : NEGATIVE for frequency, dysuria, or hematuria  PSYCHIATRIC:  NEGATIVE for depression, anxiety or other mental health concerns      PHYSICAL EXAM:  Ht 1.651 m (5' 5\")   Wt 97.3 kg (214 lb 8 oz)   LMP 02/27/2023   BMI 35.69 kg/m    General appearance healthy, alert, and active  Neuro:  denies headache and visual disturbances  Psych: Mentation normal and bright   Legs: 2+/2+, no clonus, no edema       Abdomen: gravid, single vertex fetus, non-tender, EFW7 lbs. Pt is gloria every 2-4 minutes, lasting 40-50 seconds and palpates mild    FETAL HEART TONES: baseline 135 with moderate FHR variability and accelerations.  No decelerations present.     MEMBRANES: Membranes are intact    PELVIC EXAM: closed/ 70% / -3  BLOODY SHOW:: yes scant and brown    Wet prep done    ASSESSMENT:  IUP @ 38 wks gestation in in latent labor  NST  reactive          PLAN:  Rx given for metronidazole for bacterial vaginosis  Also prescription given for hydroxyzine  for prodromal labor.   Home with Labor instructions per discharge instruction form    Yolanda Gonsales CNM, APRN JANELM          "

## 2023-11-25 LAB
ALBUMIN MFR UR ELPH: 10.5 MG/DL
ALT SERPL W P-5'-P-CCNC: 14 U/L (ref 0–50)
AST SERPL W P-5'-P-CCNC: 29 U/L (ref 0–45)
BLD PROD TYP BPU: NORMAL
BLD PROD TYP BPU: NORMAL
BLOOD COMPONENT TYPE: NORMAL
BLOOD COMPONENT TYPE: NORMAL
CODING SYSTEM: NORMAL
CODING SYSTEM: NORMAL
CREAT SERPL-MCNC: 0.56 MG/DL (ref 0.51–0.95)
CREAT UR-MCNC: 54.7 MG/DL
CROSSMATCH: NORMAL
CROSSMATCH: NORMAL
EGFRCR SERPLBLD CKD-EPI 2021: >90 ML/MIN/1.73M2
ERYTHROCYTE [DISTWIDTH] IN BLOOD BY AUTOMATED COUNT: 12.7 % (ref 10–15)
HCT VFR BLD AUTO: 31.5 % (ref 35–47)
HGB BLD-MCNC: 10.9 G/DL (ref 11.7–15.7)
MCH RBC QN AUTO: 32.3 PG (ref 26.5–33)
MCHC RBC AUTO-ENTMCNC: 34.6 G/DL (ref 31.5–36.5)
MCV RBC AUTO: 94 FL (ref 78–100)
PLATELET # BLD AUTO: 179 10E3/UL (ref 150–450)
PROT/CREAT 24H UR: 0.19 MG/MG CR (ref 0–0.2)
RBC # BLD AUTO: 3.37 10E6/UL (ref 3.8–5.2)
T PALLIDUM AB SER QL: NONREACTIVE
UNIT ABO/RH: NORMAL
UNIT ABO/RH: NORMAL
UNIT NUMBER: NORMAL
UNIT NUMBER: NORMAL
UNIT STATUS: NORMAL
UNIT STATUS: NORMAL
UNIT TYPE ISBT: 9500
UNIT TYPE ISBT: 9500
WBC # BLD AUTO: 18.7 10E3/UL (ref 4–11)

## 2023-11-25 PROCEDURE — 250N000011 HC RX IP 250 OP 636: Mod: JZ

## 2023-11-25 PROCEDURE — 250N000011 HC RX IP 250 OP 636

## 2023-11-25 PROCEDURE — 250N000009 HC RX 250

## 2023-11-25 PROCEDURE — 88307 TISSUE EXAM BY PATHOLOGIST: CPT | Mod: TC

## 2023-11-25 PROCEDURE — 370N000017 HC ANESTHESIA TECHNICAL FEE, PER MIN: Performed by: OBSTETRICS & GYNECOLOGY

## 2023-11-25 PROCEDURE — 84460 ALANINE AMINO (ALT) (SGPT): CPT

## 2023-11-25 PROCEDURE — C9290 INJ, BUPIVACAINE LIPOSOME: HCPCS | Performed by: STUDENT IN AN ORGANIZED HEALTH CARE EDUCATION/TRAINING PROGRAM

## 2023-11-25 PROCEDURE — 250N000025 HC SEVOFLURANE, PER MIN: Performed by: OBSTETRICS & GYNECOLOGY

## 2023-11-25 PROCEDURE — 84450 TRANSFERASE (AST) (SGOT): CPT

## 2023-11-25 PROCEDURE — 360N000076 HC SURGERY LEVEL 3, PER MIN: Performed by: OBSTETRICS & GYNECOLOGY

## 2023-11-25 PROCEDURE — 258N000003 HC RX IP 258 OP 636: Performed by: STUDENT IN AN ORGANIZED HEALTH CARE EDUCATION/TRAINING PROGRAM

## 2023-11-25 PROCEDURE — 250N000013 HC RX MED GY IP 250 OP 250 PS 637

## 2023-11-25 PROCEDURE — 272N000001 HC OR GENERAL SUPPLY STERILE: Performed by: OBSTETRICS & GYNECOLOGY

## 2023-11-25 PROCEDURE — 88307 TISSUE EXAM BY PATHOLOGIST: CPT | Mod: 26 | Performed by: PATHOLOGY

## 2023-11-25 PROCEDURE — 250N000011 HC RX IP 250 OP 636: Mod: JZ | Performed by: ADVANCED PRACTICE MIDWIFE

## 2023-11-25 PROCEDURE — 85027 COMPLETE CBC AUTOMATED: CPT

## 2023-11-25 PROCEDURE — 250N000011 HC RX IP 250 OP 636: Mod: JZ | Performed by: STUDENT IN AN ORGANIZED HEALTH CARE EDUCATION/TRAINING PROGRAM

## 2023-11-25 PROCEDURE — 271N000001 HC OR GENERAL SUPPLY NON-STERILE: Performed by: OBSTETRICS & GYNECOLOGY

## 2023-11-25 PROCEDURE — 59510 CESAREAN DELIVERY: CPT | Mod: GC | Performed by: OBSTETRICS & GYNECOLOGY

## 2023-11-25 PROCEDURE — 82565 ASSAY OF CREATININE: CPT

## 2023-11-25 PROCEDURE — 120N000002 HC R&B MED SURG/OB UMMC

## 2023-11-25 PROCEDURE — 250N000009 HC RX 250: Performed by: STUDENT IN AN ORGANIZED HEALTH CARE EDUCATION/TRAINING PROGRAM

## 2023-11-25 PROCEDURE — 250N000011 HC RX IP 250 OP 636: Performed by: STUDENT IN AN ORGANIZED HEALTH CARE EDUCATION/TRAINING PROGRAM

## 2023-11-25 PROCEDURE — 710N000010 HC RECOVERY PHASE 1, LEVEL 2, PER MIN: Performed by: OBSTETRICS & GYNECOLOGY

## 2023-11-25 PROCEDURE — 36415 COLL VENOUS BLD VENIPUNCTURE: CPT

## 2023-11-25 PROCEDURE — 258N000003 HC RX IP 258 OP 636

## 2023-11-25 RX ORDER — NALOXONE HYDROCHLORIDE 1 MG/ML
0.4 INJECTION INTRAMUSCULAR; INTRAVENOUS; SUBCUTANEOUS
Status: DISCONTINUED | OUTPATIENT
Start: 2023-11-25 | End: 2023-11-29 | Stop reason: HOSPADM

## 2023-11-25 RX ORDER — OXYTOCIN 10 [USP'U]/ML
10 INJECTION, SOLUTION INTRAMUSCULAR; INTRAVENOUS
Status: DISCONTINUED | OUTPATIENT
Start: 2023-11-25 | End: 2023-11-25 | Stop reason: HOSPADM

## 2023-11-25 RX ORDER — HYDRALAZINE HYDROCHLORIDE 20 MG/ML
5-10 INJECTION INTRAMUSCULAR; INTRAVENOUS
Status: DISCONTINUED | OUTPATIENT
Start: 2023-11-25 | End: 2023-11-29 | Stop reason: HOSPADM

## 2023-11-25 RX ORDER — HYDROCORTISONE 25 MG/G
CREAM TOPICAL 3 TIMES DAILY PRN
Status: DISCONTINUED | OUTPATIENT
Start: 2023-11-25 | End: 2023-11-29 | Stop reason: HOSPADM

## 2023-11-25 RX ORDER — CEFAZOLIN SODIUM/WATER 2 G/20 ML
2 SYRINGE (ML) INTRAVENOUS
Status: COMPLETED | OUTPATIENT
Start: 2023-11-25 | End: 2023-11-25

## 2023-11-25 RX ORDER — LIDOCAINE 40 MG/G
CREAM TOPICAL
Status: DISCONTINUED | OUTPATIENT
Start: 2023-11-25 | End: 2023-11-25 | Stop reason: HOSPADM

## 2023-11-25 RX ORDER — ONDANSETRON 2 MG/ML
4 INJECTION INTRAMUSCULAR; INTRAVENOUS EVERY 30 MIN PRN
Status: DISCONTINUED | OUTPATIENT
Start: 2023-11-25 | End: 2023-11-25 | Stop reason: HOSPADM

## 2023-11-25 RX ORDER — SIMETHICONE 80 MG
80 TABLET,CHEWABLE ORAL 4 TIMES DAILY PRN
Status: DISCONTINUED | OUTPATIENT
Start: 2023-11-25 | End: 2023-11-29 | Stop reason: HOSPADM

## 2023-11-25 RX ORDER — METOCLOPRAMIDE HYDROCHLORIDE 5 MG/ML
10 INJECTION INTRAMUSCULAR; INTRAVENOUS EVERY 6 HOURS PRN
Status: DISCONTINUED | OUTPATIENT
Start: 2023-11-25 | End: 2023-11-29 | Stop reason: HOSPADM

## 2023-11-25 RX ORDER — DEXTROSE, SODIUM CHLORIDE, SODIUM LACTATE, POTASSIUM CHLORIDE, AND CALCIUM CHLORIDE 5; .6; .31; .03; .02 G/100ML; G/100ML; G/100ML; G/100ML; G/100ML
INJECTION, SOLUTION INTRAVENOUS CONTINUOUS
Status: DISCONTINUED | OUTPATIENT
Start: 2023-11-25 | End: 2023-11-29 | Stop reason: HOSPADM

## 2023-11-25 RX ORDER — BISACODYL 10 MG
10 SUPPOSITORY, RECTAL RECTAL DAILY PRN
Status: DISCONTINUED | OUTPATIENT
Start: 2023-11-27 | End: 2023-11-29 | Stop reason: HOSPADM

## 2023-11-25 RX ORDER — MISOPROSTOL 200 UG/1
800 TABLET ORAL
Status: DISCONTINUED | OUTPATIENT
Start: 2023-11-25 | End: 2023-11-25 | Stop reason: HOSPADM

## 2023-11-25 RX ORDER — NALOXONE HYDROCHLORIDE 1 MG/ML
0.2 INJECTION INTRAMUSCULAR; INTRAVENOUS; SUBCUTANEOUS
Status: DISCONTINUED | OUTPATIENT
Start: 2023-11-25 | End: 2023-11-29 | Stop reason: HOSPADM

## 2023-11-25 RX ORDER — OXYCODONE HYDROCHLORIDE 5 MG/1
5 TABLET ORAL EVERY 4 HOURS PRN
Status: DISCONTINUED | OUTPATIENT
Start: 2023-11-25 | End: 2023-11-25

## 2023-11-25 RX ORDER — NIFEDIPINE 30 MG/1
30 TABLET, EXTENDED RELEASE ORAL DAILY
Status: DISCONTINUED | OUTPATIENT
Start: 2023-11-25 | End: 2023-11-25

## 2023-11-25 RX ORDER — AMOXICILLIN 250 MG
2 CAPSULE ORAL 2 TIMES DAILY
Status: DISCONTINUED | OUTPATIENT
Start: 2023-11-25 | End: 2023-11-29 | Stop reason: HOSPADM

## 2023-11-25 RX ORDER — OXYTOCIN 10 [USP'U]/ML
10 INJECTION, SOLUTION INTRAMUSCULAR; INTRAVENOUS
Status: DISCONTINUED | OUTPATIENT
Start: 2023-11-25 | End: 2023-11-29 | Stop reason: HOSPADM

## 2023-11-25 RX ORDER — NIFEDIPINE 30 MG/1
90 TABLET, EXTENDED RELEASE ORAL DAILY
Status: DISCONTINUED | OUTPATIENT
Start: 2023-11-26 | End: 2023-11-29 | Stop reason: HOSPADM

## 2023-11-25 RX ORDER — LABETALOL HYDROCHLORIDE 5 MG/ML
20-40 INJECTION, SOLUTION INTRAVENOUS EVERY 10 MIN PRN
Status: DISCONTINUED | OUTPATIENT
Start: 2023-11-25 | End: 2023-11-29 | Stop reason: HOSPADM

## 2023-11-25 RX ORDER — OXYTOCIN/0.9 % SODIUM CHLORIDE 30/500 ML
340 PLASTIC BAG, INJECTION (ML) INTRAVENOUS CONTINUOUS PRN
Status: DISCONTINUED | OUTPATIENT
Start: 2023-11-25 | End: 2023-11-25 | Stop reason: HOSPADM

## 2023-11-25 RX ORDER — TRANEXAMIC ACID 10 MG/ML
1 INJECTION, SOLUTION INTRAVENOUS EVERY 30 MIN PRN
Status: DISCONTINUED | OUTPATIENT
Start: 2023-11-25 | End: 2023-11-29 | Stop reason: HOSPADM

## 2023-11-25 RX ORDER — BUPIVACAINE HYDROCHLORIDE 2.5 MG/ML
INJECTION, SOLUTION EPIDURAL; INFILTRATION; INTRACAUDAL
Status: DISCONTINUED | OUTPATIENT
Start: 2023-11-25 | End: 2023-11-25

## 2023-11-25 RX ORDER — CITRIC ACID/SODIUM CITRATE 334-500MG
30 SOLUTION, ORAL ORAL
Status: COMPLETED | OUTPATIENT
Start: 2023-11-25 | End: 2023-11-25

## 2023-11-25 RX ORDER — CARBOPROST TROMETHAMINE 250 UG/ML
250 INJECTION, SOLUTION INTRAMUSCULAR
Status: DISCONTINUED | OUTPATIENT
Start: 2023-11-25 | End: 2023-11-29 | Stop reason: HOSPADM

## 2023-11-25 RX ORDER — KETOROLAC TROMETHAMINE 30 MG/ML
30 INJECTION, SOLUTION INTRAMUSCULAR; INTRAVENOUS EVERY 6 HOURS
Status: COMPLETED | OUTPATIENT
Start: 2023-11-25 | End: 2023-11-25

## 2023-11-25 RX ORDER — LIDOCAINE 40 MG/G
CREAM TOPICAL
Status: DISCONTINUED | OUTPATIENT
Start: 2023-11-25 | End: 2023-11-29 | Stop reason: HOSPADM

## 2023-11-25 RX ORDER — SODIUM CHLORIDE, SODIUM LACTATE, POTASSIUM CHLORIDE, CALCIUM CHLORIDE 600; 310; 30; 20 MG/100ML; MG/100ML; MG/100ML; MG/100ML
10-125 INJECTION, SOLUTION INTRAVENOUS CONTINUOUS
Status: DISCONTINUED | OUTPATIENT
Start: 2023-11-25 | End: 2023-11-29 | Stop reason: HOSPADM

## 2023-11-25 RX ORDER — PROCHLORPERAZINE MALEATE 10 MG
10 TABLET ORAL EVERY 6 HOURS PRN
Status: DISCONTINUED | OUTPATIENT
Start: 2023-11-25 | End: 2023-11-29 | Stop reason: HOSPADM

## 2023-11-25 RX ORDER — ACETAMINOPHEN 325 MG/1
975 TABLET ORAL EVERY 6 HOURS
Status: DISCONTINUED | OUTPATIENT
Start: 2023-11-25 | End: 2023-11-29 | Stop reason: HOSPADM

## 2023-11-25 RX ORDER — MISOPROSTOL 200 UG/1
400 TABLET ORAL
Status: DISCONTINUED | OUTPATIENT
Start: 2023-11-25 | End: 2023-11-29 | Stop reason: HOSPADM

## 2023-11-25 RX ORDER — CALCIUM GLUCONATE 94 MG/ML
1 INJECTION, SOLUTION INTRAVENOUS
Status: DISCONTINUED | OUTPATIENT
Start: 2023-11-25 | End: 2023-11-29 | Stop reason: HOSPADM

## 2023-11-25 RX ORDER — SODIUM CHLORIDE, SODIUM LACTATE, POTASSIUM CHLORIDE, CALCIUM CHLORIDE 600; 310; 30; 20 MG/100ML; MG/100ML; MG/100ML; MG/100ML
INJECTION, SOLUTION INTRAVENOUS CONTINUOUS PRN
Status: DISCONTINUED | OUTPATIENT
Start: 2023-11-25 | End: 2023-11-25

## 2023-11-25 RX ORDER — PROCHLORPERAZINE 25 MG
25 SUPPOSITORY, RECTAL RECTAL EVERY 12 HOURS PRN
Status: DISCONTINUED | OUTPATIENT
Start: 2023-11-25 | End: 2023-11-29 | Stop reason: HOSPADM

## 2023-11-25 RX ORDER — OXYTOCIN/0.9 % SODIUM CHLORIDE 30/500 ML
100-340 PLASTIC BAG, INJECTION (ML) INTRAVENOUS CONTINUOUS PRN
Status: DISCONTINUED | OUTPATIENT
Start: 2023-11-25 | End: 2023-11-29 | Stop reason: HOSPADM

## 2023-11-25 RX ORDER — NIFEDIPINE 30 MG/1
30 TABLET, EXTENDED RELEASE ORAL ONCE
Status: COMPLETED | OUTPATIENT
Start: 2023-11-25 | End: 2023-11-25

## 2023-11-25 RX ORDER — METHYLERGONOVINE MALEATE 0.2 MG/ML
200 INJECTION INTRAVENOUS
Status: DISCONTINUED | OUTPATIENT
Start: 2023-11-25 | End: 2023-11-29 | Stop reason: HOSPADM

## 2023-11-25 RX ORDER — MAGNESIUM SULFATE IN WATER 40 MG/ML
2 INJECTION, SOLUTION INTRAVENOUS CONTINUOUS
Status: DISPENSED | OUTPATIENT
Start: 2023-11-25 | End: 2023-11-26

## 2023-11-25 RX ORDER — METOCLOPRAMIDE 10 MG/1
10 TABLET ORAL EVERY 6 HOURS PRN
Status: DISCONTINUED | OUTPATIENT
Start: 2023-11-25 | End: 2023-11-29 | Stop reason: HOSPADM

## 2023-11-25 RX ORDER — ONDANSETRON 4 MG/1
4 TABLET, ORALLY DISINTEGRATING ORAL EVERY 6 HOURS PRN
Status: DISCONTINUED | OUTPATIENT
Start: 2023-11-25 | End: 2023-11-29 | Stop reason: HOSPADM

## 2023-11-25 RX ORDER — PROPOFOL 10 MG/ML
INJECTION, EMULSION INTRAVENOUS CONTINUOUS PRN
Status: DISCONTINUED | OUTPATIENT
Start: 2023-11-25 | End: 2023-11-25

## 2023-11-25 RX ORDER — CEFAZOLIN SODIUM/WATER 2 G/20 ML
2 SYRINGE (ML) INTRAVENOUS SEE ADMIN INSTRUCTIONS
Status: DISCONTINUED | OUTPATIENT
Start: 2023-11-25 | End: 2023-11-25 | Stop reason: HOSPADM

## 2023-11-25 RX ORDER — FENTANYL CITRATE 50 UG/ML
25 INJECTION, SOLUTION INTRAMUSCULAR; INTRAVENOUS EVERY 5 MIN PRN
Status: DISCONTINUED | OUTPATIENT
Start: 2023-11-25 | End: 2023-11-25 | Stop reason: HOSPADM

## 2023-11-25 RX ORDER — SODIUM CHLORIDE, SODIUM LACTATE, POTASSIUM CHLORIDE, CALCIUM CHLORIDE 600; 310; 30; 20 MG/100ML; MG/100ML; MG/100ML; MG/100ML
INJECTION, SOLUTION INTRAVENOUS CONTINUOUS
Status: DISCONTINUED | OUTPATIENT
Start: 2023-11-25 | End: 2023-11-25 | Stop reason: HOSPADM

## 2023-11-25 RX ORDER — MODIFIED LANOLIN
OINTMENT (GRAM) TOPICAL
Status: DISCONTINUED | OUTPATIENT
Start: 2023-11-25 | End: 2023-11-29 | Stop reason: HOSPADM

## 2023-11-25 RX ORDER — METRONIDAZOLE 500 MG/1
500 TABLET ORAL 2 TIMES DAILY
Status: DISCONTINUED | OUTPATIENT
Start: 2023-11-25 | End: 2023-11-27

## 2023-11-25 RX ORDER — METHYLERGONOVINE MALEATE 0.2 MG/ML
200 INJECTION INTRAVENOUS
Status: DISCONTINUED | OUTPATIENT
Start: 2023-11-25 | End: 2023-11-25 | Stop reason: HOSPADM

## 2023-11-25 RX ORDER — MISOPROSTOL 200 UG/1
800 TABLET ORAL
Status: DISCONTINUED | OUTPATIENT
Start: 2023-11-25 | End: 2023-11-29 | Stop reason: HOSPADM

## 2023-11-25 RX ORDER — ONDANSETRON 2 MG/ML
4 INJECTION INTRAMUSCULAR; INTRAVENOUS EVERY 6 HOURS PRN
Status: DISCONTINUED | OUTPATIENT
Start: 2023-11-25 | End: 2023-11-29 | Stop reason: HOSPADM

## 2023-11-25 RX ORDER — SERTRALINE HYDROCHLORIDE 25 MG/1
25 TABLET, FILM COATED ORAL DAILY
Status: DISCONTINUED | OUTPATIENT
Start: 2023-11-25 | End: 2023-11-29 | Stop reason: HOSPADM

## 2023-11-25 RX ORDER — OXYTOCIN/0.9 % SODIUM CHLORIDE 30/500 ML
340 PLASTIC BAG, INJECTION (ML) INTRAVENOUS CONTINUOUS PRN
Status: DISCONTINUED | OUTPATIENT
Start: 2023-11-25 | End: 2023-11-29 | Stop reason: HOSPADM

## 2023-11-25 RX ORDER — MAGNESIUM SULFATE HEPTAHYDRATE 40 MG/ML
2 INJECTION, SOLUTION INTRAVENOUS
Status: DISCONTINUED | OUTPATIENT
Start: 2023-11-25 | End: 2023-11-29 | Stop reason: HOSPADM

## 2023-11-25 RX ORDER — IBUPROFEN 800 MG/1
800 TABLET, FILM COATED ORAL EVERY 6 HOURS
Status: DISCONTINUED | OUTPATIENT
Start: 2023-11-26 | End: 2023-11-29 | Stop reason: HOSPADM

## 2023-11-25 RX ORDER — MAGNESIUM SULFATE HEPTAHYDRATE 40 MG/ML
4 INJECTION, SOLUTION INTRAVENOUS
Status: DISCONTINUED | OUTPATIENT
Start: 2023-11-25 | End: 2023-11-29 | Stop reason: HOSPADM

## 2023-11-25 RX ORDER — OXYTOCIN/0.9 % SODIUM CHLORIDE 30/500 ML
PLASTIC BAG, INJECTION (ML) INTRAVENOUS CONTINUOUS PRN
Status: DISCONTINUED | OUTPATIENT
Start: 2023-11-25 | End: 2023-11-25

## 2023-11-25 RX ORDER — LABETALOL HYDROCHLORIDE 5 MG/ML
20-80 INJECTION, SOLUTION INTRAVENOUS EVERY 10 MIN PRN
Status: DISCONTINUED | OUTPATIENT
Start: 2023-11-25 | End: 2023-11-25

## 2023-11-25 RX ORDER — ONDANSETRON 2 MG/ML
INJECTION INTRAMUSCULAR; INTRAVENOUS PRN
Status: DISCONTINUED | OUTPATIENT
Start: 2023-11-25 | End: 2023-11-25

## 2023-11-25 RX ORDER — ACETAMINOPHEN 325 MG/1
975 TABLET ORAL ONCE
Status: COMPLETED | OUTPATIENT
Start: 2023-11-25 | End: 2023-11-25

## 2023-11-25 RX ORDER — LORAZEPAM 2 MG/ML
2 INJECTION INTRAMUSCULAR ONCE
Status: DISCONTINUED | OUTPATIENT
Start: 2023-11-25 | End: 2023-11-25

## 2023-11-25 RX ORDER — SODIUM CHLORIDE, SODIUM LACTATE, POTASSIUM CHLORIDE, CALCIUM CHLORIDE 600; 310; 30; 20 MG/100ML; MG/100ML; MG/100ML; MG/100ML
10-125 INJECTION, SOLUTION INTRAVENOUS CONTINUOUS
Status: DISCONTINUED | OUTPATIENT
Start: 2023-11-25 | End: 2023-11-25

## 2023-11-25 RX ORDER — AZITHROMYCIN 500 MG/5ML
500 INJECTION, POWDER, LYOPHILIZED, FOR SOLUTION INTRAVENOUS
Status: COMPLETED | OUTPATIENT
Start: 2023-11-25 | End: 2023-11-25

## 2023-11-25 RX ORDER — NIFEDIPINE 30 MG/1
60 TABLET, EXTENDED RELEASE ORAL DAILY
Status: DISCONTINUED | OUTPATIENT
Start: 2023-11-26 | End: 2023-11-25

## 2023-11-25 RX ORDER — ENOXAPARIN SODIUM 100 MG/ML
40 INJECTION SUBCUTANEOUS EVERY 24 HOURS
Status: DISCONTINUED | OUTPATIENT
Start: 2023-11-26 | End: 2023-11-29 | Stop reason: HOSPADM

## 2023-11-25 RX ORDER — LIDOCAINE HYDROCHLORIDE 20 MG/ML
INJECTION, SOLUTION INFILTRATION; PERINEURAL PRN
Status: DISCONTINUED | OUTPATIENT
Start: 2023-11-25 | End: 2023-11-25

## 2023-11-25 RX ORDER — AMOXICILLIN 250 MG
1 CAPSULE ORAL 2 TIMES DAILY
Status: DISCONTINUED | OUTPATIENT
Start: 2023-11-25 | End: 2023-11-29 | Stop reason: HOSPADM

## 2023-11-25 RX ORDER — LIDOCAINE HCL/EPINEPHRINE/PF 2%-1:200K
VIAL (ML) INJECTION PRN
Status: DISCONTINUED | OUTPATIENT
Start: 2023-11-25 | End: 2023-11-25

## 2023-11-25 RX ORDER — MAGNESIUM SULFATE HEPTAHYDRATE 40 MG/ML
4 INJECTION, SOLUTION INTRAVENOUS ONCE
Status: COMPLETED | OUTPATIENT
Start: 2023-11-25 | End: 2023-11-25

## 2023-11-25 RX ORDER — LORAZEPAM 2 MG/ML
INJECTION INTRAMUSCULAR
Status: DISCONTINUED
Start: 2023-11-25 | End: 2023-11-25 | Stop reason: WASHOUT

## 2023-11-25 RX ORDER — SODIUM CHLORIDE, SODIUM LACTATE, POTASSIUM CHLORIDE, CALCIUM CHLORIDE 600; 310; 30; 20 MG/100ML; MG/100ML; MG/100ML; MG/100ML
INJECTION, SOLUTION INTRAVENOUS
Status: COMPLETED
Start: 2023-11-25 | End: 2023-11-25

## 2023-11-25 RX ORDER — ONDANSETRON 4 MG/1
4 TABLET, ORALLY DISINTEGRATING ORAL EVERY 30 MIN PRN
Status: DISCONTINUED | OUTPATIENT
Start: 2023-11-25 | End: 2023-11-25 | Stop reason: HOSPADM

## 2023-11-25 RX ORDER — CARBOPROST TROMETHAMINE 250 UG/ML
250 INJECTION, SOLUTION INTRAMUSCULAR
Status: DISCONTINUED | OUTPATIENT
Start: 2023-11-25 | End: 2023-11-25 | Stop reason: HOSPADM

## 2023-11-25 RX ORDER — PROPOFOL 10 MG/ML
INJECTION, EMULSION INTRAVENOUS PRN
Status: DISCONTINUED | OUTPATIENT
Start: 2023-11-25 | End: 2023-11-25

## 2023-11-25 RX ORDER — MISOPROSTOL 200 UG/1
400 TABLET ORAL
Status: DISCONTINUED | OUTPATIENT
Start: 2023-11-25 | End: 2023-11-25 | Stop reason: HOSPADM

## 2023-11-25 RX ORDER — TRANEXAMIC ACID 10 MG/ML
1 INJECTION, SOLUTION INTRAVENOUS EVERY 30 MIN PRN
Status: DISCONTINUED | OUTPATIENT
Start: 2023-11-25 | End: 2023-11-25 | Stop reason: HOSPADM

## 2023-11-25 RX ADMIN — LIDOCAINE HYDROCHLORIDE,EPINEPHRINE BITARTRATE 5 ML: 20; .005 INJECTION, SOLUTION EPIDURAL; INFILTRATION; INTRACAUDAL; PERINEURAL at 02:44

## 2023-11-25 RX ADMIN — PHENYLEPHRINE HYDROCHLORIDE 50 MCG/MIN: 10 INJECTION INTRAVENOUS at 03:09

## 2023-11-25 RX ADMIN — Medication 500 MG: at 03:04

## 2023-11-25 RX ADMIN — NIFEDIPINE 30 MG: 30 TABLET, FILM COATED, EXTENDED RELEASE ORAL at 10:49

## 2023-11-25 RX ADMIN — KETOROLAC TROMETHAMINE 30 MG: 30 INJECTION, SOLUTION INTRAMUSCULAR; INTRAVENOUS at 22:03

## 2023-11-25 RX ADMIN — SODIUM CHLORIDE, POTASSIUM CHLORIDE, SODIUM LACTATE AND CALCIUM CHLORIDE: 600; 310; 30; 20 INJECTION, SOLUTION INTRAVENOUS at 02:33

## 2023-11-25 RX ADMIN — Medication 2.5 MG: at 19:41

## 2023-11-25 RX ADMIN — SENNOSIDES AND DOCUSATE SODIUM 2 TABLET: 50; 8.6 TABLET ORAL at 10:49

## 2023-11-25 RX ADMIN — ACETAMINOPHEN 975 MG: 325 TABLET, FILM COATED ORAL at 14:05

## 2023-11-25 RX ADMIN — SENNOSIDES AND DOCUSATE SODIUM 2 TABLET: 50; 8.6 TABLET ORAL at 22:03

## 2023-11-25 RX ADMIN — Medication 600 ML/HR: at 02:59

## 2023-11-25 RX ADMIN — KETOROLAC TROMETHAMINE 30 MG: 30 INJECTION, SOLUTION INTRAMUSCULAR; INTRAVENOUS at 10:49

## 2023-11-25 RX ADMIN — BUPIVACAINE HYDROCHLORIDE 20 ML: 2.5 INJECTION, SOLUTION EPIDURAL; INFILTRATION; INTRACAUDAL at 04:50

## 2023-11-25 RX ADMIN — OXYCODONE HYDROCHLORIDE 5 MG: 5 TABLET ORAL at 09:27

## 2023-11-25 RX ADMIN — BUPIVACAINE 20 ML: 13.3 INJECTION, SUSPENSION, LIPOSOMAL INFILTRATION at 04:50

## 2023-11-25 RX ADMIN — ACETAMINOPHEN 975 MG: 325 TABLET, FILM COATED ORAL at 02:17

## 2023-11-25 RX ADMIN — KETOROLAC TROMETHAMINE 30 MG: 30 INJECTION, SOLUTION INTRAMUSCULAR; INTRAVENOUS at 16:07

## 2023-11-25 RX ADMIN — TRANEXAMIC ACID 1 G: 10 INJECTION, SOLUTION INTRAVENOUS at 03:36

## 2023-11-25 RX ADMIN — KETOROLAC TROMETHAMINE 30 MG: 30 INJECTION, SOLUTION INTRAMUSCULAR at 04:58

## 2023-11-25 RX ADMIN — MAGNESIUM SULFATE HEPTAHYDRATE 4 G: 40 INJECTION, SOLUTION INTRAVENOUS at 05:30

## 2023-11-25 RX ADMIN — Medication 2.5 MG: at 14:05

## 2023-11-25 RX ADMIN — MIDAZOLAM 2 MG: 1 INJECTION INTRAMUSCULAR; INTRAVENOUS at 03:00

## 2023-11-25 RX ADMIN — PROPOFOL 150 MCG/KG/MIN: 10 INJECTION, EMULSION INTRAVENOUS at 03:10

## 2023-11-25 RX ADMIN — SERTRALINE HYDROCHLORIDE 25 MG: 25 TABLET ORAL at 10:49

## 2023-11-25 RX ADMIN — PHENYLEPHRINE HYDROCHLORIDE 100 MCG: 10 INJECTION INTRAVENOUS at 02:52

## 2023-11-25 RX ADMIN — NIFEDIPINE 30 MG: 30 TABLET, FILM COATED, EXTENDED RELEASE ORAL at 07:02

## 2023-11-25 RX ADMIN — LIDOCAINE HYDROCHLORIDE 100 MG: 20 INJECTION, SOLUTION INFILTRATION; PERINEURAL at 02:52

## 2023-11-25 RX ADMIN — LIDOCAINE HYDROCHLORIDE,EPINEPHRINE BITARTRATE 5 ML: 20; .005 INJECTION, SOLUTION EPIDURAL; INFILTRATION; INTRACAUDAL; PERINEURAL at 02:04

## 2023-11-25 RX ADMIN — ACETAMINOPHEN 975 MG: 325 TABLET, FILM COATED ORAL at 19:41

## 2023-11-25 RX ADMIN — SODIUM CHLORIDE, SODIUM LACTATE, POTASSIUM CHLORIDE, CALCIUM CHLORIDE 75 ML/HR: 600; 310; 30; 20 INJECTION, SOLUTION INTRAVENOUS at 22:53

## 2023-11-25 RX ADMIN — ONDANSETRON 4 MG: 2 INJECTION INTRAMUSCULAR; INTRAVENOUS at 02:38

## 2023-11-25 RX ADMIN — Medication 2 G: at 02:36

## 2023-11-25 RX ADMIN — SODIUM CHLORIDE, POTASSIUM CHLORIDE, SODIUM LACTATE AND CALCIUM CHLORIDE 75 ML/HR: 600; 310; 30; 20 INJECTION, SOLUTION INTRAVENOUS at 22:53

## 2023-11-25 RX ADMIN — MAGNESIUM SULFATE HEPTAHYDRATE 2 G/HR: 40 INJECTION, SOLUTION INTRAVENOUS at 06:01

## 2023-11-25 RX ADMIN — LIDOCAINE HYDROCHLORIDE,EPINEPHRINE BITARTRATE 5 ML: 20; .005 INJECTION, SOLUTION EPIDURAL; INFILTRATION; INTRACAUDAL; PERINEURAL at 02:33

## 2023-11-25 RX ADMIN — LIDOCAINE HYDROCHLORIDE,EPINEPHRINE BITARTRATE 5 ML: 20; .005 INJECTION, SOLUTION EPIDURAL; INFILTRATION; INTRACAUDAL; PERINEURAL at 02:37

## 2023-11-25 RX ADMIN — SODIUM CITRATE AND CITRIC ACID MONOHYDRATE 30 ML: 500; 334 SOLUTION ORAL at 02:17

## 2023-11-25 RX ADMIN — NIFEDIPINE 30 MG: 30 TABLET, FILM COATED, EXTENDED RELEASE ORAL at 14:41

## 2023-11-25 RX ADMIN — LIDOCAINE HYDROCHLORIDE,EPINEPHRINE BITARTRATE 5 ML: 20; .005 INJECTION, SOLUTION EPIDURAL; INFILTRATION; INTRACAUDAL; PERINEURAL at 02:49

## 2023-11-25 RX ADMIN — ACETAMINOPHEN 975 MG: 325 TABLET, FILM COATED ORAL at 07:17

## 2023-11-25 RX ADMIN — SIMETHICONE 80 MG: 80 TABLET, CHEWABLE ORAL at 09:27

## 2023-11-25 RX ADMIN — PROPOFOL 200 MG: 10 INJECTION, EMULSION INTRAVENOUS at 02:52

## 2023-11-25 RX ADMIN — SUCCINYLCHOLINE CHLORIDE 150 MG: 20 INJECTION, SOLUTION INTRAMUSCULAR; INTRAVENOUS; PARENTERAL at 02:52

## 2023-11-25 ASSESSMENT — ACTIVITIES OF DAILY LIVING (ADL)
ADLS_ACUITY_SCORE: 19
ADLS_ACUITY_SCORE: 19
ADLS_ACUITY_SCORE: 18
ADLS_ACUITY_SCORE: 19
ADLS_ACUITY_SCORE: 18
ADLS_ACUITY_SCORE: 20
ADLS_ACUITY_SCORE: 19
ADLS_ACUITY_SCORE: 18
ADLS_ACUITY_SCORE: 19
ADLS_ACUITY_SCORE: 20
ADLS_ACUITY_SCORE: 19
ADLS_ACUITY_SCORE: 19

## 2023-11-25 NOTE — PLAN OF CARE
Data:VSS, no change in moderate to strong abdominal cramping after pain medications.   Action:  Patient and support persons educated on labor process. Patient instructed to report change in fetal movement, contractions, vaginal leaking of fluid or bleeding, abdominal pain, or any concerns related to the pregnancy to her nurse/physician.   Response:  Patient verbalized understanding of education and verbalized agreement with plan. Patient coping with labor via breathing hot packs and pain medication.      Goal Outcome Evaluation:      Plan of Care Reviewed With: patient, spouse    Overall Patient Progress: improving

## 2023-11-25 NOTE — PROVIDER NOTIFICATION
23 1630   Provider Notification   Provider Name/Title Yolanda Gonsales CNM   Method of Notification At Bedside   Request Evaluate in Person   Notification Reason Patient Arrived;Uterine Activity     Data: [id number]   Reason for maternal/fetal assessment per patient is Rule Out Labor  .  Patient is a . Prenatal record reviewed.      OB History    Para Term  AB Living   2 0 0 0 1 0   SAB IAB Ectopic Multiple Live Births   1 0 0 0 0      # Outcome Date GA Lbr Dontrell/2nd Weight Sex Delivery Anes PTL Lv   2 Current            1 SAB      SAB      . Medical history:   Past Medical History:   Diagnosis Date    Anxiety     BV (bacterial vaginosis) 2023    Varicella    . Gestational Age 38w4d. Had elevated BP on arrival 160/94, then WNL after 15 min., other VSS. Fetal movement present. Patient denies backache, pelvic pressure, UTI symptoms, GI problems, edema, headache, visual disturbances, epigastric or URQ pain, rupture of membranes. Support person Judson present.Pt reports lower uterine cramping every 3-7 minutes since 1230 today, and had scant pink and then dark red spotting with wipes after voiding.  Action: Verbal consent for EFM. Triage assessment completed. EFM applied for Fetal assessment. Uterine assessment on toco shows uc's q2-4 min. Fetal assessment: Presumed adequate fetal oxygenation documented (see flow record).   Response: ROXANNA Hunter informed of patient arrival and above information and is at bedside. SVE performed by ROXANNA: closed, posterior. Plan per provider is to continue to monitor for now in triage. Patient verbalized agreement with plan. Patient oriented to room and call light.

## 2023-11-25 NOTE — ANESTHESIA PREPROCEDURE EVALUATION
"Anesthesia Pre-Procedure Evaluation    Patient: Kelly Wakefield   MRN: [id number] : 1985        Procedure :           Past Medical History:   Diagnosis Date    Anxiety     BV (bacterial vaginosis) 2023    Varicella       Past Surgical History:   Procedure Laterality Date    APPENDECTOMY      COLONOSCOPY  2009    COLPOSCOPY      DILATION AND CURETTAGE      FOOT SURGERY Right 2015      No Known Allergies   Social History     Tobacco Use    Smoking status: Former     Types: Cigarettes     Quit date: 2/10/2020     Years since quitting: 3.7    Smokeless tobacco: Never   Substance Use Topics    Alcohol use: Not Currently      Wt Readings from Last 1 Encounters:   23 97.3 kg (214 lb 8 oz)        Anesthesia Evaluation            ROS/MED HX  ENT/Pulmonary:       Neurologic:       Cardiovascular:       METS/Exercise Tolerance:     Hematologic:       Musculoskeletal:       GI/Hepatic:       Renal/Genitourinary:       Endo:       Psychiatric/Substance Use:     (+) psychiatric history anxiety       Infectious Disease: Comment: COVID19 +      Malignancy:       Other: Comment:  at 38w4d           Physical Exam    Airway  airway exam normal           Respiratory Devices and Support         Dental       (+) Minor Abnormalities - some fillings, tiny chips      Cardiovascular   cardiovascular exam normal          Pulmonary   pulmonary exam normal                OUTSIDE LABS:  CBC:   Lab Results   Component Value Date    WBC 11.6 (H) 2023    WBC 9.0 10/27/2023    HGB 13.6 2023    HGB 12.0 10/27/2023    HCT 38.8 2023    HCT 35.0 10/27/2023     2023     10/27/2023     BMP:   Lab Results   Component Value Date     2023    POTASSIUM 3.9 2023    CHLORIDE 102 2023    CO2 21 (L) 2023    BUN 8.7 2023    CR 0.76 2023    GLC 99 2023     COAGS: No results found for: \"PTT\", \"INR\", \"FIBR\"  POC: No results found for: \"BGM\", \"HCG\", " "\"HCGS\"  HEPATIC: No results found for: \"ALBUMIN\", \"PROTTOTAL\", \"ALT\", \"AST\", \"GGT\", \"ALKPHOS\", \"BILITOTAL\", \"BILIDIRECT\", \"HEMALATHA\"  OTHER:   Lab Results   Component Value Date    MARY 9.6 04/17/2023    TSH 0.98 05/09/2023       Anesthesia Plan    ASA Status:  2       Anesthesia Type: Epidural.              Consents            Postoperative Care            Comments:                Guillermo Michaels MD  "

## 2023-11-25 NOTE — PROGRESS NOTES
"Magnesium Check    S: Patient found resting in bed. Endorses she is doing well. Denies headache, blurry vision, scotomas, shortness of breath, chest pain, or epigastric pain. Endorses RLQ discomfort that radiates to her back.     O:  BP (!) 157/99   Pulse 86   Temp 98.6  F (37  C) (Axillary)   Resp 16   Ht 1.651 m (5' 5\")   Wt 97.3 kg (214 lb 8 oz)   LMP 2023   SpO2 98%   Breastfeeding Unknown   BMI 35.69 kg/m      General: AAOx3, NAD  CV: RRR, no murmurs, rubs, or gallops  Resp: CTAB, no wheezes, rales, or rhonchi  Ext: Bilateral patellar and brachioradialis reflexes 2+, no clonus; trace edema on bilateral LE, SCDs in place    UOP: 450 over last 4 hours per R; 1.16ml/kg/hour over past 4 hours      Labs:  - HELLP labs wnl at 0630    A/P: 38 year old  POD#0 s/p PLTCS at 38w5d  for Cat II FHT and arrest of descent on magnesium for seizure prophylaxis in the setting of pre-eclampsia with severe features by blood pressure. Currently doing okay with no signs of toxicity. BPs remain elevated after one dose of 30mg Nifedipine at 0700. Will add an additional 30mg Nifedipine now for a daily total of 60mg.      # Pre-eclampsia with severe features  - Mag started at 0530; currently running @ 2g/hr               -d/c mag on  at 0530  -HELLP labs wnl at 0630  -IV hypertensives prn for sustained severe range BPs   -UOP adequate at 1.16ml/kg/hour  -Continue q4hr Mag checks; next at 1730  - Daily weights + strict I/Os    Marian Cleveland MD  Obstetrics and Gynecology, PGY-1  23 2:45 PM     "

## 2023-11-25 NOTE — PROGRESS NOTES
"Magnesium Check    S: Patient found resting in bed. Endorses she is doing well. Denies headache, blurry vision, scotomas, shortness of breath, chest pain, RUQ or epigastric pain. States prior endorsed post-operative pain is overall more well controlled.     O:  /78 (BP Location: Left arm, Patient Position: Semi-Cabrera's, Cuff Size: Adult Regular)   Pulse 87   Temp 98.6  F (37  C) (Axillary)   Resp 16   Ht 1.651 m (5' 5\")   Wt 97.3 kg (214 lb 8 oz)   LMP 2023   SpO2 98%   Breastfeeding Unknown   BMI 35.69 kg/m      General: AAOx3, NAD  CV: RRR, no murmurs, rubs, or gallops  Resp: CTAB, no wheezes, rales, or rhonchi  Ext: Bilateral patellar and brachioradialis reflexes 2+, no clonus; trace edema on bilateral LE, SCDs in place    UOP: 1100 ml out since 1400; 3.77ml/kg/hour    Labs:  - HELLP labs wnl at 0630    A/P: 38 year old  POD#0 s/p PLTCS at 38w5d  for Cat II FHT and arrest of descent on magnesium for seizure prophylaxis in the setting of pre-eclampsia with severe features by blood pressure. Currently doing okay with no signs of toxicity. Had 1 non-sustained severe range pressure since last magnesium check. BP currently normotensive. Has received 90mg total of nifedipine today.     # Pre-eclampsia with severe features  - Mag started at 0530; currently running @ 2g/hr               -d/c mag on  at 0530  -HELLP labs wnl at 0630; repeat ordered for  at 0630  -IV hypertensives prn for sustained severe range BPs   -UOP adequate at 3.77ml/kg/hour  -Continue q4hr Mag checks; next at 2130  - Daily weights + strict I/Os    Marian Cleveland MD  Obstetrics and Gynecology, PGY-1  23 2:45 PM     "

## 2023-11-25 NOTE — ANESTHESIA PROCEDURE NOTES
"TAP Procedure Note    Pre-Procedure   Staff -        Anesthesiologist:  Veronica Diane MD       Resident/Fellow: Guillermo Michaels MD       Performed By: with residents       Procedure performed by resident/fellow/CRNA in presence of a teaching physician.         Location: OR       Procedure Start/Stop Times: 11/25/2023 4:40 AM and 11/25/2023 5:00 AM       Pre-Anesthestic Checklist: patient identified, IV checked, site marked, risks and benefits discussed, informed consent, monitors and equipment checked, pre-op evaluation, at physician/surgeon's request and post-op pain management  Timeout:       Correct Patient: Yes        Correct Procedure: Yes        Correct Site: Yes        Correct Position: Yes        Correct Laterality: Yes        Site Marked: Yes  Procedure Documentation  Procedure: TAP       Laterality: bilateral       Patient Position: supine       Skin prep: Chloraprep       Insertion Site: T9-10.       Needle Gauge: 21.        Needle Length (millimeters): 110        Ultrasound guided       1. Ultrasound was used to identify targeted nerve, plexus, vascular marker, or fascial plane and place a needle adjacent to it in real-time.       2. Ultrasound was used to visualize the spread of anesthetic in close proximity to the above referenced structure.       3. A permanent image is entered into the patient's record.       4. The visualized anatomic structures appeared normal.       5. There were no apparent abnormal pathologic findings.  Medication(s) Administered   Bupivacaine 0.25% PF (Infiltration) - Infiltration   20 mL - 11/25/2023 4:50:00 AM  Bupivacaine liposome (Exparel) 1.3% LA inj susp (Infiltration) - Infiltration   20 mL - 11/25/2023 4:50:00 AM  Medication Administration Time: 11/25/2023 4:40 AM      FOR [facility] ([facility]) ONLY:   Pain Team Contact information: please page the Pain Team Via [employer]. Search \"Pain\". During daytime hours, please page the attending first. At night please page the "  first.

## 2023-11-25 NOTE — PROGRESS NOTES
"  S: Pushing with good effort.     O:  Blood pressure (!) 191/97, pulse 65, temperature 97.7  F (36.5  C), temperature source Oral, resp. rate 18, height 1.651 m (5' 5\"), weight 97.3 kg (214 lb 8 oz), last menstrual period 02/27/2023, SpO2 97%, not currently breastfeeding.  General appearance: uncomfortable with contractions    CONTRACTIONS: Contractions every 1.5-4 minutes.  Palpate: strong  FETAL HEART TONES: baseline 175 with moderate FHR variability and    accelerations no. Decelerations yes.    ROM: moderate meconium fluid  PELVIC EXAM:PELVIC EXAM: 10/ 100%/  +2  with pushes to+3  Fetal Position:  cephalic  Bloody show: Yes   Pitocin- 2 mu/min.,  Antibiotics- none  Cervical ripening: N/A  ASSESSMENT:  ==============  IUP @ 38w5d second stage labor, with slow progress  Fetal Heart rate tracing Category category two  GBS- negative  Covid infection     PLAN:  ===========    MD consultant on call Dr Peña consulted regarding persistent category 2 FHTs with very slow progress. Intermittent non sustained severe ranged BP/  Recommend expedited delivery in context of fetal tachycardia and Cat 2 FHTs.     Yolanda Gonsales, ROXANNA, APRN JANELM, CNM    "

## 2023-11-25 NOTE — PROVIDER NOTIFICATION
11/25/23 0849   Provider Notification   Provider Name/Title G1 Dr Cleveland   Method of Notification Electronic Page   Request Evaluate-Remote   Notification Reason Other       Pt is on capnography but there are no orders. Can you discuss w anesthesia and have the capno orders placed, when to d/c etc. Thank you.

## 2023-11-25 NOTE — PROVIDER NOTIFICATION
11/24/23 1822   Provider Notification   Provider Name/Title ROXANNA Hunter   Method of Notification At Bedside   Request Evaluate in Person   Notification Reason Membrane Status;Labor Status;Lab/Diagnostic Study     MD at bedside and notified that patient leaking small amount of light meconium stained fluid, and bloody show present. BSUS performed by ROXANNA, Migue. Patient continues to report strong lower uterine cramping, every 2-3 minutes, palpating as moderate. Giving oral Hydroxyzine and IM Morphine, RN plans to remain at bedside for now.

## 2023-11-25 NOTE — OP NOTE
[facility]  Operative Note     Kelly Wakefield  1985      Surgery Date:  2023  Surgeon:  Bree Peña MD  Assistants:  Gila Osei MD PGY-2     Pre-op Diagnosis:    - Intrauterine pregnancy at 38w5d  - Cat II FHT   - Arrest of descent   - Preeclampsia with severe features        Post-op Diagnosis:    - Same   - Thick meconium   - Liveborn male infant   - Postpartum Hemorrhage     Procedure:    - Primary low-transverse  section with single layer uterine closure via Pfannenstiel incision    Anesthesia:  Failed epidural > GETA   QBL:    1190 mL  IVF:    800 mL crystalloid  UOP:    175 mL blood tinged urine since prior to procedure  Drains:   Huddleston Catheter   Specimens:   Routine cord blood/segment, placenta  Antibiotics:  2g Ancef, 500mg Azithromycin  Additional medications: 1g TXA IV  Complications: Postpartum hemorrhage, bilateral extensions of the hysterotomy    Indications:   Kelly Wakefield is a 38 year old  at 38w5d admitted to the [facility] service with spontaneous latent labor. Her labor progressed rapidly spontaneously and she began pushing. With pushing, she developed a Cat II FHT with fetal tachycardia, minimal variability, and recurrent variable decelerations. She pushed for close to 3 hours with minimal descent. The OB team was consulted after 3 hours of second stage. Given Cat II FHT with minimal labor progress, an urgent  section was recommended. The risks, benefits, and alternatives of  section were discussed with the patient, and she agreed to proceed.     Findings:   No subcutaneous scarring, rectofascial adhesions, or intraabdominal adhesions  Thick pasty meconium encountered upon making the uterine incision  Liveborn male infant in vertex, BOLIVAR presentation. Born at 0259 on 23. Apgars not yet charted and weight 6 lb 15 oz.  Cord gases: pH Arterial 7.08/ Base excess 12.1/ pH venous 7.15/ Base excess 9.4  Normal uterus,  fallopian tubes, and ovaries. Bilateral extensions of the hysterotomy into the broad ligament requiring L [place]. Hematoma present at the superior edge of the hysterotomy- not expanding. SurgiFoam placed near bilateral corners of hysterotomy.     Procedure Details:   The patient was brought to the OR, where epidural anesthesia was re-bolused.  She was placed in the dorsal lithotomy position with legs in yellowfin stirrups and a slight leftward tilt. She was prepped and draped in the usual sterile fashion. A surgical time out was performed. Due to suboptimal pain control,  decision to proceed with GETA was made. A pfannenstiel skin incision was made with the scalpel, and carried down to the underlying fascia with sharp and blunt dissection. The fascia was incised in the midline, and the incision was extended laterally with the blunt dissection. The rectus muscles were  in the midline, and the peritoneum was entered bluntly, and the opening was extended with digital pressure. The bladder blade was placed. A transverse hysterotomy was made with the scalpel in the lower uterine segment, and the incision was extended with digital pressure. The infant was noted to be in the vertex BOLIVAR position, and was delivered atraumatically. The shoulders delivered easily. No nuchal cord was noted. The cord was doubly clamped and cut immediately and the infant was handed off to the awaiting NICU staff. A segment of cord was cut and sent to lab. Cord gasses were collected. The placenta was manually extracted. The uterus was exteriorized and cleared of all clots and debris. Uterine tone was noted to be boggy and high rate pitocin was given through the running IV and uterine massage. The hysterotomy was followed using ring forceps until the bilateral corners were identified. It was then closed with a running locked suture of 0 Monocryl. A left [place] stitch was performed due to brisk arterial bleeding at the leftmost corner of  the hysterotomy. The hysterotomy was noted to be hemostatic. However slow oozing was noted near the right corner of the hysterotomy. Two figure of x stitches were used to achieve hemostasis using 0-Monocryl suture. Surgifoam was then placed near the bilateral corners of the hysterotomy. The posterior cul-de-sac was cleared of all clots and debris. The uterus was returned to the abdomen. The pericolic gutters were cleared of all clots and debris. The hysterotomy was reexamined and noted to be hemostatic. The fascia and rectus muscles were examined and areas of oozing were controlled with electrocautery. Surgifoam was placed over the rectus muscles prior to closing the fascia. The fascia was closed with a running 0 Vicryl suture. The subcutaneous tissue was irrigated and areas of oozing were controlled with electrocautery. The subcutaneous tissue was closed with a running stiches using a 2-0 Plain suture. The skin was closed with a running subcuticular 4-0 Monocryl suture and covered with a sterile dressing.    All sponge, needle, and instrument counts were correct. The patient tolerated the procedure well, and was transferred to recovery in stable condition. Dr. Peña was present and scrubbed for the procedure.     Gila Osei MD  Ob/Gyn PGY-2  11/25/23 4:46 AM    Physician Attestation     I was present for the entire procedure from the beginning to the end. I have reviewed and edited the above operative report which accurately reflects the exact findings and details of the surgical procedure.    Bree Peña MD   November 25, 2023

## 2023-11-25 NOTE — DISCHARGE SUMMARY
[facility] Discharge Summary    Kelly Wakefield MRN# [id number]   Age: 38 year old YOB: 1985     Date of Admission:  2023  Date of Discharge::  2023   Admitting Physician:  RAJ Pruitt CNVALERIA  Discharge Physician:  Gila Chauhan MD              Admission Diagnoses:   Intrauterine pregnancy at 38w5d  Symptomatic COVID  Spontaneous labor   Generalized anxiety disorder          Discharge Diagnosis:     Same, delivered via  section  Cat II FHT   Minimal progress in the second stage   Preeclampsia with severe features  Postpartum hemorrhage  Acute blood loss anemia            Procedures:     Procedure(s): Primary low transverse  section with single layer closure via Pfannenstiel skin incision  Epidural anesthesia                Medications Prior to Admission:     No medications prior to admission.             Discharge Medications:     Discharge Medication List as of 2023  6:03 PM        START taking these medications    Details   acetaminophen (TYLENOL) 325 MG tablet Take 2 tablets (650 mg) by mouth every 6 hours as needed for mild pain Start after Delivery., Disp-100 tablet, R-0, E-Prescribe      hydrOXYzine (ATARAX) 25 MG tablet Take 2-4 tablets ( mg) by mouth every 8 hours as needed for other (Early labor pain. Take with tylenol), Disp-25 tablet, R-1, E-Prescribe      ibuprofen (ADVIL/MOTRIN) 600 MG tablet Take 1 tablet (600 mg) by mouth every 6 hours as needed for moderate pain Start after delivery, Disp-60 tablet, R-0, E-Prescribe      metroNIDAZOLE (FLAGYL) 500 MG tablet Take 1 tablet (500 mg) by mouth 2 times daily for 7 days, Disp-14 tablet, R-0, E-Prescribe      NIFEdipine ER OSMOTIC (ADALAT CC) 90 MG 24 hr tablet Take 1 tablet (90 mg) by mouth daily, Disp-45 tablet, R-0, E-Prescribe      senna-docusate (SENOKOT-S/PERICOLACE) 8.6-50 MG tablet Take 1 tablet by mouth daily Start after delivery., Disp-100 tablet, R-0, E-Prescribe       simethicone (MYLICON) 80 MG chewable tablet Take 1 tablet (80 mg) by mouth 4 times daily as needed for other (gas), Disp-30 tablet, R-0, E-Prescribe           CONTINUE these medications which have CHANGED    Details   oxyCODONE (ROXICODONE) 5 MG tablet Take 1 tablet (5 mg) by mouth every 6 hours as needed for severe pain, Disp-5 tablet, R-0, E-Prescribe           CONTINUE these medications which have NOT CHANGED    Details   docusate (COLACE) 60 MG/15ML syrup Take 100 mg by mouth daily as needed for constipation, Historical      fish oil-omega-3 fatty acids 500 MG capsule Historical      folic acid (FOLVITE) 400 MCG tablet Historical      polyethylene glycol (MIRALAX) 17 GM/Dose powder Take 1 Capful by mouth daily as needed, Historical      Prenatal Vit-Fe Fumarate-FA (PRENATAL MULTIVITAMIN W/IRON) 27-0.8 MG tablet Take 1 tablet by mouth daily, Disp-90 tablet, R-3, E-Prescribe      PSYLLIUM HUSK PO Historical      sertraline (ZOLOFT) 25 MG tablet Take 1 tablet (25 mg) by mouth daily, Disp-90 tablet, R-3, E-Prescribe           STOP taking these medications       nirmatrelvir and ritonavir (PAXLOVID) 300 mg/100 mg therapy pack Comments:   Reason for Stopping:                       Consultations:   OBGYN  Lactation   Social Work           Brief Admission History:   Kelly Wakefield is a 38 year old  at 38w5d admitted to the  CN service with spontaneous latent labor. Her labor progressed rapidly spontaneously and she began pushing. With pushing, she developed a Cat II FHT with fetal tachycardia, minimal variability, and recurrent variable decelerations. She pushed for close to 3 hours with minimal descent. The OB team was then consulted. Given Cat II FHT with minimal labor progress, an urgent  section was recommended. The risks, benefits, and alternatives of  section were discussed with the patient, and she agreed to proceed.        Intraoperative course   The procedure was complicated by a  postpartum hemorrhage.  EBL 1190 mL.    Epidural was not adequate so she was converted to general anesthesia for surgery.     Findings:   No subcutaneous scarring, rectofascial adhesions, or intraabdominal adhesions  Thick meconium stained amniotic fluid  Liveborn male infant in vertex, BOLIVAR presentation. Born at 0259 on 11/25/23. Apgars not yet charted and weight 6 lb 15 oz.  Cord gases: pH Arterial 7.08/ Base excess 12.1/ pH venous 7.15/ Base excess 9.4  Normal uterus, fallopian tubes, and ovaries. Bilateral extensions of the hysterotomy into the broad ligament requiring L O'Electra. Hematoma present at the superior edge of the hysterotomy- not expanding. SurgiFoam placed near bilateral corners of hysterotomy.      See operative report for details.        Postpartum Course   Immediately postpartum she was started on magnesium sulfate for seizure prophylaxis for pre-Eclampsia with severe features by BP criteria. Infant to NICU.  Postpartum course complicated by acute blood loss anemia. On discharge, her pain was well controlled. Her BP was controlled with nifedipine XL 90mg PO daily.  Vaginal bleeding is similar to peak menstrual flow.  Voiding without difficulty.  Ambulating well and tolerating a normal diet, passing flatus and having bowel movements.  No fever or significant wound drainage.  Pumping breastmilk for infant in NICU.    She was discharged on post-partum day #4 in stable condition. She was enrolled in the [facility] program.    Post-partum hemoglobin:   Hemoglobin   Date Value Ref Range Status   11/27/2023 9.9 (L) 11.7 - 15.7 g/dL Final             Discharge Instructions and Follow-Up:     Discharge diet: Regular   Discharge activity: No lifting greater than 20 lbs, pushing, pulling, or other strenuous activity for 6 weeks. Pelvic rest for 6 weeks including no sexual intercourse, tampons, or douching. No driving until you can slam on the brakes without pain or while on narcotic pain medications.    Discharge  follow-up: Follow up with primary OB for routine postpartum visit in 6 weeks  Follow up in 1 week for BP check   Wound care: Keep incision clean and dry           Discharge Disposition:     Discharged to home       Gila Chauhan MD

## 2023-11-25 NOTE — PROVIDER NOTIFICATION
11/25/23 1408   Provider Notification   Provider Name/Title G1 Dr Cleveland   Method of Notification Electronic Page   Request Evaluate-Remote   Notification Reason Vital Signs Change       FYI Pt had 166/114 BP at 1400.

## 2023-11-25 NOTE — PROGRESS NOTES
Post Partum Progress Note  PPD#1    Subjective:  Patient sitting on the side of the bed on arrival this morning. Just sat up, and is feeling a little dizzy and nauseous. Otherwise has not been dizzy. Has been out of bed once to stand on the scale. Reports pain meds are helping, but pain is not yet under great control. She is tolerating PO intake. Lochia present and minimal. Huddleston in place. She has not passed flatus. Had a headache and blurry vision this morning that is now resolved. She denies chest pain, shortness of breath, or RUQ pain.  Pumping for baby in NICU.    Objective:  Vitals:    23 0400 23 0500 23 0600 23 0624   BP: 122/69 125/68 124/67    BP Location: Left arm Left arm Left arm    Patient Position: Semi-Cabrera's Semi-Cabrera's Semi-Cabrera's    Cuff Size:  Adult Regular     Pulse: 102 103 93    Resp: 16 16 18    Temp:       TempSrc:       SpO2: 94%  95%    Weight:    93 kg (205 lb 0.4 oz)   Height:           General: NAD. A&Ox3.  CV: Regular rate, well perfused.   Pulm: Normal respiratory effort.  Abd: Soft, non-tender, moderately distended and tympanic. Fundus is firm and at the umbilicus.    Incision: incision is clean, dry, intact, bandage in place  Ext: 2+ lower extremity edema bilaterally. No calf tenderness.    Weight: 205 lb, down 9 pounds from pre-delivery      Intake/Output Summary (Last 24 hours) at 2023 0709  Last data filed at 2023 0627  Gross per 24 hour   Intake 2540 ml   Output 6400 ml   Net -3860 ml       Assessment/Plan:  38 year old  POD#1 s/p PLTCS at 38w5d  for Cat II FHT and arrest of descent on magnesium for seizure prophylaxis in the setting of pre-eclampsia with severe features by blood pressure criteria.     #Postpartum care  - Encourage routine post-operative goals including ambulation and incentive spirometry  - PNC: Rh negative, baby negative, Rhogam not indicated.   - Rubella immune. No intervention indicated.  - Pain: moderately  controlled on oral medications, only taking 2.5 mg oxycodone given recent paxlovid administration. No concern for respiratory depression on this dose, ok to increase to 5 mg q4h prn. Will also add lidocaine patches, scheduled simethicone.  - Heme: Hgb 13.6 > QBL 1190 ( L O'leary, TXA)> 10.9, had some dizziness at the side of the bed. VSS, repeat hgb pending.  - GI: continue anti-emetics and stool softeners as needed.  - : Huddleston in place. Will remove this AM.  - Pumping for baby in NICU  - BC: need to discuss  - Start lovenox prophylaxis today    #Preeclampsia with severe features (BP)  - s/p 24 hours magnesium   - AM HELLP labs pending  - IV hypertensives prn for sustained severe range BPs   - Daily weights + strict I/Os, starting to diurese well  - D#2 nifedipine 90, will uptitrate prn  - Blood pressures overnight normotensive    Discharge to home likely POD#3    Gila Rosario MD  OB/GYN Resident, PGY-3      Physician Attestation   I, Bree Peña, personally saw and examined Kelly.    I appreciate the note by Dr. Rosario.   I saw and evaluated the patient separately and agree with the findings and plan of care as documented in the note. I have reviewed her vitals, labs, and medications. I have discussed the plan of care with the resident.   She is doing well. BP's controlled on nifedipine.  We reviewed her delivery experience.  She is disappointed that she cannot be with her baby due to recent COVID but accepting.  She has not been up ambulating much yet and her abd is distended.    Hemoglobin   Date Value Ref Range Status   11/26/2023 9.9 (L) 11.7 - 15.7 g/dL Final   11/25/2023 10.9 (L) 11.7 - 15.7 g/dL Final      We discussed routine post op/ pp cares and normal expectations for recovery.  Recommend ambulating to help return of bowel function.  Plan discharge day 3 or 4.      Bree Peña MD  Date of Service (when I saw the patient): November 26, 2023

## 2023-11-25 NOTE — H&P
Kelly Wakefield is a 38 year old female    Patient's last menstrual period was 2023., Estimated Date of Delivery: Dec 4, 2023 is calculated from lmp and verified with U/S     [facility] at 6:06 PM     in early labor.  Membranes are grossly ruptured since 1800    HPI: During her time in triage, her contractions have continued to gain strength and they are very painful. Unsure if she is able to even walk to her car. Offered another cervix check and her cervix changed from closed to 1 cm. Several minutes after the exam her water spontaneously broke for meconium stained fluid. She was then admitted to a labor room.    PRENATAL COURSE  Prenatal care began at 8 wks gestation for a total of 13 prenatal visits.  Total wt gain 50  Prenatal course was complicated by    Patient Active Problem List    Diagnosis Date Noted    Encounter for triage in pregnant patient 2023     Priority: Medium    Prolonged latent phase of labor 2023     Priority: Medium    BV (bacterial vaginosis) 2023     Priority: Medium    Pelvic floor weakness 2023     Priority: Medium    History of miscarriage, currently pregnant 2023     Priority: Medium     With D&C      Missed ab 2021     Priority: Medium    Multiple benign nevi 2015     Priority: Medium    Inflamed seborrheic keratosis 2015     Priority: Medium    Café au lait spot 2015     Priority: Medium       HISTORIES  No Known Allergies  Past Medical History:   Diagnosis Date    Anxiety     BV (bacterial vaginosis) 2023    Varicella      Past Surgical History:   Procedure Laterality Date    APPENDECTOMY      COLONOSCOPY  2009    COLPOSCOPY      DILATION AND CURETTAGE      FOOT SURGERY Right 2015     Family History   Problem Relation Age of Onset    Thyroid Disease Mother     Breast Cancer Mother     Hypertension Mother     Kidney Disease Father     Heart Disease Father     Hypertension  "Father     Idiopathic pulmonary fibrosis Father     Kidney Cancer Father     Liver Cancer Father     Depression Sister     Anxiety Disorder Sister     Parkinsonism Maternal Grandmother     Leukemia Paternal Grandmother     Esophageal Cancer Paternal Grandfather     Cancer No family hx of         no family hx of skin cancer     Social History     Tobacco Use    Smoking status: Former     Types: Cigarettes     Quit date: 2/10/2020     Years since quitting: 3.7    Smokeless tobacco: Never   Substance Use Topics    Alcohol use: Not Currently     OB History    Para Term  AB Living   2 0 0 0 1 0   SAB IAB Ectopic Multiple Live Births   1 0 0 0 0      # Outcome Date GA Lbr Dontrell/2nd Weight Sex Delivery Anes PTL Lv   2 Current            1 2021     SAB          LABS:    Blood type O neg  Rhogam indicated and given on 9/15  Rubella immune   Treponema Pallidum Antibody  Negative    HIV    Non-reactive   Hepatitis B Non-reactive   GBS negative    ROS  Pt denies significant constitutional symptoms including fever and/or malaise.  Pt denies significant respiratory, cardiovacular, GI, or muscular/skeletal complaints.      PHYSICAL EXAM:  Ht 1.651 m (5' 5\")   Wt 97.3 kg (214 lb 8 oz)   LMP 2023   BMI 35.69 kg/m    General appearance healthy, alert, active, and moderate distress   Neuro:  denies headache and visual disturbances  Psych: Mentation normal and bright   Legs: 2+/2+, no clonus, no edema       Abdomen: gravid, single vertex fetus, non-tender, EFW 7 lbs. Pt is gloria every 2-4 minutes, lasting 60 seconds and palpates moderate  Brief BSUS confirm vertex fetal position     FETAL HEART TONES: baseline 135 with moderate FHRV variability and accelerations.  No decelerations present.     PELVIC EXAM: % / -3  BLOODY SHOW:: no  Membranes as listed above    ASSESSMENT:  IUP @ 38 wks gestation  in early labor  NST  reactive   Parity: Primip  GBS negative and membranes ruptured for 0 hours  COVID " positive      PLAN:  Admit - see IP orders  Pain medication morphine and vistaril ordered for early labor pain management.   Anticipate       Yolanda Gonsales, JANELM, RAJ CNM

## 2023-11-25 NOTE — PROVIDER NOTIFICATION
11/25/23 6534   Comments   Comments Bedside report from Colton KENNEDY     Double checked IV fluid rates and IV site. Documented rate verification in MAR. Collaborative fundal check. FF U/U, no lochia.Documented in flow sheet. Plan to transfer to [facility] nurse Myra who has already received report from OB PACU BRENT Segura.

## 2023-11-25 NOTE — PROVIDER NOTIFICATION
11/24/23 1750   Provider Notification   Provider Name/Title Elsa CNM   Method of Notification At Bedside   Request Evaluate in Person   Notification Reason Labor Status;Uterine Activity;SVE;Status Update     CNM at bedside performing SVE after pt reporting uterine cramping feels stronger, 1cm. Plan per CNM to admit patient to labor room and give pain medication to help patient cope with frequent uterine contractions. Patient and spouse verbalizing agreement with plan, and pt transferred to room 471 in w/c, with all belongings and spouse and RN present. Pt and spouse oriented to room 1 room and call light.

## 2023-11-25 NOTE — PROVIDER NOTIFICATION
11/25/23 0844   Provider Notification   Provider Name/Title G1 Dr Cleveland   Method of Notification Electronic Page   Request Evaluate-Remote   Notification Reason Medication Request     Pt needs the prn Hypertensive crisis meds ordered please ASAP

## 2023-11-25 NOTE — ANESTHESIA PROCEDURE NOTES
"Epidural catheter Procedure Note    Pre-Procedure   Staff -        Anesthesiologist:  Veronica Diane MD       Resident/Fellow: Guillermo Michaels MD       Performed By: with residents       Procedure performed by resident/fellow/CRNA in presence of a teaching physician.         Location: OR       Procedure Start/Stop Times: 11/24/2023 9:00 PM and 11/24/2023 9:20 PM       Pre-Anesthestic Checklist: patient identified, IV checked, risks and benefits discussed, informed consent, monitors and equipment checked, pre-op evaluation, at physician/surgeon's request and post-op pain management  Timeout:       Correct Patient: Yes        Correct Procedure: Yes        Correct Site: Yes        Correct Position: Yes   Procedure Documentation  Procedure: epidural catheter       Patient Position: sitting       Skin prep: Chloraprep       Local skin infiltrated with mL of 1% lidocaine.        Insertion Site: L3-4. (midline approach).       Technique: LORT saline        JUAN DIEGO at 6.5 cm.       Needle Type: ToFIT Biotech[employer] needle       Needle Gauge: 17.        Needle Length (Inches): 3.5        Catheter: 19 G.          Catheter threaded easily.         4.5 cm epidural space.         Threaded 11 cm at skin.         # of attempts: 1 and  # of redirects:  3    Assessment/Narrative         Paresthesias: No.       Test dose of 3 mL at 21:16 CST.         Test dose negative, 3 minutes after injection, for signs of intravascular, subdural, or intrathecal injection.       Insertion/Infusion Method: LORT saline       No aspiration negative for Heme or CSF via Epidural Catheter.       Sensory Level Left: T9.       Sensory Level Right: T9.    Medication(s) Administered   0.125% bupivacaine (Epidural) - EPIDURAL   8 mL - 11/24/2023 9:20:00 PM  Medication Administration Time: 11/24/2023 9:00 PM      FOR [facility] ([facility]) ONLY:   Pain Team Contact information: please page the Pain Team [address]. Search \"Pain\". During daytime hours, please page the " attending first. At night please page the resident first.

## 2023-11-25 NOTE — ANESTHESIA CARE TRANSFER NOTE
Patient: Kelly Wakefield    Procedure: Procedure(s):   section       Diagnosis: * No pre-op diagnosis entered *  Diagnosis Additional Information: No value filed.    Anesthesia Type:   Epidural     Note:    Oropharynx: oropharynx clear of all foreign objects and spontaneously breathing  Level of Consciousness: awake  Oxygen Supplementation: face mask  Level of Supplemental Oxygen (L/min / FiO2): 6  Independent Airway: airway patency satisfactory and stable  Dentition: dentition unchanged  Vital Signs Stable: post-procedure vital signs reviewed and stable  Report to RN Given: handoff report given  Patient transferred to: PACU  Comments: -VSS  Handoff Report: Identifed the Patient, Identified the Reponsible Provider, Reviewed the pertinent medical history, Discussed the surgical course, Reviewed Intra-OP anesthesia mangement and issues during anesthesia, Set expectations for post-procedure period and Allowed opportunity for questions and acknowledgement of understanding      Vitals:  Vitals Value Taken Time   /92 23 0455   Temp     Pulse 96 23 0505   Resp 17 23 0505   SpO2 99 % 23 0505   Vitals shown include unfiled device data.    Electronically Signed By: Guillermo Michaels MD  2023  5:06 AM

## 2023-11-25 NOTE — PLAN OF CARE
Goal Outcome Evaluation:    Report given to Myra KENNEDY in PP at 0720 in PACU prior to Aster BARNETT RN transferring pt upstairs with . Pt fundas unchanged from previous check, vitals WDL, and ready for transfer. Mag and Pit double checked with Aster BARNETT RN.

## 2023-11-25 NOTE — CARE PLAN
RN assumed care at 1915. RN and midwife to bedside for uncontrolled pain. SVE per CNM 9.5/100/-1. CNM midwife attempted to push past cervical lip, unsuccessful. Pt had epidural placed for pain control. SVE at 2240 10/100/+1. Pt began pushing. C/S called by MD at 0156 for failure to progress and cat II tracing. Pt put under general anesthesia due to inadequate pain control. Infant delivered at 0259. Pt transferred to PACU at 0450. Report given to Tayler Segura RN and Natalie PACU RN.

## 2023-11-25 NOTE — PLAN OF CARE
Care assumed from [id number]. VSS and postpartum assessments WDL. Pumping every 3-4 hours. Pain managed with tylenol, toradol and oxycodone. Lower dose of oxy prescribed due to recent paxlovid and oxycodone for concern for respiratory depression.  Spouse present and supportive.  Will continue with postpartum cares and education per plan of care. Magnesium infusing at 2 g/hr with LR at 75 mL/ hr. Reflexes WNL, no clonus, RR WNL, denies headache, vision changes, and upper R quadrant pain.

## 2023-11-25 NOTE — PROGRESS NOTES
"Magnesium Check    S: Patient doing okay. No headache, blurry vision, or scotomas. No chest pain, shortness of breath, or RUQ pain. Endorses she continues to be in a lot of post-operative discomfort.     O:  BP (!) 150/98 (BP Location: Right arm, Patient Position: Semi-Cabrera's, Cuff Size: Adult Regular)   Pulse 87   Temp 98.9  F (37.2  C) (Axillary)   Resp 18   Ht 1.651 m (5' 5\")   Wt 97.3 kg (214 lb 8 oz)   LMP 2023   SpO2 95%   Breastfeeding Unknown   BMI 35.69 kg/m      General: AAOx3, NAD  CV: RRR, no murmurs, rubs, or gallops  Resp: CTAB, no wheezes, rales, or rhonchi  Ext: Bilateral patellar and brachioradialis reflexes 2+; trace lower extremity edema bilaterally; SCDs in place    UOP: 1.64ml/kg/hour over 5 hours            (800mL clear urine in felipe at bedside out since 0447)     Labs:  - HELLP labs wnl at 0630     A/P: 38 year old  POD#0 s/p PLTCS at 38w5d  for Cat II FHT and arrest of descent on magnesium for seizure prophylaxis in the setting of pre-eclampsia with severe features by blood pressure. Currently doing okay with no signs of toxicity. BPs remain elevated after one dose of 30mg Nifedipine at 0700. Will add an additional 30mg Nifedipine now for a daily total of 60mg.     # Pre-eclampsia with severe features  - Mag started at 0530; currently running @ 2g/hr   -d/c mag on  at 0530  -HELLP labs wnl at 0630  -IV hypertensives prn for sustained severe range BPs   -UOP adequate at 1.64ml/kg/hour ove r5 hours.  -Continue q4hr Mag checks; next at 1330  - Daily weights + strict I/Os     Marian Cleveland MD  Obstetrics and Gynecology, PGY-1  23 10:10 AM     "

## 2023-11-25 NOTE — PROGRESS NOTES
Called to room for non-reassuring status in  at 38w5d who came in to labor and delivery today in spontaneous labor. She has been pushing since 23:45 and on my arrival to the room she was taking a break due to exhaustion and resting on her side.    She is afebrile.  Baby has been tachy for ~ 1.5 hours and is now having deep variables with each contraction.   Bladder was drained and cervix was checked and found that the bony station was approximately 0 when not pushing with a contraction.   Given repetative decels a  section was recommended and accepted after all risks and benefits were discussed.    Anesthesia was called.    Bree Peña MD     Addendum:  Review of BP's indicates nonsustained severe range BP's intermittently.   Will begin mag immediately following delivery.  HELLP labs are normal.   Bree Peña MD

## 2023-11-25 NOTE — ANESTHESIA PROCEDURE NOTES
Airway       Patient location during procedure: OR       Procedure Start/Stop Times: 11/25/2023 2:53 AM  Staff -        Anesthesiologist:  Veronica Diane MD       Resident/Fellow: Guillermo Michaels MD       Performed By: resident and with residents       Procedure performed by resident/fellow/CRNA in presence of a teaching physician.    Consent for Airway        Urgency: elective  Indications and Patient Condition       Indications for airway management: eagle-procedural       Induction type:intravenous       Mask difficulty assessment: 1 - vent by mask    Final Airway Details       Final airway type: endotracheal airway       Successful airway: ETT - single  Endotracheal Airway Details        ETT size (mm): 6.5       Cuffed: yes       Successful intubation technique: video laryngoscopy       VL Blade Size: Glidescope 3       Grade View of Cords: 2       Adjucts: stylet       Position: Right       Measured from: lips       Secured at (cm): 19       Bite block used: Soft    Post intubation assessment        Placement verified by: capnometry, equal breath sounds and chest rise        Number of attempts at approach: 1       Number of other approaches attempted: 0       Secured with: silk tape       Ease of procedure: easy       Dentition: Intact    Medication(s) Administered   Medication Administration Time: 11/25/2023 2:53 AM

## 2023-11-25 NOTE — PROGRESS NOTES
11/25/23 0819   Provider Notification   Provider Name/Title G2   Method of Notification Electronic Page   Request Evaluate-Remote   Notification Reason Medication Request     Pt in a lot of pain. Per pharmacy cannot have opiods bc of paxlovid. Last took paxlovid 11/24 AM. Can we order something more for pain?

## 2023-11-25 NOTE — PROGRESS NOTES
"Delayed note due to patient care.    Patient checked and complete at 2230 after epidural set up and brief nap. Started to push at 2245.       S:Pushing with great effort in a variety of positions. Coping well.     O:  Blood pressure (!) 191/97, pulse 65, temperature 97.7  F (36.5  C), temperature source Oral, resp. rate 18, height 1.651 m (5' 5\"), weight 97.3 kg (214 lb 8 oz), last menstrual period 2023, SpO2 97%, not currently breastfeeding.  General appearance: comfortable    CONTRACTIONS: Contractions every 3-5 minutes.  Palpate: strong  FETAL HEART TONES: baseline 130 with moderate FHR variability and    accelerations yes. Decelerations yes.    ROM: moderate meconium fluid  PELVIC EXAM:PELVIC EXAM: 10/ 100%/ Mid/ 0   Fetal Position:  cephalic  Bloody show: Yes   Pitocin- none,  Antibiotics- none  Cervical ripening: N/A  ASSESSMENT:  ==============  IUP @ 38w5d second stage labor   Fetal Heart rate tracing Category category two  GBS- negative   COVID infection.  Hypertension with nl pre-e labs. Several severe ranged values that were not sustained.     PLAN:  ===========    Push with contraction. Consider pitocin augmentation as contractions have spaced out.   Pain medication epidural infusing with great relief  Anticipate FIDEL Gonsales, ROXANNA, APRN JANELM, CNM    "

## 2023-11-25 NOTE — PROGRESS NOTES
"  S:Patient called out r/t need for further pain medication despite morphine and vistaril.     O:  Height 1.651 m (5' 5\"), weight 97.3 kg (214 lb 8 oz), last menstrual period 2023, not currently breastfeeding.  General appearance: uncomfortable with contractions    CONTRACTIONS: Contractions every 2 minutes.  Palpate: strong  FETAL HEART TONES: baseline 155 with moderate FHR variability and    accelerations no. Decelerations yes.    ROM: moderate meconium fluid  PELVIC EXAM:PELVIC EXAM: 9.5/ 100%/ Mid/ soft/ 0   Fetal Position:  cephalic  Bloody show: Yes   Pitocin- none,  Antibiotics- none  Cervical ripening: N/A  ASSESSMENT:  ==============  IUP @ 38w4d active labor and good progress. Reduced anterior lip and pushed for 45 min without descent. Felt anterior lip still in place despite previous reduction.   Fetal Heart rate tracing Category category two  GBS- negative     PLAN:  ===========  Stopped pushing and will move forward with epidural and laboring down as fetal heart tones allow.   Anticipate   reevaluate in 2-4 hours/PRN     Yolanda Gonsales CNM, APRN ROXANNA, CNM    "

## 2023-11-26 LAB
ALT SERPL W P-5'-P-CCNC: 11 U/L (ref 0–50)
AST SERPL W P-5'-P-CCNC: 30 U/L (ref 0–45)
CREAT SERPL-MCNC: 0.56 MG/DL (ref 0.51–0.95)
EGFRCR SERPLBLD CKD-EPI 2021: >90 ML/MIN/1.73M2
ERYTHROCYTE [DISTWIDTH] IN BLOOD BY AUTOMATED COUNT: 13.2 % (ref 10–15)
HCT VFR BLD AUTO: 28.6 % (ref 35–47)
HGB BLD-MCNC: 9.9 G/DL (ref 11.7–15.7)
MCH RBC QN AUTO: 32.4 PG (ref 26.5–33)
MCHC RBC AUTO-ENTMCNC: 34.6 G/DL (ref 31.5–36.5)
MCV RBC AUTO: 94 FL (ref 78–100)
PLATELET # BLD AUTO: 185 10E3/UL (ref 150–450)
RBC # BLD AUTO: 3.06 10E6/UL (ref 3.8–5.2)
WBC # BLD AUTO: 15.4 10E3/UL (ref 4–11)

## 2023-11-26 PROCEDURE — 84460 ALANINE AMINO (ALT) (SGPT): CPT

## 2023-11-26 PROCEDURE — 250N000011 HC RX IP 250 OP 636: Mod: JZ

## 2023-11-26 PROCEDURE — 36415 COLL VENOUS BLD VENIPUNCTURE: CPT

## 2023-11-26 PROCEDURE — 250N000013 HC RX MED GY IP 250 OP 250 PS 637

## 2023-11-26 PROCEDURE — 120N000002 HC R&B MED SURG/OB UMMC

## 2023-11-26 PROCEDURE — 84450 TRANSFERASE (AST) (SGOT): CPT

## 2023-11-26 PROCEDURE — 82565 ASSAY OF CREATININE: CPT

## 2023-11-26 PROCEDURE — 85018 HEMOGLOBIN: CPT

## 2023-11-26 RX ORDER — LIDOCAINE 4 G/G
2 PATCH TOPICAL
Status: DISCONTINUED | OUTPATIENT
Start: 2023-11-26 | End: 2023-11-29 | Stop reason: HOSPADM

## 2023-11-26 RX ORDER — MAGNESIUM SULFATE IN WATER 40 MG/ML
INJECTION, SOLUTION INTRAVENOUS
Status: COMPLETED
Start: 2023-11-26 | End: 2023-11-26

## 2023-11-26 RX ADMIN — SIMETHICONE 80 MG: 80 TABLET, CHEWABLE ORAL at 18:04

## 2023-11-26 RX ADMIN — IBUPROFEN 800 MG: 800 TABLET ORAL at 04:11

## 2023-11-26 RX ADMIN — MAGNESIUM SULFATE HEPTAHYDRATE 2 G/HR: 40 INJECTION, SOLUTION INTRAVENOUS at 02:26

## 2023-11-26 RX ADMIN — METRONIDAZOLE 500 MG: 500 TABLET ORAL at 20:54

## 2023-11-26 RX ADMIN — ACETAMINOPHEN 975 MG: 325 TABLET, FILM COATED ORAL at 20:53

## 2023-11-26 RX ADMIN — LIDOCAINE 2 PATCH: 4 PATCH TOPICAL at 09:38

## 2023-11-26 RX ADMIN — METRONIDAZOLE 500 MG: 500 TABLET ORAL at 09:40

## 2023-11-26 RX ADMIN — ACETAMINOPHEN 975 MG: 325 TABLET, FILM COATED ORAL at 09:39

## 2023-11-26 RX ADMIN — SERTRALINE HYDROCHLORIDE 25 MG: 25 TABLET ORAL at 09:41

## 2023-11-26 RX ADMIN — NIFEDIPINE 90 MG: 30 TABLET, FILM COATED, EXTENDED RELEASE ORAL at 09:40

## 2023-11-26 RX ADMIN — ACETAMINOPHEN 975 MG: 325 TABLET, FILM COATED ORAL at 14:27

## 2023-11-26 RX ADMIN — SENNOSIDES AND DOCUSATE SODIUM 2 TABLET: 50; 8.6 TABLET ORAL at 20:53

## 2023-11-26 RX ADMIN — ENOXAPARIN SODIUM 40 MG: 40 INJECTION SUBCUTANEOUS at 20:54

## 2023-11-26 RX ADMIN — ACETAMINOPHEN 975 MG: 325 TABLET, FILM COATED ORAL at 02:29

## 2023-11-26 RX ADMIN — IBUPROFEN 800 MG: 800 TABLET ORAL at 09:40

## 2023-11-26 RX ADMIN — Medication 2.5 MG: at 04:27

## 2023-11-26 RX ADMIN — Medication 5 MG: at 13:02

## 2023-11-26 RX ADMIN — Medication 5 MG: at 21:49

## 2023-11-26 RX ADMIN — IBUPROFEN 800 MG: 800 TABLET ORAL at 14:26

## 2023-11-26 RX ADMIN — IBUPROFEN 800 MG: 800 TABLET ORAL at 20:53

## 2023-11-26 RX ADMIN — METRONIDAZOLE 500 MG: 500 TABLET ORAL at 00:13

## 2023-11-26 RX ADMIN — SENNOSIDES AND DOCUSATE SODIUM 2 TABLET: 50; 8.6 TABLET ORAL at 09:39

## 2023-11-26 ASSESSMENT — ACTIVITIES OF DAILY LIVING (ADL)
ADLS_ACUITY_SCORE: 19

## 2023-11-26 NOTE — PLAN OF CARE
Data: Vital signs within normal limits. Postpartum checks within normal limits - see flow record. Patient eating and drinking normally. Patient has felipe/  No apparent signs of infection.Incision healing well.     Action: Patient medicated during the shift for pain and cramping. See MAR. Patient reassessed within 1 hour after each medication and pain was improved - patient stated she was comfortable. Patient took oxycodone 5mg this shift. Patient also started Lidocaine patched.  Patient education done about breastfeeding and self care. See flow record.    Response: Positive attachment behaviors observed with infant. Support persons was present.   Plan: Anticipate discharge.     will be leaving to quarantine for 5 days this afternoon. His sister will come and be with patient.    left at 1425 11/26/23 to 5 day quarantine from [place] per our policy to be able to visit infant 12/1/23 @ 1430.   NICU to make note in chart ([facility] was called to write note in infant's chart).

## 2023-11-26 NOTE — PLAN OF CARE
Vital signs stable, tachycardic trough the night.  Postpartum checks within normal limits - see flow record. Patient eating and drinking normally. No apparent signs of infection. Incision cover, dressing intact.      Patient medicated with Tylenol, Oxycodone and Ibuprofen  during the shift for abdominal/incision pain and cramping. See MAR.    Magnesium stopped per orders at 0530 today. Per patient she has been feeling slight headache, little crummy and face flushing  around 0500.   Patient sitting on the side of the bed this morning, unable to walk, feeling little dizzy. Huddleston in place, patent and draining.     Problem: Postpartum ( Delivery)  Goal: Hemostasis  2023 by Vannesa Mejia, RN  Outcome: Progressing  2023 by Vannesa Mejia, RN  Outcome: Progressing     Problem: Postpartum ( Delivery)  Goal: Optimal Pain Control and Function  2023 by Vannesa Mejia, RN  Outcome: Progressing  2023 by Vannesa Mejia, RN  Outcome: Progressing    Problem: Postpartum ( Delivery)  Goal: Effective Urinary Elimination  2023 by Vannesa Mejia, RN  Outcome: Progressing  2023 by Vannesa Mejia, RN

## 2023-11-26 NOTE — LACTATION NOTE
This note was copied from a baby's chart.  Lactation Follow Up Note    Reason for visit/ call:  Review NICU lactation admission information (mom previously on magnesium drip, didn't remember anything previously taught)    Supply:  Pumping about every 2.5-3hours, getting drops     Significant changes (medications, equipment, comfort, etc):  No    Skin to skin/ nuzzling/ latching:  Unable to be present in NICU with infant currently, due to Covid quarantine    Education given:    Admission Education given:  [x]Admission packet  [x]Kangaroo care  [x]Benefits of breast milk  [x]How breast milk is made  [x]Stages of milk production  [x]Milk supply/ goal volumes  [x]Hand expression  [x]Hands-on pumping  [x]Collecting, labeling, transporting milk  [x]Cleaning, sanitizing pump parts  [x]Storage of milk    Assisted with pumping session, followed by hand expression demonstration and return demonstration. Discussion of Covid precautions with pumping and milk handling to NICU. Discussed pump options. Kelly has a new Medela Sonata through insurance; offered rental pump if interested (likely will wait).     Plan:  Supply check at DOL 5  Reviewed resources for inpatient lactation support    ANDREI Gonzalez, RN, IBCLC   Lactation Consultant  Ascom: *[phone]  Office: [phone]

## 2023-11-26 NOTE — LACTATION NOTE
This note was copied from a baby's chart.  Lactation Admission Note      Baby Information:  Infant's first name:  Ck  Infant medical history: HIE, mechanical ventilation and therapeutic hypothermia, 38 + 5, AGA,      needed? No    Lactation goal (if known): breastfeeding (thinks she would like to breastfeed and offer occasional bottles)  Lactation history:     Mother's Information: Name: Kelly  Occupation:    Age: 37  Delivery type:     [place]: [place]  Pump for home use: Has Medela at home that she got through insurance prior to delivery    Partner's name: Arnie  Occupation:      Relevant maternal medical and social hx:       Information for the patient's mother:  Kelly Wakefield [[id number]]     Patient Active Problem List   Diagnosis    Multiple benign nevi    Inflamed seborrheic keratosis    Café au lait spot    Missed ab    History of miscarriage, currently pregnant    Pelvic floor weakness    Encounter for triage in pregnant patient    Prolonged latent phase of labor    BV (bacterial vaginosis)      Currently on magnesium d/t pre-e w/ severe features   Also taking Paxlovid: L3-infant monitoring diarrhea and irritability per [facility] and Mothers' Milk.  ?      Relevant maternal medications:   Paxlovid: L3-infant monitoring diarrhea and irritability per [facility] and Mothers' Milk.  ?    Maternal risk factors:  Hypertension (details) Pre-E w/ severe features on magnesium   Significant postpartum hemorrhage (EBL 1190)     Admission Education given:  [x]Admission packet  [x]Kangaroo care  [x]Benefits of breast milk  [x]How breast milk is made  [x]Stages of milk production  [x]Milk supply/ goal volumes  [x]Hand expression  [x]Hands-on pumping  [x]Collecting, labeling, transporting milk  [x]Cleaning, sanitizing pump parts  [x]Storage of milk      Saritha Hirsch RN, IBCLC   Lactation Consultant  Ascom: *[phone]  Office: [phone]

## 2023-11-26 NOTE — PLAN OF CARE
"Goal Outcome Evaluation:      Plan of Care Reviewed With: patient    Overall Patient Progress: improvingOverall Patient Progress: improving       VSS.  Assessments WNL.  Reports dull headache as \"1 or 2\"  - better than this morning and yesterday.  Reported \"lack of crispness\" when reading her phone but much better than yesterday or this morning - pt also reports having astigmatism and not having glasses.  Pt reported getting out of bed once this morning and felt she was ready to get up again -  stand by assist.  Pt able to walk to bathroom and room and stand.  Reported weakness but not dizziness.  Back to bed with call light/phone avail - pt reports understanding to put on call light for assistance to BR/out of bed.  Huddleston removed.  Will attempt to void in a couple of hrs.  Pt reported abdominal pain as \"2\".  Has lidocaine patch and abd binder on and reports relief with  them.  Reviewed pain meds - pt will ask if she wants oxycodone.  Passes flatus/burped while up.  +BS.  Will give mylicon.  Drinking/eating.  Occ dry cough.  Ermedios, sister, is now at bedside to assist.  Pumping - using 24 cm flanges as pt reported instructed by BARRON.  Sees  on laptop -  in NICU. Pt encouraged to call 's RN if she would like updates on Kc.  Covid precautions followed.  Call light is within reach.      Shift change/bedside safety from BRENT Edwards at 1520.     "

## 2023-11-26 NOTE — PROGRESS NOTES
Social Work Progress Note      DATA    Patient is a 1 day old male diagnosed with HIE (hypoxic-ischemic encephalopathy) (H28).     ASSESSMENT  SW attempted to reach Mom by phone. No answers. Left messages. RN reported to SW that patient is feeling sad about visitor restrictions about covid.      INTERVENTION    Conducted chart review and consulted with medical team regarding plan of care.  Validated emotions and provided supportive listening    PLAN    SW will continue to try and reach Kelly by telephone for consult.     Milli Cooper MA, SW  Casual Pediatric Social Worker  On call pager: [phone]

## 2023-11-26 NOTE — PLAN OF CARE
Goal Outcome Evaluation: Postpartum stable. BP's were elevated with X1 severe range pressure. Procardia dose of 30 mg given, will have 90 daily tomorrow. Denies pre-e sx, reflexes are +2, no clonus. Huddleston is patent and draining adequate amounts. Huddleston cares done at 1700. Pumping for baby in NICU. Pain is managed with oxy, tylenol and toradol. Mag infusing at 2g/hr and LR at 75ml/hr. Incision dressing is CDI. Spouse in room for support. Continue POC.      Plan of Care Reviewed With: patient, spouse    Overall Patient Progress: improvingOverall Patient Progress: improving

## 2023-11-26 NOTE — PROVIDER NOTIFICATION
11/25/23 1800   Deep Tendon Reflexes   Left Patellar Reflex 2-->average, normal   Right Patellar Reflex 2-->average, normal   Left Clonus absent   Right Clonus absent   Clonus Beats 0   Provider Notification   Provider Name/Title G3   Method of Notification Electronic Page   Request Evaluate-Remote   Notification Reason Medication Request     Asked the G3 if the pt should continue the medication for BV? Thanks!

## 2023-11-26 NOTE — PROGRESS NOTES
Courtesy visit by ROXANNA. Debriefed about labor events, answered questions. Patient is working on goals of resting, pumping, ambulating and pain control.     Yolanda Gonsales, ROXANNA, APRN JANELM

## 2023-11-26 NOTE — ANESTHESIA POSTPROCEDURE EVALUATION
Patient: Kelly Wakefield    Procedure: Procedure(s):   section       Anesthesia Type:  Epidural    Note:  Disposition: Admission   Postop Pain Control: Uneventful            Sign Out: Well controlled pain   PONV: No   Neuro/Psych: Uneventful            Sign Out: Acceptable/Baseline neuro status   Airway/Respiratory: Uneventful            Sign Out: Acceptable/Baseline resp. status   CV/Hemodynamics: Uneventful            Sign Out: Acceptable CV status; No obvious hypovolemia; No obvious fluid overload   Other NRE: NONE   DID A NON-ROUTINE EVENT OCCUR? No           Last vitals:  Vitals Value Taken Time   /100 23 0555   Temp 36.2  C (97.1  F) 23 0500   Pulse 93 23 0557   Resp 12 23 0557   SpO2 99 % 23 0557       Electronically Signed By: Veronica Diane MD  2023  1:34 PM

## 2023-11-26 NOTE — PROVIDER NOTIFICATION
Can we get an order to discontinue the felipe please? she is more awake, pain is more controlled. She has sat at bedside. She has dangled, and marched paces/   11/26/23 1551   Provider Notification   Provider Name/Title samira kern   Method of Notification Electronic Page   Request Evaluate-Remote   Notification Reason Other

## 2023-11-26 NOTE — PROVIDER NOTIFICATION
11/25/23 7269   Provider Notification   Provider Name/Title G3   Method of Notification Electronic Page   Request Evaluate-Remote   Notification Reason Medication Request  (Pt 0932  Could you please order stop time for Mg.   Thank you)

## 2023-11-27 LAB
ALBUMIN SERPL BCG-MCNC: 2.6 G/DL (ref 3.5–5.2)
ALP SERPL-CCNC: 157 U/L (ref 40–150)
ALT SERPL W P-5'-P-CCNC: 14 U/L (ref 0–50)
ANION GAP SERPL CALCULATED.3IONS-SCNC: 9 MMOL/L (ref 7–15)
AST SERPL W P-5'-P-CCNC: 27 U/L (ref 0–45)
ATRIAL RATE - MUSE: 115 BPM
BILIRUB SERPL-MCNC: 0.3 MG/DL
BUN SERPL-MCNC: 9.2 MG/DL (ref 6–20)
CALCIUM SERPL-MCNC: 8.6 MG/DL (ref 8.6–10)
CHLORIDE SERPL-SCNC: 100 MMOL/L (ref 98–107)
CREAT SERPL-MCNC: 0.6 MG/DL (ref 0.51–0.95)
DEPRECATED HCO3 PLAS-SCNC: 26 MMOL/L (ref 22–29)
DIASTOLIC BLOOD PRESSURE - MUSE: NORMAL MMHG
EGFRCR SERPLBLD CKD-EPI 2021: >90 ML/MIN/1.73M2
ERYTHROCYTE [DISTWIDTH] IN BLOOD BY AUTOMATED COUNT: 13.2 % (ref 10–15)
GLUCOSE SERPL-MCNC: 96 MG/DL (ref 70–99)
HCT VFR BLD AUTO: 29.2 % (ref 35–47)
HGB BLD-MCNC: 9.9 G/DL (ref 11.7–15.7)
INTERPRETATION ECG - MUSE: NORMAL
MCH RBC QN AUTO: 31.8 PG (ref 26.5–33)
MCHC RBC AUTO-ENTMCNC: 33.9 G/DL (ref 31.5–36.5)
MCV RBC AUTO: 94 FL (ref 78–100)
P AXIS - MUSE: 40 DEGREES
PLATELET # BLD AUTO: 213 10E3/UL (ref 150–450)
POTASSIUM SERPL-SCNC: 3.5 MMOL/L (ref 3.4–5.3)
PR INTERVAL - MUSE: 136 MS
PROT SERPL-MCNC: 5.7 G/DL (ref 6.4–8.3)
QRS DURATION - MUSE: 66 MS
QT - MUSE: 320 MS
QTC - MUSE: 442 MS
R AXIS - MUSE: 88 DEGREES
RBC # BLD AUTO: 3.11 10E6/UL (ref 3.8–5.2)
SODIUM SERPL-SCNC: 135 MMOL/L (ref 135–145)
SYSTOLIC BLOOD PRESSURE - MUSE: NORMAL MMHG
T AXIS - MUSE: 23 DEGREES
TSH SERPL DL<=0.005 MIU/L-ACNC: 3.5 UIU/ML (ref 0.3–4.2)
VENTRICULAR RATE- MUSE: 115 BPM
WBC # BLD AUTO: 16.6 10E3/UL (ref 4–11)

## 2023-11-27 PROCEDURE — 93010 ELECTROCARDIOGRAM REPORT: CPT | Performed by: INTERNAL MEDICINE

## 2023-11-27 PROCEDURE — 250N000013 HC RX MED GY IP 250 OP 250 PS 637

## 2023-11-27 PROCEDURE — 85027 COMPLETE CBC AUTOMATED: CPT

## 2023-11-27 PROCEDURE — 93005 ELECTROCARDIOGRAM TRACING: CPT

## 2023-11-27 PROCEDURE — 250N000011 HC RX IP 250 OP 636: Mod: JZ

## 2023-11-27 PROCEDURE — 84443 ASSAY THYROID STIM HORMONE: CPT

## 2023-11-27 PROCEDURE — 36415 COLL VENOUS BLD VENIPUNCTURE: CPT

## 2023-11-27 PROCEDURE — 120N000002 HC R&B MED SURG/OB UMMC

## 2023-11-27 PROCEDURE — 82040 ASSAY OF SERUM ALBUMIN: CPT

## 2023-11-27 RX ORDER — CALCIUM CARBONATE 500 MG/1
500 TABLET, CHEWABLE ORAL 3 TIMES DAILY PRN
Status: DISCONTINUED | OUTPATIENT
Start: 2023-11-27 | End: 2023-11-28

## 2023-11-27 RX ORDER — MAGNESIUM HYDROXIDE/ALUMINUM HYDROXICE/SIMETHICONE 120; 1200; 1200 MG/30ML; MG/30ML; MG/30ML
30 SUSPENSION ORAL EVERY 4 HOURS PRN
Status: DISCONTINUED | OUTPATIENT
Start: 2023-11-27 | End: 2023-11-29 | Stop reason: HOSPADM

## 2023-11-27 RX ORDER — HYDROXYZINE HYDROCHLORIDE 50 MG/1
50 TABLET, FILM COATED ORAL EVERY 6 HOURS PRN
Status: DISCONTINUED | OUTPATIENT
Start: 2023-11-27 | End: 2023-11-29 | Stop reason: HOSPADM

## 2023-11-27 RX ADMIN — IBUPROFEN 800 MG: 800 TABLET ORAL at 20:27

## 2023-11-27 RX ADMIN — SIMETHICONE 80 MG: 80 TABLET, CHEWABLE ORAL at 13:15

## 2023-11-27 RX ADMIN — ACETAMINOPHEN 975 MG: 325 TABLET, FILM COATED ORAL at 09:29

## 2023-11-27 RX ADMIN — SIMETHICONE 80 MG: 80 TABLET, CHEWABLE ORAL at 20:27

## 2023-11-27 RX ADMIN — ALUMINUM HYDROXIDE, MAGNESIUM HYDROXIDE, AND SIMETHICONE 30 ML: 1200; 120; 1200 SUSPENSION ORAL at 03:02

## 2023-11-27 RX ADMIN — SIMETHICONE 80 MG: 80 TABLET, CHEWABLE ORAL at 08:39

## 2023-11-27 RX ADMIN — SERTRALINE HYDROCHLORIDE 25 MG: 25 TABLET ORAL at 08:39

## 2023-11-27 RX ADMIN — IBUPROFEN 800 MG: 800 TABLET ORAL at 09:29

## 2023-11-27 RX ADMIN — ACETAMINOPHEN 975 MG: 325 TABLET, FILM COATED ORAL at 20:27

## 2023-11-27 RX ADMIN — NIFEDIPINE 90 MG: 30 TABLET, FILM COATED, EXTENDED RELEASE ORAL at 08:39

## 2023-11-27 RX ADMIN — CALCIUM CARBONATE (ANTACID) CHEW TAB 500 MG 500 MG: 500 CHEW TAB at 00:31

## 2023-11-27 RX ADMIN — SENNOSIDES AND DOCUSATE SODIUM 2 TABLET: 50; 8.6 TABLET ORAL at 20:27

## 2023-11-27 RX ADMIN — IBUPROFEN 800 MG: 800 TABLET ORAL at 03:02

## 2023-11-27 RX ADMIN — SENNOSIDES AND DOCUSATE SODIUM 2 TABLET: 50; 8.6 TABLET ORAL at 08:38

## 2023-11-27 RX ADMIN — IBUPROFEN 800 MG: 800 TABLET ORAL at 15:43

## 2023-11-27 RX ADMIN — ENOXAPARIN SODIUM 40 MG: 40 INJECTION SUBCUTANEOUS at 21:39

## 2023-11-27 RX ADMIN — LIDOCAINE 2 PATCH: 4 PATCH TOPICAL at 09:31

## 2023-11-27 RX ADMIN — ACETAMINOPHEN 975 MG: 325 TABLET, FILM COATED ORAL at 03:02

## 2023-11-27 RX ADMIN — ACETAMINOPHEN 975 MG: 325 TABLET, FILM COATED ORAL at 15:44

## 2023-11-27 RX ADMIN — METOCLOPRAMIDE 10 MG: 10 TABLET ORAL at 18:17

## 2023-11-27 RX ADMIN — BISACODYL 10 MG: 10 SUPPOSITORY RECTAL at 00:16

## 2023-11-27 ASSESSMENT — ACTIVITIES OF DAILY LIVING (ADL)
ADLS_ACUITY_SCORE: 19

## 2023-11-27 NOTE — PLAN OF CARE
Afebrile, blood pressures WNL, pulse tachycardic. Patient is ambulating in room with stand by assistance. She is voiding without difficulty. Fundus is firm with scant lochia. Incision with dressing intact, patient declined this RN to remove as she would like to remove in shower this am. Patient's abdomen is distended and tympanic. She states that she has not passed flatus since delivery. Bowel sounds hypoactive, but present in all quadrants. Patient has ambulated, used heat packs/aqua k, simethicone, stool softeners, maalox and suppository with no results at this time. She has not been able to sleep well due to abd. Distention, frequent burping/heartburn and feeling need to ambulate to help with this. She declines atarax. She states her incisional pain and cramping controlled with scheduled ibuprofen, tylenol and intermittently oxycodone. Awaiting MD assessment for any further plan/orders. Patient pumping for baby in NICU. Will continue to provide support and education as needed. Cluster cares to facilitate periods of sleep. Continue present cares.

## 2023-11-27 NOTE — PROVIDER NOTIFICATION
11/27/23 0110   Provider Notification   Provider Name/Title Dr. Mary Jo Garcia   Method of Notification Electronic Page   Request Evaluate-Remote   Notification Reason Other     Patient continues to c/o heartburn. Has taken tums. She has also had suppository for abdominal distention. She states has taken pepcid during pregnancy, but she is currently taking flagyl. Do you have any other ideas?

## 2023-11-27 NOTE — PROVIDER NOTIFICATION
11/27/23 0328   Provider Notification   Provider Name/Title Dr. Mary Jo Garcia   Method of Notification Electronic Page   Request Evaluate in Person   Notification Reason Other     Patient is complaining of heart palpitations. Abdomen is more distended. States has not passed flatus since delivery. Could you come see patient?

## 2023-11-27 NOTE — PROVIDER NOTIFICATION
11/27/23 0205   Provider Notification   Provider Name/Title Dr. Mary Jo Garcia   Method of Notification Phone     Discussed patient c/o heartburn not resolved with tums.  Denies epigastric pain. Upper abdomen non-tender to palpation. Abdomen distended, suppository given. Patient currently taking flagyl for BV. At this time, will give dose of maalox as is several hours from last dose of flagyl. Plan to reevaluate need for flagyl in am, if possible to discontinue, may restart pepcid per Dr. Garcia.

## 2023-11-27 NOTE — PROGRESS NOTES
Post Partum Progress Note    Subjective:  Sleeping in bed on my arrival. Has been ambulating without dizziness. Still has not passed gas and is feeling very distended. Is uncomfortable from that and burping quite a bit. Was having some heartburn earlier but that has since resolved with medication. Pain from surgery overall feeling improved. She is tolerating PO intake though does have some nausea, not eating a lot of food. Felt like she was having palpitations earlier which she feels when she is anxious. They are gone. No chest pain or SOB. Lochia present and minimal. Voiding. No headache or blurry vision. She denies RUQ pain.  Pumping for baby in NICU.    Objective:  Vitals:    23 1548 23 2040 23 0100 23 0333   BP: 129/83 134/82 127/66 134/86   BP Location: Right arm Right arm Right arm Right arm   Patient Position: Semi-Cabrera's Semi-Cabrera's Semi-Cabrera's Semi-Cabrera's   Cuff Size: Adult Regular Adult Regular Adult Regular Adult Regular   Pulse: 94 96 110 109   Resp: 16 16 16 18   Temp: 98.2  F (36.8  C) 98.4  F (36.9  C) 98.3  F (36.8  C) 98.5  F (36.9  C)   TempSrc: Oral Oral Oral Oral   SpO2: 96%   94%   Weight:       Height:           General: NAD. A&Ox3.  CV: Regular rate, well perfused.   Pulm: Normal respiratory effort.  Abd: Soft, non-tender, moderately distended and tympanic. Fundus is firm and at the umbilicus.    Incision: incision is clean, dry, intact, bandage in place. She plans to take this off in the shower today, declines me taking it off now.   Ext: 2+ lower extremity edema bilaterally. No calf tenderness.    Weight: 205 lb, down 9 pounds from pre-delivery. Weight this AM still needs to be collected.       Intake/Output Summary (Last 24 hours) at 2023 0531  Last data filed at 2023 0358  Gross per 24 hour   Intake 4160 ml   Output 6200 ml   Net -2040 ml       Assessment/Plan:  38 year old  POD#2 s/p PLTCS at 38w5d  for Cat II FHT and arrest of descent  on, s/p 24 hours of magnesium for seizure prophylaxis in the setting of pre-eclampsia with severe features by blood pressure criteria.     #Postpartum care  - Encourage routine post-operative goals including ambulation and incentive spirometry  - PNC: Rh negative, baby negative, Rhogam not indicated.   - Rubella immune. No intervention indicated.  - Pain: improved on PO medications  - Abdominal distention: not yet passing gas, no rebound or guarding, no ongoing signs of anemia. At this time, concern for developing postoperative ileus vs just return of bowel function. No indication for NPO at this time, though did discuss indicating to us if she has increased nausea/vomiting.   - Heme: Hgb 13.6 > QBL 1190 ( L [place], TXA)> 10.9 > 9.9. She is asx from an ABLA.   - GI: continue anti-emetics and stool softeners as needed.  - : Huddleston in place. Will remove this AM.  - Pumping for baby in NICU  - BC: Eventually vasectomy. Likely IUD > vasc.  - Start lovenox prophylaxis today    # COVID   -isolation precautions.     # Palpitations, resolved  - EKG nl (reviewed with ED provider). AM electrolytes and TSH ordered.     #Preeclampsia with severe features (BP)  - s/p 24 hours magnesium   - AM HELLP labs pending  - IV hypertensives prn for sustained severe range BPs   - Daily weights + strict I/Os, starting to diurese well  - D#3 nifedipine 90, will uptitrate prn  - Blood pressures overnight normotensive    Dispo: pending blood pressures and postoperative goals.     Mary Jo Garcia MD MPH  OB/Gyn Resident PGY-3  11/27/2023  5:40 AM          Physician Attestation   I personally examined and evaluated this patient.  I discussed the patient with the resident/fellow and care team, and agree with the assessment and plan of care as documented in the note.     Key findings: Doing ok. Burping a lot and mild nausea.   Bowel sounds are active.   Discussed slow return of bowel function. Discussed expectant management unless nausea and  vomiting worsens. Caution with diet.   Iron on discharge for acute blood loss anemia.     Anticipate discharge POD#4.     Please see A&P for additional details of medical decision making.    I have personally reviewed the following data over the past 24 hrs:    16.6 (H)  \   9.9 (L)   / 213     135 100 9.2 /  96   3.5 26 0.60 \     ALT: 14 AST: 27 AP: 157 (H) TBILI: 0.3   ALB: 2.6 (L) TOT PROTEIN: 5.7 (L) LIPASE: N/A     TSH: 3.50 T4: N/A A1C: N/A         Lilliana Tipton MD  Date of Service (when I saw the patient): 11/27/23

## 2023-11-27 NOTE — PROVIDER NOTIFICATION
11/27/23 0359   Provider Notification   Provider Name/Title Dr. Mary Jo Garcia   Method of Notification Phone     Received page and will be up to see patient. Patient states has had palpitations in the past when anxious. Orders placed for atarax. Await results of EKG.

## 2023-11-27 NOTE — PROVIDER NOTIFICATION
11/27/23 0429   Provider Notification   Provider Name/Title Dr. Mary Jo Garcia   Method of Notification Electronic Page     Results back from EKG, please call me if you'd like me to do anything different, otherwise I'll let her sleep and see you when you come up.

## 2023-11-28 ENCOUNTER — APPOINTMENT (OUTPATIENT)
Dept: GENERAL RADIOLOGY | Facility: CLINIC | Age: 38
End: 2023-11-28
Payer: COMMERCIAL

## 2023-11-28 LAB — PLATELET # BLD AUTO: 412 10E3/UL (ref 150–450)

## 2023-11-28 PROCEDURE — 250N000013 HC RX MED GY IP 250 OP 250 PS 637

## 2023-11-28 PROCEDURE — 85049 AUTOMATED PLATELET COUNT: CPT

## 2023-11-28 PROCEDURE — 71046 X-RAY EXAM CHEST 2 VIEWS: CPT | Mod: 26 | Performed by: RADIOLOGY

## 2023-11-28 PROCEDURE — 71046 X-RAY EXAM CHEST 2 VIEWS: CPT

## 2023-11-28 PROCEDURE — 120N000002 HC R&B MED SURG/OB UMMC

## 2023-11-28 PROCEDURE — 250N000011 HC RX IP 250 OP 636

## 2023-11-28 PROCEDURE — 36415 COLL VENOUS BLD VENIPUNCTURE: CPT

## 2023-11-28 PROCEDURE — 250N000013 HC RX MED GY IP 250 OP 250 PS 637: Performed by: OBSTETRICS & GYNECOLOGY

## 2023-11-28 RX ORDER — CALCIUM CARBONATE 500 MG/1
500-1000 TABLET, CHEWABLE ORAL 3 TIMES DAILY PRN
Status: DISCONTINUED | OUTPATIENT
Start: 2023-11-28 | End: 2023-11-29 | Stop reason: HOSPADM

## 2023-11-28 RX ORDER — FUROSEMIDE 10 MG/ML
20 INJECTION INTRAMUSCULAR; INTRAVENOUS ONCE
Qty: 2 ML | Refills: 0 | Status: DISCONTINUED | OUTPATIENT
Start: 2023-11-28 | End: 2023-11-28

## 2023-11-28 RX ORDER — FAMOTIDINE 20 MG/1
20 TABLET, FILM COATED ORAL 2 TIMES DAILY
Status: DISCONTINUED | OUTPATIENT
Start: 2023-11-28 | End: 2023-11-29 | Stop reason: HOSPADM

## 2023-11-28 RX ORDER — FUROSEMIDE 20 MG
20 TABLET ORAL DAILY
Status: DISCONTINUED | OUTPATIENT
Start: 2023-11-28 | End: 2023-11-28

## 2023-11-28 RX ORDER — OXYCODONE HYDROCHLORIDE 5 MG/1
5 TABLET ORAL EVERY 6 HOURS PRN
Qty: 5 TABLET | Refills: 0 | Status: SHIPPED | OUTPATIENT
Start: 2023-11-28 | End: 2023-11-29

## 2023-11-28 RX ORDER — FAMOTIDINE 10 MG
10 TABLET ORAL 2 TIMES DAILY
Status: DISCONTINUED | OUTPATIENT
Start: 2023-11-28 | End: 2023-11-28

## 2023-11-28 RX ORDER — FUROSEMIDE 40 MG
40 TABLET ORAL DAILY
Status: DISCONTINUED | OUTPATIENT
Start: 2023-11-28 | End: 2023-11-29 | Stop reason: HOSPADM

## 2023-11-28 RX ADMIN — ACETAMINOPHEN 975 MG: 325 TABLET, FILM COATED ORAL at 09:46

## 2023-11-28 RX ADMIN — IBUPROFEN 800 MG: 800 TABLET ORAL at 02:59

## 2023-11-28 RX ADMIN — IBUPROFEN 800 MG: 800 TABLET ORAL at 15:32

## 2023-11-28 RX ADMIN — SIMETHICONE 80 MG: 80 TABLET, CHEWABLE ORAL at 15:31

## 2023-11-28 RX ADMIN — FAMOTIDINE 20 MG: 20 TABLET ORAL at 21:05

## 2023-11-28 RX ADMIN — IBUPROFEN 800 MG: 800 TABLET ORAL at 21:06

## 2023-11-28 RX ADMIN — ACETAMINOPHEN 975 MG: 325 TABLET, FILM COATED ORAL at 21:06

## 2023-11-28 RX ADMIN — CALCIUM CARBONATE (ANTACID) CHEW TAB 500 MG 500 MG: 500 CHEW TAB at 08:22

## 2023-11-28 RX ADMIN — IBUPROFEN 800 MG: 800 TABLET ORAL at 08:20

## 2023-11-28 RX ADMIN — ENOXAPARIN SODIUM 40 MG: 40 INJECTION SUBCUTANEOUS at 21:06

## 2023-11-28 RX ADMIN — SERTRALINE HYDROCHLORIDE 25 MG: 25 TABLET ORAL at 08:07

## 2023-11-28 RX ADMIN — NIFEDIPINE 90 MG: 30 TABLET, FILM COATED, EXTENDED RELEASE ORAL at 08:07

## 2023-11-28 RX ADMIN — ONDANSETRON 4 MG: 4 TABLET, ORALLY DISINTEGRATING ORAL at 05:28

## 2023-11-28 RX ADMIN — ACETAMINOPHEN 975 MG: 325 TABLET, FILM COATED ORAL at 15:30

## 2023-11-28 RX ADMIN — ACETAMINOPHEN 975 MG: 325 TABLET, FILM COATED ORAL at 02:59

## 2023-11-28 RX ADMIN — FUROSEMIDE 40 MG: 40 TABLET ORAL at 11:51

## 2023-11-28 RX ADMIN — FAMOTIDINE 10 MG: 10 TABLET ORAL at 08:06

## 2023-11-28 RX ADMIN — SIMETHICONE 80 MG: 80 TABLET, CHEWABLE ORAL at 08:21

## 2023-11-28 RX ADMIN — LIDOCAINE 2 PATCH: 4 PATCH TOPICAL at 09:46

## 2023-11-28 RX ADMIN — SIMETHICONE 80 MG: 80 TABLET, CHEWABLE ORAL at 02:59

## 2023-11-28 RX ADMIN — SENNOSIDES AND DOCUSATE SODIUM 2 TABLET: 50; 8.6 TABLET ORAL at 08:07

## 2023-11-28 ASSESSMENT — ACTIVITIES OF DAILY LIVING (ADL)
ADLS_ACUITY_SCORE: 19

## 2023-11-28 NOTE — PLAN OF CARE
Goal Outcome Evaluation:       VSS, BP mildly elevated 144/90 this morning .Denies s/sx of pre-e.reflexes was normal, pt is feeling  weak muscles and nausea,  medication was given and provider was notified for the nausea and RUQ pain in person. postpartum assessments WNL. Pt has adequate output, get up independently,Incision is open to air, lochia WNL, no s/s infection. Breastfeeding independently and pumping. Taking tylenol and ibuprofen for pain . Baby is in the NICU. Pt is agreeable with her plan of care. Support person present. Will continue with plan of care.

## 2023-11-28 NOTE — PLAN OF CARE
Goal Outcome Evaluation:      Plan of Care Reviewed With: patient    Overall Patient Progress: improvingOverall Patient Progress: improving    Outcome Evaluation: Patient is stable and her pain is well managed with pain meds. However, she feels uncomfortable for not having flatus yet but burping. She's been ambulating in the room with no issues. Feeling nauseous this evening and was given anti emetic and claimed feels better. Pumping for baby in NICU. I and O monitored and recorded. Will continue with plan of care.

## 2023-11-28 NOTE — PROVIDER NOTIFICATION
11/28/23 0734   Provider Notification   Provider Name/Title Dr. Sierra   Method of Notification Electronic Page   Request Evaluate-Remote   Notification Reason   (change orders)     can you please change the order of chest X ray to portal chest Xray, because the pt is Covid positive.  thanks

## 2023-11-28 NOTE — PLAN OF CARE
Goal Outcome Evaluation:      Plan of Care Reviewed With: patient    Overall Patient Progress: improvingOverall Patient Progress: improving    Outcome Evaluation: Patient is feeling better today as claimed. Heartburn and  RUQ pain has been  resolved as claimed.  Had 2x bowel movemenet today. Getting more result from pumping. Her sister is with her and very supportive. Will continue with plan of care.

## 2023-11-28 NOTE — PROGRESS NOTES
Post Partum Progress Note    Subjective:  Resting in bed, pumping on my arrival. Has been ambulating without dizziness. Has passed small amount of gas and is feeling very distended. Is uncomfortable from that and burping quite a bit. Endorses intermittent ongoing heartburn. Also notes possibly increasing shortness of breath with activity over the course of yesterday and this morning. Pain from surgery overall feeling improved. She is tolerating PO intake though does have some nausea, not eating a lot of food. No chest pain. Lochia present and minimal. Voiding. No headache or blurry vision. She denies RUQ pain.  Pumping for baby in NICU.     Objective:  Vitals:    23 1547 23 2019 23 0120 23 0530   BP: 130/76 127/79 138/86 (!) 144/90   BP Location:  Left arm Left arm Left arm   Patient Position:  Semi-Cabrera's Semi-Cabrera's Semi-Cabrera's   Cuff Size:  Adult Regular Adult Regular Adult Regular   Pulse: 99 110 110 101   Resp: 16 16 16 16   Temp: 98  F (36.7  C) 98.3  F (36.8  C) 98.9  F (37.2  C) 98.7  F (37.1  C)   TempSrc: Oral Oral Oral Oral   SpO2:       Weight:    91.3 kg (201 lb 4.8 oz)   Height:           General: NAD. A&Ox3.  CV: Mildly tachycardic to regular rate, normal rhythm; well perfused.   Pulm: Lungs with congestion bilaterally, trace expiratory wheeze in right mid-lower lobe.  Abd: Soft, non-tender, moderately distended and tympanic. Fundus is firm and at the umbilicus.    Incision: incision is clean, dry, intact, overlying steri strips in place.  Ext: 2+ lower extremity edema bilaterally. No calf tenderness.    Weights:   Date/Time Weight   23 0831 92 kg (202 lb 13.2 oz)   23 0624 93 kg (205 lb 0.4 oz)   23 1500 97.3 kg (214 lb 8 oz)     Intake/Output Summary (Last 24 hours) at 2023 0809  Last data filed at 2023 0540  Gross per 24 hour   Intake 750 ml   Output 3950 ml   Net -3200 ml         Assessment/Plan:  38 year old  POD#3 s/p PLTCS at  38w5d  for Cat II FHT and arrest of descent on, s/p 24 hours of magnesium for seizure prophylaxis in the setting of pre-eclampsia with severe features by blood pressure criteria.     #Postpartum care  - Encourage routine post-operative goals including ambulation and incentive spirometry  - PNC: Rh negative, baby negative, Rhogam not indicated.  - Rubella immune. No intervention indicated.  - Pain: improved on PO medications  - Abdominal distention: passing small amounts of gas, small stool; no rebound or guarding, no ongoing signs of anemia. At this time, concern for developing postoperative ileus vs just return of bowel function. No indication for NPO at this time, though did discuss indicating to us if she has increased nausea/vomiting. Encouraged self restricting to CLD this morning.   - Heme: Hgb 13.6 > QBL 1190 ( ROC SETHI'learSHAE leyvaA)> 10.9 > 9.9. She is asx from an ABLA.   - GI: continue anti-emetics and stool softeners as needed.  - : s/p felipe, voiding spontaneously  - Pumping for baby in NICU   - BC: Eventually vasectomy. Likely IUD @ 6wk > vasc.  - Started lovenox prophylaxis 11/27    # Shortness of breath  With new shortness of breath x2 days and expiratory wheeze on R side, some concern for aspiration pneumonia or postop atelectasis; will get CXR this morning to evaluate. Bilateral lower extremity edema and exam not consistent with DVT, low suspicion for PE at this time. Will consider lasix if pulmonary edema.    # COVID   -isolation precautions.    # Palpitations, resolved  - EKG nl (reviewed with ED provider). 11/27 AM electrolytes and TSH wnl    #Preeclampsia with severe features (BP)  - s/p 24 hours magnesium   - HELLP labs normal   - IV hypertensives prn for sustained severe range BPs   - Daily weights + strict I/Os, starting to diurese well with appropriately downtrending weights   - D#4 nifedipine 90, will uptitrate prn. One LMR blood pressure this morning, otherwise blood pressures over last 24 hours  have been wnl.     Dispo: pending blood pressures and postoperative goals.     Lynn Longo MD  Obstetrics & Gynecology, PGY-2  11/28/2023 8:11 AM    Attestation:   This patient was seen and evaluated by me, separately from the house staff team. I have reviewed the note/plan above and agree.     Passing flatus now but still distended. She is also having bad GERD. Will do pepcid 20 mg BID and tums PRN. Discussed CXR with small atelectasis left side so taking deep breaths, use IS.   Will give some lasix today to mobilize some fluid in LE--discussed. Monitor BP's on nifedipine 90 mg.  Routine discharge instructions reviewed and questions answered. Will do script for #5 oxy to go home with since not using here but she requests.     Maria Ines Coleman MD

## 2023-11-29 ENCOUNTER — LACTATION ENCOUNTER (OUTPATIENT)
Age: 38
End: 2023-11-29

## 2023-11-29 VITALS
WEIGHT: 195.55 LBS | HEART RATE: 82 BPM | TEMPERATURE: 97.9 F | SYSTOLIC BLOOD PRESSURE: 133 MMHG | RESPIRATION RATE: 16 BRPM | OXYGEN SATURATION: 94 % | DIASTOLIC BLOOD PRESSURE: 87 MMHG | HEIGHT: 65 IN | BODY MASS INDEX: 32.58 KG/M2

## 2023-11-29 PROBLEM — O14.10 PREECLAMPSIA, SEVERE: Status: ACTIVE | Noted: 2023-11-29

## 2023-11-29 PROCEDURE — 250N000013 HC RX MED GY IP 250 OP 250 PS 637: Performed by: OBSTETRICS & GYNECOLOGY

## 2023-11-29 PROCEDURE — 250N000013 HC RX MED GY IP 250 OP 250 PS 637

## 2023-11-29 RX ORDER — ACETAMINOPHEN 325 MG/1
650 TABLET ORAL EVERY 6 HOURS PRN
Qty: 100 TABLET | Refills: 0 | Status: SHIPPED | OUTPATIENT
Start: 2023-11-29 | End: 2024-01-22

## 2023-11-29 RX ORDER — OXYCODONE HYDROCHLORIDE 5 MG/1
5 TABLET ORAL EVERY 6 HOURS PRN
Qty: 5 TABLET | Refills: 0 | Status: SHIPPED | OUTPATIENT
Start: 2023-11-29 | End: 2023-12-08

## 2023-11-29 RX ORDER — SIMETHICONE 80 MG
80 TABLET,CHEWABLE ORAL 4 TIMES DAILY PRN
Qty: 30 TABLET | Refills: 0 | Status: SHIPPED | OUTPATIENT
Start: 2023-11-29 | End: 2024-03-21

## 2023-11-29 RX ORDER — IBUPROFEN 600 MG/1
600 TABLET, FILM COATED ORAL EVERY 6 HOURS PRN
Qty: 60 TABLET | Refills: 0 | Status: SHIPPED | OUTPATIENT
Start: 2023-11-29 | End: 2024-03-21

## 2023-11-29 RX ORDER — AMOXICILLIN 250 MG
1 CAPSULE ORAL DAILY
Qty: 100 TABLET | Refills: 0 | Status: SHIPPED | OUTPATIENT
Start: 2023-11-29 | End: 2024-03-21

## 2023-11-29 RX ORDER — NIFEDIPINE 90 MG/1
90 TABLET, FILM COATED, EXTENDED RELEASE ORAL DAILY
Qty: 45 TABLET | Refills: 0 | Status: SHIPPED | OUTPATIENT
Start: 2023-11-30 | End: 2024-03-21

## 2023-11-29 RX ADMIN — FAMOTIDINE 20 MG: 20 TABLET ORAL at 10:06

## 2023-11-29 RX ADMIN — FUROSEMIDE 40 MG: 40 TABLET ORAL at 13:07

## 2023-11-29 RX ADMIN — NIFEDIPINE 90 MG: 30 TABLET, FILM COATED, EXTENDED RELEASE ORAL at 10:06

## 2023-11-29 RX ADMIN — ACETAMINOPHEN 975 MG: 325 TABLET, FILM COATED ORAL at 10:05

## 2023-11-29 RX ADMIN — IBUPROFEN 800 MG: 800 TABLET ORAL at 05:27

## 2023-11-29 RX ADMIN — SERTRALINE HYDROCHLORIDE 25 MG: 25 TABLET ORAL at 10:06

## 2023-11-29 RX ADMIN — ACETAMINOPHEN 975 MG: 325 TABLET, FILM COATED ORAL at 16:50

## 2023-11-29 RX ADMIN — IBUPROFEN 800 MG: 800 TABLET ORAL at 10:06

## 2023-11-29 RX ADMIN — ACETAMINOPHEN 975 MG: 325 TABLET, FILM COATED ORAL at 05:27

## 2023-11-29 RX ADMIN — LIDOCAINE 2 PATCH: 4 PATCH TOPICAL at 10:06

## 2023-11-29 RX ADMIN — IBUPROFEN 800 MG: 800 TABLET, FILM COATED ORAL at 16:50

## 2023-11-29 RX ADMIN — SENNOSIDES AND DOCUSATE SODIUM 1 TABLET: 50; 8.6 TABLET ORAL at 10:05

## 2023-11-29 ASSESSMENT — ACTIVITIES OF DAILY LIVING (ADL)
ADLS_ACUITY_SCORE: 19

## 2023-11-29 NOTE — PROGRESS NOTES
Nursing management created plan for mom to room in a wing room in NICU this evening.  Charge nurse in NICU will call Charge in CC when room is  ready.  Mom will not be able to leave the room until discharge.  This writer spoke with bedside nurse and patient to relay this information      Mom called to express concern that she is being discharged this evening and her baby is in the NICU.  Mom was told by IP she can't visit baby until Friday due to her COVID diagnosis.  Mom reports this is her first baby and she needs to spend time learning to care for him before they are discharged.  This writer spoke to Bonnie Avilez nurse manager, and charge nurse.  Bonnie orosco nurse management is working with the medical team to develop a plan to ensure mom gets time with baby and discharge teaching prior to discharge.  This writer shared this information with mom.     Jerrica URIAS, MSW, [facility]  Maternal Child Health   [phone]--office  [phone]--pager

## 2023-11-29 NOTE — LACTATION NOTE
"This note was copied from a baby's chart.  LACTATION DISCHARGE INSTRUCTIONS      Congratulations on your approaching discharge day!  Our goal is to help you have all the information, skills and equipment you need to help you meet your lactation goals at home.        [facility] HANDOUT ON STORING AND PREPARING HUMAN MILK AT HOME    Please see attached handout   https://www.cdc.gov/breastfeeding/recommendations/handling_breastmilk.htm      FEEDING LOG: BABY'S FIRST WEEK, SECOND WEEK AND BEYOND    Please see attached feeding logs  Goal is to eat at least 8 times in 24 hours  Goal is to have at least 6 wet diapers in 24 hours  Talk to your provider about goal for soiled diapers.  Each baby is different depending on age and what they are eating      OTHER DISCHARGE INFORMATION    Medications:   Some women may find certain types of hormonal birth control, decongestants or antihistamines may impact supply-- talk to your provider.  Always get a second opinion from a lactation consultant or a provider familiar with lactation if told to stop latching or \"pump and dump\" when starting a new medication, having a procedure or you are ill; most of the time things are compatible.  While there is concern for COVID-- wash your hands and wear a mask when feeding/ holding your baby.  Continue to sanitize baby feeding supplies every 24 hours.        LACTATION SUPPORT    [place] [facility] [phone]     RAJ Harrington, ROXANNA, IBCLC  Tuesday:  [facility],  8:30 - 5:00  Wednesday:  [facility], 7th floor, 8:30 - 4:00  Thursday:  [facility], 8:30 - 4:00    [facility]:  [phone] or [phone]    [facility]:  [phone]    [facility]:  [phone]    M [facility]:  [phone]    [facility]:  [phone]     Health " "[facility]:  [phone]    [facility]:  [phone] or [phone]    [facility]:  [phone]    [facility]:  [phone]    [facility]:  [phone]    [facility] or [facility]:  [phone]      Other Lactation Help:  [facility] Chhaya/ [place] (Tues/Wed)     [phone]  [place] Baby Weigh In (various times and locations)    www.[employer] ++HAS VIRTUAL SUPPORT++   Enlightened Mama   www.Karisma Kidz [phone]  Everyday Miracles         https://www.everyday-miracles.org/  Health [facility]     [phone] ++HAS VIRTUAL SUPPORT++   Gretchen Looney DO, MPH, ABOIM, IBCLC  Integrative Family Medicine Physician/Breastfeeding Medicine/ Home visits  www.UB.  [phone]  [facility] \"Well Fed\" postpartum group ([facility])   [phone]  Support in other languages:  Brazilian:  Nini (IBCLC/ Brazilian) [phone]  stacy@[facility].  La Leche League: Si quieres ayuda en espanol con xiang pecho por favor llama Sena al [phone].  Theresa Morfin [facility] & [place]  For more information call [facility] [phone] or Gina at [phone] ([place]) or Courtney Gage at [phone] ([place]).  [place]: , 10:30 am to noon. [facility] [place] [address]: , 1:00-2:00 PM. [facility], [address] [place] [address] (Hmong) [phone]  [employer] ([place]) [phone]   breastfeeding support:  [facility] ([place])     www.[facility].[employer]  [phone]  Chocolate Milk Club:    http://www.TrovaliselsmidwiferSecureKey Technologies.com/chocolate-milk-club/  Dr. Ольга Kerr, ([phone] " "[phone]  DIVA Moms (Dynamic Involved Valued  Moms)   [phone]  [employer] BirthInRiver  [phone] or Kids Movieirthworks@WritePath.[facility]  https://www.GetIntent.com/Blackfamiliesdobreastfeed  [place] Breastfeeding Coalition:  See Facebook site  [email or web address].com Beth Ryan [phone]  [facility] Breastfeeding [place]      See Facebook site or Google \"Momo Rodriguezgabriele\"  [email or web address].[email or web address]  Tye Perales [phone]   Dorothea Hu, [email or web address]       Telephone and Online Support    [facility] ++HAS VIRTUAL SUPPORT++ (call for eligibility information)   [phone]    BabyCafes (www.babycafeusa.org) (now in person)    La Leche [facility]   ++HAS VIRTUAL SUPPORT++  www.li.org  2-109-2-LA-LECHE ([phone])  Local referral line [phone]  Si quieres ayuda en espanol con xiang pecho por favor llBerkeley Sena pearce [phone].    KareyMom-- up to date lactation information  Www.[place].[facility]    International Breastfeeding [place] (Stephen Foster)  Http://ibconline.ca/    The InfantRisk Call Center is available to answer questions about the use of medications during pregnancy and while breastfeeding  [phone]  www.infantSquare.[facility]     Office on Women's Health National Breastfeeding Help Line  8am to 5pm, English and Korean [phone] option 1    https://www.womenshealth.gov/breastfeeding/ Fwwg4Fvgc Tamanna (free on iPhone tamanna store or Google Play)    LactMed Tamanna (free on [employer] tamanna store or Google Play) LactMed is available online at https://toxnet.nlm.nih.gov/newtoxnet/lactmed.htm and is now available on your mobile device. The free LactMed Tamanna for iPhone/Jammin Java Touch and Android can be downloaded at http://toxnet.nlm.nih.gov/help/lactmedapp.htm.               "

## 2023-11-29 NOTE — PLAN OF CARE
Patient VSS. Patient has been emotional today. Patient is concerned about discharging today and baby possibly coming home earlier. Patient was assured [facility] staff and management was working with NICU management and staff to make a plan.   Provider came to see patient and discharged patient.   Plan now is for patient to room in with infant until infant discharges. Patient will be in the room only.   Patient given discharge medications, patient denies any further questions about thise.  Patient will be given some supplies for stay.

## 2023-11-29 NOTE — PROGRESS NOTES
Post Partum Progress Note    Subjective:  Resting in bed. Has been ambulating without dizziness. Passed significant amounts of flatus and small bowel movement yesterday, bloating and burping has since improved. Heartburn improved with pepcid. Shortness of breath improved throughout the day yesterday after lasix. Pain from surgery overall feeling improved. She is tolerating PO intake though has self-limited due to nausea and prior bloating discomfort; had mashed potatoes for dinner. No chest pain. Lochia present and minimal. Voiding. No headache or blurry vision. She denies RUQ pain.  Pumping for baby in NICU.     Objective:  Vitals:    23 2100 23 0113 23 0524 23 0537   BP: 132/82 136/80 (!) 132/95    BP Location:       Patient Position:       Cuff Size:       Pulse:       Resp: 18 18 16    Temp: 98.3  F (36.8  C) 98.1  F (36.7  C) 97.6  F (36.4  C)    TempSrc: Oral Oral Oral    SpO2:       Weight:    88.7 kg (195 lb 8.8 oz)   Height:           General: NAD. A&Ox3.  CV: Regular rate, normal rhythm; well perfused.   Pulm: Normal work of breathing  Abd: Soft, non-tender, moderately distended and tympanic. Fundus is firm and at the umbilicus.    Incision: incision is clean, dry, intact, overlying steri strips in place.   Ext: 2+ lower extremity edema bilaterally. No calf tenderness.    Weights:   Date/Time Weight   23 0831 92 kg (202 lb 13.2 oz)   23 0624 93 kg (205 lb 0.4 oz)   23 1500 97.3 kg (214 lb 8 oz)       Assessment/Plan:  38 year old  POD#4 s/p PLTCS at 38w5d  for Cat II FHT and arrest of descent on, s/p 24 hours of magnesium for seizure prophylaxis in the setting of pre-eclampsia with severe features by blood pressure criteria.     #Postpartum care  - Encourage routine post-operative goals including ambulation and incentive spirometry  - PNC: Rh negative, baby negative, Rhogam not indicated.  - Rubella immune. No intervention indicated.  - Pain: improved on PO  medications  - Abdominal distention: improved with return of bowel function  - Heme: Hgb 13.6 > QBL 1190 ( L O'leary, TXA)> 10.9 > 9.9. She is asx from an ABLA.   - GI: continue anti-emetics and stool softeners as needed.  - : s/p felipe, voiding spontaneously  - Pumping for baby in NICU   - BC: Eventually vasectomy. Likely IUD @ 6wk > vasc.  - Started lovenox prophylaxis     # Shortness of breath, resolved  S/p lasix. CXR  wnl.     # COVID   -isolation precautions.    # Palpitations, resolved  - EKG nl (reviewed with ED provider).  AM electrolytes and TSH wnl    #Preeclampsia with severe features (BP)  - s/p 24 hours magnesium   - HELLP labs normal   - IV hypertensives prn for sustained severe range BPs   - Daily weights + strict I/Os, starting to diurese well with appropriately downtrending weights   - D#5 nifedipine 90, will uptitrate prn. One LMR blood pressure this morning.    Dispo: pending blood pressure, likely later today    Lynn Longo MD  Obstetrics & Gynecology, PGY-2  2023 8:12 AM          Physician Attestation   I personally examined and evaluated this patient.  I discussed the patient with the resident/fellow and care team, and agree with the assessment and plan of care as documented in the note.     Arceo findings: Kelly Wakefield is a 38 year old  on POD 4 s/p PLTCS at 38w5d under general anesthesia. Pregnancy complicated by pre-eclampsia with severe features, COVID positive, acute blood loss anemia secondary to intra-operative postpartum hemorrhage. Doing well and meeting discharge criteria. BPs stable on nifedipine XL 90mg daily. Infant in NICU. Reviewed discharge instructions including activity restrictions, pelvic rest and follow up plan. Reviewed signs of excessive bleeding, infection, postpartum pre-eclampsia, mastitis, DVT and postpartum depression - instructed patient to call if concerned about any of these or other concerns. Patient to follow up in 1 week  for BP check, 6 weeks for routine postpartum visit. All questions answered.      Vitals:    11/29/23 0113 11/29/23 0524 11/29/23 0537 11/29/23 1005   BP: 136/80 (!) 132/95  126/74   BP Location:       Patient Position:       Cuff Size:       Pulse:       Resp: 18 16  16   Temp: 98.1  F (36.7  C) 97.6  F (36.4  C)  97.8  F (36.6  C)   TempSrc: Oral Oral  Oral   SpO2:       Weight:   88.7 kg (195 lb 8.8 oz)    Height:           Please see A&P for additional details of medical decision making.    Gila Chauhan MD  Date of Service (when I saw the patient): 11/29/23

## 2023-11-29 NOTE — LACTATION NOTE
This note was copied from a baby's chart.  Lactation Follow Up Note    Reason for visit/ call:  Supply check/ initial anticipatory discharge teaching (mom might be going home today, but baby is staying and she is still in quarantine)    Supply:  Has been pumping 10-20 ml every 3 hours day and night    Significant changes (medications, equipment, comfort, etc):  Breasts feeling gonzalez  Baby bottling well, going for an MRI tonight    Skin to skin/ nuzzling/ latching:   from baby   Mom's goal is breastfeeding    Education given:  [facility] breast milk storage  Resources in the community  Maintain setting  Time sensitive nature of building supply (vs learning to latch)... babies have time to learn to latch and we will make sure she has resources to assist  Symphony use at home    Plan:  Take home rental pump  Keep working on supply  Reach out to outpatient home visiting lactation if needed  Need a plan when things are more concrete of whether baby is going or staying  Did not review feeding log (mom overwhelmed with teaching)... need to revisit this    Marisela Joseph, RNC-MAIDA, IBCLC   Lactation Consultant  Ascom: *[phone]  Office: [phone]

## 2023-11-29 NOTE — PLAN OF CARE
Pt is on Isol precaution, +COVID. VSS on RA. Clear LS. Denies SOB. Denies headache, no RUQ pain, reflexes +2, no clonus. Up ad duyen. Fundus firm at 1 cm below umbilicus. Lochia scant. Voids spontaneously. Passing flatus, denies nausea. Incisional pain managed with cold application, Tylenol and Ibuprofen. Incision EARLENE, derma bonded, c/d/i. Breast pumping done independently for infant in NICU. Continue plan of care.    Vitals:    11/28/23 2100 11/29/23 0113 11/29/23 0524 11/29/23 0537   BP: 132/82 136/80 (!) 132/95    BP Location:       Patient Position:       Cuff Size:       Pulse:       Resp: 18 18 16    Temp: 98.3  F (36.8  C) 98.1  F (36.7  C) 97.6  F (36.4  C)    TempSrc: Oral Oral Oral    SpO2:       Weight:    88.7 kg (195 lb 8.8 oz)   Height:

## 2023-11-30 ENCOUNTER — TELEPHONE (OUTPATIENT)
Dept: MATERNAL FETAL MEDICINE | Facility: CLINIC | Age: 38
End: 2023-11-30
Payer: COMMERCIAL

## 2023-11-30 ENCOUNTER — LACTATION ENCOUNTER (OUTPATIENT)
Age: 38
End: 2023-11-30

## 2023-11-30 NOTE — LACTATION NOTE
This note was copied from a baby's chart.  Lactation visit:  Assisted Kelly at bedside with infant, she is Covid-19 positive and this is the first time she has gotten to hold her infant!  She is pumping with Symphony and expressing 20 mL    Infant able to latch in cross-cradle and fed on each breast 30+ minutes (longer on left side, brief on right side). Weighted feeding showed her infant moved 16 mL while at the breast. (Had also fed prior to weighted feeding for brief time per Kelly). This was his first feeding and he did wonderful!  Deep latch, nutritive sucking pattern. Good positioning with infant. Skin-to-skin as often as possible.    Plan:  Kelly wants to breastfeed:  feed every 2 - 3 hours throughout night  Offer bottle as needed for supplementation if seeming hungry after feeding, goal is to complete entire feeding in 30 minutes per staff RN (this includes time at breast in addition to the time to take bottle).  Infant   -pump after feeding at breast whenever a supplementation is offered or used.  -pump if giving a bottle in place of feeding at the breast    -lactation to follow in morning!    (Reviewed stools / wets with Kelly)    Rashmi Ramirez, RN, IBCLC on 11/29/2023 at 11:42 PM

## 2023-11-30 NOTE — LACTATION NOTE
"This note was copied from a baby's chart.  Lactation Follow Up Note    Reason for visit/ call:  Going home; working on latch    Supply:  40-50 ml per pumping, covering baby's needs  Discussed optimal timing of pumping (and why that's difficult in the hospital)    Skin to skin/ nuzzling/ latching:  Will call for latch assist with next feeding  Discussed strategies to try to keep babe calm and catch them at early feeding cues (extended skin to skin, leaving pumped milk on counter to top off ASAP, \"appetizer\" bottle to decrease frantic hunger before latching, etc)    Plan:  Return for latch assist as needed, or if any other questions    Marisela Joseph, RNC-MAIDA, IBCLC   Lactation Consultant  Ascom: *[phone]  Office: [phone]    "

## 2023-12-08 ENCOUNTER — OFFICE VISIT (OUTPATIENT)
Dept: MIDWIFE SERVICES | Facility: CLINIC | Age: 38
End: 2023-12-08
Payer: COMMERCIAL

## 2023-12-08 VITALS
WEIGHT: 189 LBS | HEART RATE: 90 BPM | DIASTOLIC BLOOD PRESSURE: 80 MMHG | BODY MASS INDEX: 31.45 KG/M2 | TEMPERATURE: 99.4 F | SYSTOLIC BLOOD PRESSURE: 127 MMHG | OXYGEN SATURATION: 97 %

## 2023-12-08 DIAGNOSIS — D62 ANEMIA DUE TO BLOOD LOSS, ACUTE: ICD-10-CM

## 2023-12-08 DIAGNOSIS — F41.9 ANXIETY: ICD-10-CM

## 2023-12-08 DIAGNOSIS — R10.2 VAGINAL PAIN: ICD-10-CM

## 2023-12-08 DIAGNOSIS — O13.9 GESTATIONAL HYPERTENSION, ANTEPARTUM: Primary | ICD-10-CM

## 2023-12-08 DIAGNOSIS — R30.0 DYSURIA: ICD-10-CM

## 2023-12-08 PROBLEM — D75.839 THROMBOCYTHEMIA: Status: ACTIVE | Noted: 2023-12-08

## 2023-12-08 LAB
ALBUMIN UR-MCNC: NEGATIVE MG/DL
APPEARANCE UR: CLEAR
BACTERIA #/AREA URNS HPF: NORMAL /HPF
BILIRUB UR QL STRIP: NEGATIVE
CLUE CELLS: ABNORMAL
COLOR UR AUTO: YELLOW
ERYTHROCYTE [DISTWIDTH] IN BLOOD BY AUTOMATED COUNT: 12.7 % (ref 10–15)
GLUCOSE UR STRIP-MCNC: NEGATIVE MG/DL
HCT VFR BLD AUTO: 32.4 % (ref 35–47)
HGB BLD-MCNC: 10.5 G/DL (ref 11.7–15.7)
HGB UR QL STRIP: ABNORMAL
KETONES UR STRIP-MCNC: NEGATIVE MG/DL
LEUKOCYTE ESTERASE UR QL STRIP: NEGATIVE
MCH RBC QN AUTO: 31.3 PG (ref 26.5–33)
MCHC RBC AUTO-ENTMCNC: 32.4 G/DL (ref 31.5–36.5)
MCV RBC AUTO: 96 FL (ref 78–100)
NITRATE UR QL: NEGATIVE
PH UR STRIP: 6 [PH] (ref 5–7)
PLATELET # BLD AUTO: 547 10E3/UL (ref 150–450)
RBC # BLD AUTO: 3.36 10E6/UL (ref 3.8–5.2)
RBC #/AREA URNS AUTO: NORMAL /HPF
SP GR UR STRIP: <=1.005 (ref 1–1.03)
TRICHOMONAS, WET PREP: ABNORMAL
UROBILINOGEN UR STRIP-ACNC: 0.2 E.U./DL
WBC # BLD AUTO: 8.2 10E3/UL (ref 4–11)
WBC #/AREA URNS AUTO: NORMAL /HPF
WBC'S/HIGH POWER FIELD, WET PREP: ABNORMAL
YEAST, WET PREP: ABNORMAL

## 2023-12-08 PROCEDURE — 36415 COLL VENOUS BLD VENIPUNCTURE: CPT | Performed by: ADVANCED PRACTICE MIDWIFE

## 2023-12-08 PROCEDURE — 81001 URINALYSIS AUTO W/SCOPE: CPT | Performed by: ADVANCED PRACTICE MIDWIFE

## 2023-12-08 PROCEDURE — 99214 OFFICE O/P EST MOD 30 MIN: CPT | Mod: 24 | Performed by: ADVANCED PRACTICE MIDWIFE

## 2023-12-08 PROCEDURE — 87210 SMEAR WET MOUNT SALINE/INK: CPT | Performed by: ADVANCED PRACTICE MIDWIFE

## 2023-12-08 PROCEDURE — 85027 COMPLETE CBC AUTOMATED: CPT | Performed by: ADVANCED PRACTICE MIDWIFE

## 2023-12-08 RX ORDER — NIFEDIPINE 90 MG/1
90 TABLET, FILM COATED, EXTENDED RELEASE ORAL DAILY
Qty: 60 TABLET | Refills: 0 | Status: SHIPPED | OUTPATIENT
Start: 2023-12-08 | End: 2024-02-26

## 2023-12-08 RX ORDER — PRENATAL VIT/IRON FUM/FOLIC AC 27MG-0.8MG
1 TABLET ORAL DAILY
Qty: 90 TABLET | Refills: 3 | Status: SHIPPED | OUTPATIENT
Start: 2023-12-08 | End: 2024-09-26

## 2023-12-08 NOTE — PROGRESS NOTES
SUBJECTIVE:   Kelly Wakefield is here for her 6-week postpartum checkup. She is now     HPI: Pt here for BP and mood check   C/O vaginal pain vs dysuria   C/O constipation  Enrolled in the [place] BP program and checking in with the RN team daily      DELIVERY DATE: 23  c/s for abnormal FHT of a viable boy, weight 6 pounds 11 oz., with the following complications   - Symptomatic covid during admission, on paxlovid  - Cat 2 FHT tracing, minimal progress in 2nd stage and c/s  - Postpartum hemorrhage and acute blood loss anemia  - Preeclampsia with severe features    FEEDING METHOD:pumping and feeding, met with lactation yesterday     EXAM:  /80   Pulse 90   Temp 99.4  F (37.4  C) (Oral)   Wt 85.7 kg (189 lb)   LMP 2023   SpO2 97%   BMI 31.45 kg/m      GENERAL APPEARANCE: healthy, alert and no distress, ,ABDOMEN: soft, nontender, diastasis recti closing, incision: steri strips in place, no discharge or drainage noted, incision appears a thin red, well approximated red line, no areas of erythema or edema noted.  ,PSYCH: mentation appears normal and affect normal/bright    PELVIC EXAM:  Vulva: BUS WNL, no lesions noted,   Vagina:  no lesions, well rugated, good tone,   Wet prep obtained, light pink discharge noted on q-tip       ASSESSMENT:   Normal postpartum exam after c/s for abnormal FHT.    PLAN:    (O13.9) Gestational hypertension, antepartum  (primary encounter diagnosis)  Comment:   Plan: Prenatal Vit-Fe Fumarate-FA (PRENATAL         MULTIVITAMIN W/IRON) 27-0.8 MG tablet,         NIFEdipine ER (ADALAT CC) 90 MG 24 hr tablet            (F41.9) Anxiety  Comment: In exacerbation   Plan: sertraline (ZOLOFT) 50 MG tablet        Discussed mood, at high risk for depression/anxiety, still processing her hospital experience and teary when talking about it.  Discussed warning s/s to look for and increased zoloft to 50 mg     (R10.2) Vaginal pain  Comment:   Plan: Wet prep - Clinic Collect             (R30.0) Dysuria  Comment:   Plan: UA with Microscopic reflex to Culture - lab         Collect    (D62) Anemia due to blood loss, acute  Comment: hgb in hospital after birth was 9.9   Plan: CBC with platelets                  Pt plans IUD insertion at 8 wks.  Discussed if depression/anxiety worsens, or pt has any concerns to call and be seen sooner       Birth Control as ordered. Fertility reviewed.    Return as needed or at time interval for next routine pap, pelvic, or breast exam.  Encourage Kegals and abdominal exercise. Slow, steady weight loss.  Continue a multi vitamin supplement, especially if breastfeeding.    Swati Proctor CNM

## 2023-12-11 PROBLEM — N81.89 PELVIC FLOOR WEAKNESS: Status: RESOLVED | Noted: 2023-09-25 | Resolved: 2023-12-11

## 2023-12-11 NOTE — PROGRESS NOTES
10/06/23 0500   Appointment Info   Signing clinician's name / credentials Su Mckeon, PT, DPT   Total/Authorized Visits E+T   Visits Used 2   Medical Diagnosis pelvic floor weakness   PT Tx Diagnosis pelvic floor dysfunction   Other pertinent information due date is dec 4 - 31 weeks pregnant   Progress Note/Certification   Onset of illness/injury or Date of Surgery 09/04/23   Therapy Frequency 1x every 2 weeks for 10 weeks   Predicted Duration 10 weeks   Progress Note Due Date 12/04/23   Progress Note Completed Date 09/25/23   PT Goal 1   Goal Identifier self management   Goal Description patient will verbalize/demonstrate pro-active self-care measures to improve pelvic floor function including bladder/bowel hygiene; activities to reduce stress and anxiety; and prescribed exercises for pelvic floor function with the assistance of educational materials from physical therapy sessions.   Rationale to maximize safety and independence with performance of ADLs and functional tasks   Target Date 11/06/23   PT Goal 2   Goal Identifier voiding dysfunction   Goal Description the patient will improve urinary voiding function by increasing their ability to fully empty their bladder during each void, using void techniques if needed, measured by decrease in post-void dribbling and frequency.   Rationale to maximize safety and independence with performance of ADLs and functional tasks   Target Date 12/04/23   Goal Progress only had leaking once after going to the bathroom becuase she was in a hurry   PT Goal 3   Goal Identifier constipation   Goal Description pt will have decreased constipation demonstrated through daily bowel movement with no straining or difficulty with elimination   Rationale to maximize safety and independence with performance of ADLs and functional tasks   Target Date 12/04/23   Goal Progress 3 bowel movements in last week, getting worse with pregnancy. taking 2 psyllium husk tablets/day   Subjective  Report   Subjective Report pt only leaked once and waited to go to the bathroom instead of going at the first urge she had.   Objective Measures   Objective Measures Objective Measure 1   Objective Measure 1   Objective Measure bladder diary   Details pt voiding 7-9x/day, drinking about 120oz/day primarily water, goes when has urge   Treatment Interventions (PT)   Interventions Therapeutic Activity   Therapeutic Activity   Therapeutic Activities: dynamic activities to improve functional performance minutes (11653) 30   Therapeutic Activities Ther Act 2;Ther Act 3;Ther Act 4   Ther Act 1 Bladder retraining   Ther Act 1 - Details reviewed of bladder diary with pt and educated on normal voiding. provided education in concepts of training bladder: goals are to not leak, challenge urges but stay successful, and keep overall system in control, may try voiding schedule to start and may suggest behavioral modification or dietary changes   Skilled Intervention education on urge suppression and elimination techniques   Patient Response/Progress pt demonstrated understanding, no pain or adverse effects   Ther Act 2 Urge Suppression Techniques   Ther Act 2 - Details education on strategies to manage and control urges or impulses to prevent/reduce leakage. 1.  Education about the nature of urges. 2. recognize the specific triggers that can lead to unwanted movements or behaviors. 3. Deep breathing exercises can help reduce stress/anxiety, which can in turn help suppress urges. Practice diaphragmatic breathing and learn how to incorporate it into daily routine. 4. Distraction techniques: Divert attention away from the urge or impulse. This can include mentally stimulating activities, such as puzzles or reading, or physically engaging in activities that require focus, such as squeezing a stress ball or doing exercises. 5. Relaxation techniques: progressive muscle relaxation or guided imagery, to help reduce tension and promote a  sense of calmness. These techniques can be helpful in managing and suppressing urges. 6. Cognitive strategies: identify and challenge any negative or irrational thoughts associated with their urges. Reframe thoughts and develop more positive and adaptive thinking patterns. 7. Gradual exposure and response prevention: gradually expose yourself triggers or situations that typically elicit urges, while simultaneously practicing strategies to resist or suppress the urge. This can help build resilience and develop better control over impulses.   Ther Act 3 Quick Flicks   Ther Act 3 - Details instruction on performing 5 quick contractions of the pelvic floor muscle to reduce urge. Try different positions, then with movement so you can use when you have urge and making your way to the bathroom. education about pevlic floor being antagonistic to bladder   PTRx Ther Act 1 Education Sheet Pelvis   PTRx Ther Act 1 - Details reviewed voiding strategies and added in strong pelvic floor contraction at end of urination   PTRx Ther Act 2 Education Sheet General   PTRx Ther Act 2 - Details common mistakes leading to constipation   PTRx Ther Act 3 Toileting Position   PTRx Ther Act 3 - Details squatty potty and leaning forwards   PTRx Ther Act 4 Pregnancy Labor Positioning Stage 1 Turtle   PTRx Ther Act 4 - Details to relax pelvic floor for constipation and preparing for labor and delivery   Education   Learner/Method Patient   Plan   Home program ptrx printout   Plan for next session perineal techniques for relaxation and preparing for birth and labor positions   Comments   Comments pt arrived 10 minutes late   Total Session Time   Timed Code Treatment Minutes 30   Total Treatment Time (sum of timed and untimed services) 30           DISCHARGE  Reason for Discharge: Patient has failed to schedule further appointments.  Change in medical status.    Equipment Issued: HEP    Discharge Plan: Patient to continue home  program.    Referring Provider:  Layne Sanders

## 2024-01-08 LAB
PATH REPORT.COMMENTS IMP SPEC: NORMAL
PATH REPORT.COMMENTS IMP SPEC: NORMAL
PATH REPORT.FINAL DX SPEC: NORMAL
PATH REPORT.GROSS SPEC: NORMAL
PATH REPORT.MICROSCOPIC SPEC OTHER STN: NORMAL
PATH REPORT.RELEVANT HX SPEC: NORMAL
PHOTO IMAGE: NORMAL

## 2024-01-16 ENCOUNTER — DOCUMENTATION ONLY (OUTPATIENT)
Dept: MATERNAL FETAL MEDICINE | Facility: CLINIC | Age: 39
End: 2024-01-16
Payer: COMMERCIAL

## 2024-01-22 ENCOUNTER — PRENATAL OFFICE VISIT (OUTPATIENT)
Dept: MIDWIFE SERVICES | Facility: CLINIC | Age: 39
End: 2024-01-22
Payer: COMMERCIAL

## 2024-01-22 VITALS
HEART RATE: 76 BPM | SYSTOLIC BLOOD PRESSURE: 121 MMHG | OXYGEN SATURATION: 98 % | WEIGHT: 171 LBS | BODY MASS INDEX: 28.46 KG/M2 | DIASTOLIC BLOOD PRESSURE: 71 MMHG

## 2024-01-22 DIAGNOSIS — Z30.011 ENCOUNTER FOR BCP (BIRTH CONTROL PILLS) INITIAL PRESCRIPTION: ICD-10-CM

## 2024-01-22 DIAGNOSIS — R79.89 ELEVATED PLATELET COUNT: ICD-10-CM

## 2024-01-22 DIAGNOSIS — Z12.4 SCREENING FOR CERVICAL CANCER: Primary | ICD-10-CM

## 2024-01-22 LAB
ERYTHROCYTE [DISTWIDTH] IN BLOOD BY AUTOMATED COUNT: 11.8 % (ref 10–15)
HCT VFR BLD AUTO: 37.9 % (ref 35–47)
HGB BLD-MCNC: 12.2 G/DL (ref 11.7–15.7)
MCH RBC QN AUTO: 30.1 PG (ref 26.5–33)
MCHC RBC AUTO-ENTMCNC: 32.2 G/DL (ref 31.5–36.5)
MCV RBC AUTO: 94 FL (ref 78–100)
PLATELET # BLD AUTO: 274 10E3/UL (ref 150–450)
RBC # BLD AUTO: 4.05 10E6/UL (ref 3.8–5.2)
WBC # BLD AUTO: 6 10E3/UL (ref 4–11)

## 2024-01-22 PROCEDURE — 36415 COLL VENOUS BLD VENIPUNCTURE: CPT | Performed by: ADVANCED PRACTICE MIDWIFE

## 2024-01-22 PROCEDURE — G0145 SCR C/V CYTO,THINLAYER,RESCR: HCPCS | Performed by: ADVANCED PRACTICE MIDWIFE

## 2024-01-22 PROCEDURE — 87624 HPV HI-RISK TYP POOLED RSLT: CPT | Performed by: ADVANCED PRACTICE MIDWIFE

## 2024-01-22 PROCEDURE — 99213 OFFICE O/P EST LOW 20 MIN: CPT | Performed by: ADVANCED PRACTICE MIDWIFE

## 2024-01-22 PROCEDURE — 85027 COMPLETE CBC AUTOMATED: CPT | Performed by: ADVANCED PRACTICE MIDWIFE

## 2024-01-22 RX ORDER — ACETAMINOPHEN AND CODEINE PHOSPHATE 120; 12 MG/5ML; MG/5ML
0.35 SOLUTION ORAL DAILY
Qty: 90 TABLET | Refills: 3 | Status: SHIPPED | OUTPATIENT
Start: 2024-01-22 | End: 2024-03-21

## 2024-01-22 ASSESSMENT — PATIENT HEALTH QUESTIONNAIRE - PHQ9: SUM OF ALL RESPONSES TO PHQ QUESTIONS 1-9: 2

## 2024-01-22 NOTE — PROGRESS NOTES
S:  Kelly is here to discuss birth control. She is 8 weeks postpartum and was initially considering IUD. Her  is planning vasectomy as they do not want other children. Discussed options for birth control including mini pill, Paragard IUD, Mirena IUD, Nexplanon, Depo. She would like POP/mini pill.     Kelly has been taking nifedipine 90mg daily due to preeclampsia with severe features. BP is normal today. She is following with [facility] program and has appointment scheduled with family practice to discuss ongoing BP monitoring and management.    Platelets were elevated at 6w postpartum visit, will redraw today.    O:  /71   Pulse 76   Wt 77.6 kg (171 lb)   LMP 02/27/2023   SpO2 98%   Breastfeeding Yes   BMI 28.46 kg/m    Pelvic Exam:  Vulva: No external lesions, normal hair distribution, no adenopathy  Vagina: Moist, pink, no abnormal discharge, well rugated, no lesions  Cervix: Pap smear is taken, parous, smooth, pink, no visible lesions    A/P:  (Z12.4) Screening for cervical cancer  (primary encounter diagnosis)  Plan: Pap imaged thin layer screen with HPV -         recommended age 30 - 65 years (select HPV order        below)    (Z30.011) Encounter for BCP (birth control pills) initial prescription  Comment: Plan mini pill until 3  months after vasectomy. Reviewed timing to take every 24h  Plan: norethindrone (MICRONOR) 0.35 MG tablet      (R79.89) Elevated platelet count  Plan: CBC with platelets

## 2024-01-24 LAB
BKR LAB AP GYN ADEQUACY: NORMAL
BKR LAB AP GYN INTERPRETATION: NORMAL
BKR LAB AP HPV REFLEX: NORMAL
BKR LAB AP PREVIOUS ABNORMAL: NORMAL
PATH REPORT.COMMENTS IMP SPEC: NORMAL
PATH REPORT.COMMENTS IMP SPEC: NORMAL
PATH REPORT.RELEVANT HX SPEC: NORMAL

## 2024-01-25 LAB
HUMAN PAPILLOMA VIRUS 16 DNA: NEGATIVE
HUMAN PAPILLOMA VIRUS 18 DNA: NEGATIVE
HUMAN PAPILLOMA VIRUS FINAL DIAGNOSIS: NORMAL
HUMAN PAPILLOMA VIRUS OTHER HR: NEGATIVE

## 2024-01-26 PROBLEM — R87.810 CERVICAL HIGH RISK HPV (HUMAN PAPILLOMAVIRUS) TEST POSITIVE: Status: ACTIVE | Noted: 2024-01-26

## 2024-02-24 DIAGNOSIS — O13.9 GESTATIONAL HYPERTENSION, ANTEPARTUM: ICD-10-CM

## 2024-02-26 RX ORDER — NIFEDIPINE 90 MG/1
TABLET, FILM COATED, EXTENDED RELEASE ORAL
Qty: 90 TABLET | Refills: 3 | Status: SHIPPED | OUTPATIENT
Start: 2024-02-26 | End: 2024-03-21

## 2024-03-14 SDOH — HEALTH STABILITY: PHYSICAL HEALTH: ON AVERAGE, HOW MANY DAYS PER WEEK DO YOU ENGAGE IN MODERATE TO STRENUOUS EXERCISE (LIKE A BRISK WALK)?: 2 DAYS

## 2024-03-14 SDOH — HEALTH STABILITY: PHYSICAL HEALTH: ON AVERAGE, HOW MANY MINUTES DO YOU ENGAGE IN EXERCISE AT THIS LEVEL?: 30 MIN

## 2024-03-14 ASSESSMENT — SOCIAL DETERMINANTS OF HEALTH (SDOH): HOW OFTEN DO YOU GET TOGETHER WITH FRIENDS OR RELATIVES?: ONCE A WEEK

## 2024-03-21 ENCOUNTER — OFFICE VISIT (OUTPATIENT)
Dept: FAMILY MEDICINE | Facility: CLINIC | Age: 39
End: 2024-03-21
Payer: COMMERCIAL

## 2024-03-21 VITALS
TEMPERATURE: 98.7 F | OXYGEN SATURATION: 99 % | DIASTOLIC BLOOD PRESSURE: 81 MMHG | HEIGHT: 65 IN | SYSTOLIC BLOOD PRESSURE: 119 MMHG | HEART RATE: 70 BPM | WEIGHT: 158 LBS | BODY MASS INDEX: 26.33 KG/M2

## 2024-03-21 DIAGNOSIS — Z13.21 ENCOUNTER FOR VITAMIN DEFICIENCY SCREENING: ICD-10-CM

## 2024-03-21 DIAGNOSIS — Z00.00 ROUTINE GENERAL MEDICAL EXAMINATION AT A HEALTH CARE FACILITY: Primary | ICD-10-CM

## 2024-03-21 DIAGNOSIS — F41.9 ANXIETY: ICD-10-CM

## 2024-03-21 DIAGNOSIS — F33.0 MILD RECURRENT MAJOR DEPRESSION (H): ICD-10-CM

## 2024-03-21 PROCEDURE — 82306 VITAMIN D 25 HYDROXY: CPT | Performed by: STUDENT IN AN ORGANIZED HEALTH CARE EDUCATION/TRAINING PROGRAM

## 2024-03-21 PROCEDURE — 99214 OFFICE O/P EST MOD 30 MIN: CPT | Mod: 25 | Performed by: STUDENT IN AN ORGANIZED HEALTH CARE EDUCATION/TRAINING PROGRAM

## 2024-03-21 PROCEDURE — 80053 COMPREHEN METABOLIC PANEL: CPT | Performed by: STUDENT IN AN ORGANIZED HEALTH CARE EDUCATION/TRAINING PROGRAM

## 2024-03-21 PROCEDURE — 36415 COLL VENOUS BLD VENIPUNCTURE: CPT | Performed by: STUDENT IN AN ORGANIZED HEALTH CARE EDUCATION/TRAINING PROGRAM

## 2024-03-21 PROCEDURE — 99395 PREV VISIT EST AGE 18-39: CPT | Performed by: STUDENT IN AN ORGANIZED HEALTH CARE EDUCATION/TRAINING PROGRAM

## 2024-03-21 RX ORDER — VENLAFAXINE HYDROCHLORIDE 37.5 MG/1
37.5 CAPSULE, EXTENDED RELEASE ORAL DAILY
Qty: 30 CAPSULE | Refills: 0 | Status: SHIPPED | OUTPATIENT
Start: 2024-03-21 | End: 2024-04-18

## 2024-03-21 RX ORDER — DIAPER,BRIEF,ADULT, DISPOSABLE
1200 EACH MISCELLANEOUS DAILY
COMMUNITY
Start: 2024-02-05 | End: 2024-09-26

## 2024-03-21 RX ORDER — ERGOCALCIFEROL (VITAMIN D2) 10 MCG
400 TABLET ORAL DAILY
COMMUNITY
Start: 2023-12-11

## 2024-03-21 RX ORDER — SERTRALINE HYDROCHLORIDE 25 MG/1
25 TABLET, FILM COATED ORAL DAILY
Qty: 30 TABLET | Refills: 0 | Status: SHIPPED | OUTPATIENT
Start: 2024-03-21 | End: 2024-04-18

## 2024-03-21 ASSESSMENT — PAIN SCALES - GENERAL: PAINLEVEL: NO PAIN (0)

## 2024-03-21 NOTE — PROGRESS NOTES
"Preventive Care Visit  [facility]  TREVOR GREGORY MD, Family Medicine  Mar 21, 2024      Assessment & Plan     (Z00.00) Routine general medical examination at a health care facility  (primary encounter diagnosis)  Comment: Stable  Plan: Comprehensive metabolic panel (BMP + Alb, Alk         Phos, ALT, AST, Total. Bili, TP)            (F41.9) Anxiety  Comment: Chronic, mildly uncontrolled.  Patient evident with side effect of low libido likely secondary to SSRI.  Did discuss options for alternative and patient in agreement to consider possibly titrating down sertraline to 25 mg for 1 week and then starting on Effexor.  I have allowed the patient to make this decision for herself and will schedule follow-up to discuss if patient proceeded with medication change or if she continued with the same dose of the 50 mg of sertraline.  Will follow-up during the visit.  Plan: sertraline (ZOLOFT) 25 MG tablet, venlafaxine         (EFFEXOR XR) 37.5 MG 24 hr capsule            (F33.0) Mild recurrent major depression (H24)  Comment: Chronic, uncontrolled.  See above  Plan: sertraline (ZOLOFT) 25 MG tablet, venlafaxine         (EFFEXOR XR) 37.5 MG 24 hr capsule            (Z13.21) Encounter for vitamin deficiency screening  Comment: Stable  Plan: Vitamin D deficiency screening            Dictation Disclaimer: Some of this Note has been completed with voice-recognition dictation software. Although errors are generally corrected real-time, there is the potential for a rare error to be present in the completed chart.                   BMI  Estimated body mass index is 26.42 kg/m  as calculated from the following:    Height as of this encounter: 1.647 m (5' 4.84\").    Weight as of this encounter: 71.7 kg (158 lb).       Counseling  Appropriate preventive services were discussed with this patient, including applicable screening as appropriate for fall prevention, nutrition, physical activity, " Tobacco-use cessation, weight loss and cognition.  Checklist reviewing preventive services available has been given to the patient.  Reviewed patient's diet, addressing concerns and/or questions.   She is at risk for lack of exercise and has been provided with information to increase physical activity for the benefit of her well-being.   She is at risk for psychosocial distress and has been provided with information to reduce risk.           Tisha Mendoza is a 39 year old, presenting for the following:  Physical        3/21/2024     1:27 PM   Additional Questions   Roomed by cam   Accompanied by none         3/21/2024     1:27 PM   Patient Reported Additional Medications   Patient reports taking the following new medications none        Health Care Directive  Patient does not have a Health Care Directive or Living Will: Discussed advance care planning with patient; information given to patient to review.    HPI  Patient is a 39-year-old female presenting for routine physical and is 4 months postpartum.  Patient states that    She has been learning how to adjust with her new life and being postpartum.  She states that the adjustment has been difficult at times but overall has been enjoying the process.  She states that she is increased her sertraline to 50 mg however has noticed a decrease in her libido and is unsure if it is related to the sertraline or if it is related to postpartum conditions.  Patient states that she is interested in maybe exploring an alternative option for medications for her anxiety and depression in order to improve her libido.            3/14/2024   General Health   How would you rate your overall physical health? Excellent   Feel stress (tense, anxious, or unable to sleep) Only a little   (!) STRESS CONCERN      3/14/2024   Nutrition   Three or more servings of calcium each day? Yes   Diet: Regular (no restrictions)   How many servings of fruit and vegetables per day? 4 or more   How  many sweetened beverages each day? 0-1         3/14/2024   Exercise   Days per week of moderate/strenous exercise 2 days   Average minutes spent exercising at this level 30 min   (!) EXERCISE CONCERN      3/14/2024   Social Factors   Frequency of gathering with friends or relatives Once a week   Worry food won't last until get money to buy more No   Food not last or not have enough money for food? No   Do you have housing?  Yes   Are you worried about losing your housing? No   Lack of transportation? No   Unable to get utilities (heat,electricity)? No         3/14/2024   Dental   Dentist two times every year? Yes         3/14/2024   TB Screening   Were you born outside of the [place]? No         Today's PHQ-9 Score:       2024    10:11 AM   PHQ-9 SCORE   PHQ-9 Total Score 2           3/14/2024   Substance Use   Alcohol more than 3/day or more than 7/wk No   Do you use any other substances recreationally? No     Social History     Tobacco Use    Smoking status: Former     Types: Cigarettes     Quit date: 2/10/2020     Years since quittin.1    Smokeless tobacco: Never   Vaping Use    Vaping Use: Never used   Substance Use Topics    Alcohol use: Not Currently    Drug use: Never             3/14/2024   Breast Cancer Screening   Family history of breast, colon, or ovarian cancer? Yes         3/14/2024   LAST FHS-7 RESULTS   1st degree relative breast or ovarian cancer Yes   Any relative bilateral breast cancer No   Any male have breast cancer No   Any ONE woman have BOTH breast AND ovarian cancer No   Any woman with breast cancer before 50yrs No   2 or more relatives with breast AND/OR ovarian cancer No   2 or more relatives with breast AND/OR bowel cancer No        Mammogram Screening - Patient under 40 years of age: Routine Mammogram Screening not recommended.         3/14/2024   STI Screening   New sexual partner(s) since last STI/HIV test? No     History of abnormal Pap smear: NO - age 30-65 PAP every 5 years  "with negative HPV co-testing recommended        Latest Ref Rng & Units 1/22/2024    10:33 AM   PAP / HPV   PAP  Negative for Intraepithelial Lesion or Malignancy (NILM)    HPV 16 DNA Negative Negative    HPV 18 DNA Negative Negative    Other HR HPV Negative Negative            3/14/2024   Contraception/Family Planning   Questions about contraception or family planning No        Reviewed and updated as needed this visit by Provider                    Lab work is in process     ROS: 10 point ROS neg other than the symptoms noted above in the HPI.       Objective    Exam  /81 (BP Location: Left arm, Patient Position: Sitting, Cuff Size: Adult Regular)   Pulse 70   Temp 98.7  F (37.1  C) (Temporal)   Ht 1.647 m (5' 4.84\")   Wt 71.7 kg (158 lb)   SpO2 99%   Breastfeeding Yes   BMI 26.42 kg/m     Estimated body mass index is 26.42 kg/m  as calculated from the following:    Height as of this encounter: 1.647 m (5' 4.84\").    Weight as of this encounter: 71.7 kg (158 lb).    Physical Exam  GENERAL: alert and no distress  EYES: Eyes grossly normal to inspection, PERRL and conjunctivae and sclerae normal  RESP: lungs clear to auscultation - no rales, rhonchi or wheezes  CV: regular rate and rhythm, normal S1 S2, no S3 or S4, no murmur, click or rub, no peripheral edema  ABDOMEN: soft, nontender, no hepatosplenomegaly, no masses and bowel sounds normal  MS: no gross musculoskeletal defects noted, no edema  SKIN: no suspicious lesions or rashes  NEURO: Normal strength and tone, mentation intact and speech normal  PSYCH: mentation appears normal, affect normal/bright        Signed Electronically by: TREVOR GREGORY MD    "

## 2024-03-21 NOTE — LETTER
My Depression Action Plan  Name: Kelly Wakefield   Date of Birth 1985  Date: 3/21/2024    My doctor: Gretchen Jones   My clinic: [facility]  [address]  [place] [address]  [phone]            GREEN    ZONE   Good Control    What it looks like:   Things are going generally well. You have normal ups and downs. You may even feel depressed from time to time, but bad moods usually last less than a day.   What you need to do:  Continue to care for yourself (see self care plan)  Check your depression survival kit and update it as needed  Follow your physician s recommendations including any medication.  Do not stop taking medication unless you consult with your physician first.             YELLOW         ZONE Getting Worse    What it looks like:   Depression is starting to interfere with your life.   It may be hard to get out of bed; you may be starting to isolate yourself from others.  Symptoms of depression are starting to last most all day and this has happened for several days.   You may have suicidal thoughts but they are not constant.   What you need to do:     Call your care team. Your response to treatment will improve if you keep your care team informed of your progress. Yellow periods are signs an adjustment may need to be made.     Continue your self-care.  Just get dressed and ready for the day.  Don't give yourself time to talk yourself out of it.    Talk to someone in your support network.    Open up your Depression Self-Care Plan/Wellness Kit.             RED    ZONE Medical Alert - Get Help    What it looks like:   Depression is seriously interfering with your life.   You may experience these or other symptoms: You can t get out of bed most days, can t work or engage in other necessary activities, you have trouble taking care of basic hygiene, or basic responsibilities, thoughts of suicide or death that will not go away, self-injurious  behavior.     What you need to do:  Call your care team and request a same-day appointment. If they are not available (weekends or after hours) call your local crisis line, emergency room or 911.          Depression Self-Care Plan / Wellness Kit    Many people find that medication and therapy are helpful treatments for managing depression. In addition, making small changes to your everyday life can help to boost your mood and improve your wellbeing. Below are some tips for you to consider. Be sure to talk with your medical provider and/or behavioral health consultant if your symptoms are worsening or not improving.     Sleep   Sleep hygiene  means all of the habits that support good, restful sleep. It includes maintaining a consistent bedtime and wake time, using your bedroom only for sleeping or sex, and keeping the bedroom dark and free of distractions like a computer, smartphone, or television.     Develop a Healthy Routine  Maintain good hygiene. Get out of bed in the morning, make your bed, brush your teeth, take a shower, and get dressed. Don t spend too much time viewing media that makes you feel stressed. Find time to relax each day.    Exercise  Get some form of exercise every day. This will help reduce pain and release endorphins, the  feel good  chemicals in your brain. It can be as simple as just going for a walk or doing some gardening, anything that will get you moving.      Diet  Strive to eat healthy foods, including fruits and vegetables. Drink plenty of water. Avoid excessive sugar, caffeine, alcohol, and other mood-altering substances.     Stay Connected with Others  Stay in touch with friends and family members.    Manage Your Mood  Try deep breathing, massage therapy, biofeedback, or meditation. Take part in fun activities when you can. Try to find something to smile about each day.     Psychotherapy  Be open to working with a therapist if your provider recommends it.     Medication  Be sure to  take your medication as prescribed. Most anti-depressants need to be taken every day. It usually takes several weeks for medications to work. Not all medicines work for all people. It is important to follow-up with your provider to make sure you have a treatment plan that is working for you. Do not stop your medication abruptly without first discussing it with your provider.    Crisis Resources   These hotlines are for both adults and children. They and are open 24 hours a day, 7 days a week unless noted otherwise.    National Suicide Prevention Lifeline   988 or 2-656-028-AMQG (4871)    Crisis Text Line    www.crisistextline.org  Text HOME to 745881 from anywhere in the [place], anytime, about any type of crisis. A live, trained crisis counselor will receive the text and respond quickly.    [place] Lifeline for LGBTQ Youth  A national crisis intervention and suicide lifeline for LGBTQ youth under 25. Provides a safe place to talk without judgement. Call 1-804.863.7087; text START to 617090 or visit www.thetrevorproject.org to talk to a trained counselor.    For [facility] crisis numbers, visit the [facility] website at:  https://[place].gov/dhs/people-we-serve/adults/health-care/mental-health/resources/crisis-contacts.jsp

## 2024-03-21 NOTE — PATIENT INSTRUCTIONS
PANOXL - every other day  Dermalogica Antioxidant Hydramist Toner - Anti-Aging Toner Spray for Face that helps Firm and Hydrate Skin  Vitamin c or e oils, Kiehl's Clearly Corrective Dark Spot Solution  Preventive Care Advice   This is general advice given by our system to help you stay healthy. However, your care team may have specific advice just for you. Please talk to your care team about your preventive care needs.  Nutrition  Eat 5 or more servings of fruits and vegetables each day.  Try wheat bread, brown rice and whole grain pasta (instead of white bread, rice, and pasta).  Get enough calcium and vitamin D. Check the label on foods and aim for 100% of the RDA (recommended daily allowance).  Lifestyle  Exercise at least 150 minutes each week   (30 minutes a day, 5 days a week).  Do muscle strengthening activities 2 days a week. These help control your weight and prevent disease.  No smoking.  Wear sunscreen to prevent skin cancer.  Have a dental exam and cleaning every 6 months.  Yearly exams  See your health care team every year to talk about:  Any changes in your health.  Any medicines your care team has prescribed.  Preventive care, family planning, and ways to prevent chronic diseases.  Shots (vaccines)   HPV shots (up to age 26), if you've never had them before.  Hepatitis B shots (up to age 59), if you've never had them before.  COVID-19 shot: Get this shot when it's due.  Flu shot: Get a flu shot every year.  Tetanus shot: Get a tetanus shot every 10 years.  Pneumococcal, hepatitis A, and RSV shots: Ask your care team if you need these based on your risk.  Shingles shot (for age 50 and up).  General health tests  Diabetes screening:  Starting at age 35, Get screened for diabetes at least every 3 years.  If you are younger than age 35, ask your care team if you should be screened for diabetes.  Cholesterol test: At age 39, start having a cholesterol test every 5 years, or more often if advised.  Bone  density scan (DEXA): At age 50, ask your care team if you should have this scan for osteoporosis (brittle bones).  Hepatitis C: Get tested at least once in your life.  STIs (sexually transmitted infections)  Before age 24: Ask your care team if you should be screened for STIs.  After age 24: Get screened for STIs if you're at risk. You are at risk for STIs (including HIV) if:  You are sexually active with more than one person.  You don't use condoms every time.  You or a partner was diagnosed with a sexually transmitted infection.  If you are at risk for HIV, ask about PrEP medicine to prevent HIV.  Get tested for HIV at least once in your life, whether you are at risk for HIV or not.  Cancer screening tests  Cervical cancer screening: If you have a cervix, begin getting regular cervical cancer screening tests at age 21. Most people who have regular screenings with normal results can stop after age 65. Talk about this with your provider.  Breast cancer scan (mammogram): If you've ever had breasts, begin having regular mammograms starting at age 40. This is a scan to check for breast cancer.  Colon cancer screening: It is important to start screening for colon cancer at age 45.  Have a colonoscopy test every 10 years (or more often if you're at risk) Or, ask your provider about stool tests like a FIT test every year or Cologuard test every 3 years.  To learn more about your testing options, visit: https://www.Ecell[employer] Carson/878381.pdf.  For help making a decision, visit: https://bit.ly/cj45333.  Prostate cancer screening test: If you have a prostate and are age 55 to 69, ask your provider if you would benefit from a yearly prostate cancer screening test.  Lung cancer screening: If you are a current or former smoker age 50 to 80, ask your care team if ongoing lung cancer screenings are right for you.  For informational purposes only. Not to replace the advice of your health care provider. Copyright   2023 [facility]. All rights reserved. Clinically reviewed by the [facility] Transitions Program. [employer] [id number] - REV 01/24.    Learning About Stress  What is stress?     Stress is your body's response to a hard situation. Your body can have a physical, emotional, or mental response. Stress is a fact of life for most people, and it affects everyone differently. What causes stress for you may not be stressful for someone else.  A lot of things can cause stress. You may feel stress when you go on a job interview, take a test, or run a race. This kind of short-term stress is normal and even useful. It can help you if you need to work hard or react quickly. For example, stress can help you finish an important job on time.  Long-term stress is caused by ongoing stressful situations or events. Examples of long-term stress include long-term health problems, ongoing problems at work, or conflicts in your family. Long-term stress can harm your health.  How does stress affect your health?  When you are stressed, your body responds as though you are in danger. It makes hormones that speed up your heart, make you breathe faster, and give you a burst of energy. This is called the fight-or-flight stress response. If the stress is over quickly, your body goes back to normal and no harm is done.  But if stress happens too often or lasts too long, it can have bad effects. Long-term stress can make you more likely to get sick, and it can make symptoms of some diseases worse. If you tense up when you are stressed, you may develop neck, shoulder, or low back pain. Stress is linked to high blood pressure and heart disease.  Stress also harms your emotional health. It can make you turner, tense, or depressed. Your relationships may suffer, and you may not do well at work or school.  What can you do to manage stress?  You can try these things to help manage stress:   Do something active. Exercise or activity can help reduce stress.  Walking is a great way to get started. Even everyday activities such as housecleaning or yard work can help.  Try yoga or taye chi. These techniques combine exercise and meditation. You may need some training at first to learn them.  Do something you enjoy. For example, listen to music or go to a movie. Practice your hobby or do volunteer work.  Meditate. This can help you relax, because you are not worrying about what happened before or what may happen in the future.  Do guided imagery. Imagine yourself in any setting that helps you feel calm. You can use online videos, books, or a teacher to guide you.  Do breathing exercises. For example:  From a standing position, bend forward from the waist with your knees slightly bent. Let your arms dangle close to the floor.  Breathe in slowly and deeply as you return to a standing position. Roll up slowly and lift your head last.  Hold your breath for just a few seconds in the standing position.  Breathe out slowly and bend forward from the waist.  Let your feelings out. Talk, laugh, cry, and express anger when you need to. Talking with supportive friends or family, a counselor, or a rajat leader about your feelings is a healthy way to relieve stress. Avoid discussing your feelings with people who make you feel worse.  Write. It may help to write about things that are bothering you. This helps you find out how much stress you feel and what is causing it. When you know this, you can find better ways to cope.  What can you do to prevent stress?  You might try some of these things to help prevent stress:  Manage your time. This helps you find time to do the things you want and need to do.  Get enough sleep. Your body recovers from the stresses of the day while you are sleeping.  Get support. Your family, friends, and community can make a difference in how you experience stress.  Limit your news feed. Avoid or limit time on social media or news that may make you feel  "stressed.  Do something active. Exercise or activity can help reduce stress. Walking is a great way to get started.  Where can you learn more?  Go to https://www.[employer].net/patiented  Enter N032 in the search box to learn more about \"Learning About Stress.\"  Current as of: October 24, 2023               Content Version: 14.0    9588-1908 [employer].   Care instructions adapted under license by your healthcare professional. If you have questions about a medical condition or this instruction, always ask your healthcare professional. [employer] disclaims any warranty or liability for your use of this information.      "

## 2024-03-22 LAB
ALBUMIN SERPL BCG-MCNC: 5 G/DL (ref 3.5–5.2)
ALP SERPL-CCNC: 127 U/L (ref 40–150)
ALT SERPL W P-5'-P-CCNC: 35 U/L (ref 0–50)
ANION GAP SERPL CALCULATED.3IONS-SCNC: 13 MMOL/L (ref 7–15)
AST SERPL W P-5'-P-CCNC: 20 U/L (ref 0–45)
BILIRUB SERPL-MCNC: 0.3 MG/DL
BUN SERPL-MCNC: 12.9 MG/DL (ref 6–20)
CALCIUM SERPL-MCNC: 10.2 MG/DL (ref 8.6–10)
CHLORIDE SERPL-SCNC: 99 MMOL/L (ref 98–107)
CREAT SERPL-MCNC: 0.77 MG/DL (ref 0.51–0.95)
DEPRECATED HCO3 PLAS-SCNC: 25 MMOL/L (ref 22–29)
EGFRCR SERPLBLD CKD-EPI 2021: >90 ML/MIN/1.73M2
GLUCOSE SERPL-MCNC: 73 MG/DL (ref 70–99)
POTASSIUM SERPL-SCNC: 4.8 MMOL/L (ref 3.4–5.3)
PROT SERPL-MCNC: 8 G/DL (ref 6.4–8.3)
SODIUM SERPL-SCNC: 137 MMOL/L (ref 135–145)
VIT D+METAB SERPL-MCNC: 49 NG/ML (ref 20–50)

## 2024-04-11 ASSESSMENT — ANXIETY QUESTIONNAIRES
5. BEING SO RESTLESS THAT IT IS HARD TO SIT STILL: NOT AT ALL
GAD7 TOTAL SCORE: 0
7. FEELING AFRAID AS IF SOMETHING AWFUL MIGHT HAPPEN: NOT AT ALL
4. TROUBLE RELAXING: NOT AT ALL
2. NOT BEING ABLE TO STOP OR CONTROL WORRYING: NOT AT ALL
GAD7 TOTAL SCORE: 0
3. WORRYING TOO MUCH ABOUT DIFFERENT THINGS: NOT AT ALL
6. BECOMING EASILY ANNOYED OR IRRITABLE: NOT AT ALL
GAD7 TOTAL SCORE: 0
1. FEELING NERVOUS, ANXIOUS, OR ON EDGE: NOT AT ALL
7. FEELING AFRAID AS IF SOMETHING AWFUL MIGHT HAPPEN: NOT AT ALL

## 2024-04-18 ENCOUNTER — VIRTUAL VISIT (OUTPATIENT)
Dept: FAMILY MEDICINE | Facility: CLINIC | Age: 39
End: 2024-04-18
Payer: COMMERCIAL

## 2024-04-18 DIAGNOSIS — F33.0 MILD RECURRENT MAJOR DEPRESSION (H): ICD-10-CM

## 2024-04-18 DIAGNOSIS — F41.9 ANXIETY: ICD-10-CM

## 2024-04-18 PROCEDURE — G2211 COMPLEX E/M VISIT ADD ON: HCPCS | Mod: 95 | Performed by: STUDENT IN AN ORGANIZED HEALTH CARE EDUCATION/TRAINING PROGRAM

## 2024-04-18 PROCEDURE — 99214 OFFICE O/P EST MOD 30 MIN: CPT | Mod: 95 | Performed by: STUDENT IN AN ORGANIZED HEALTH CARE EDUCATION/TRAINING PROGRAM

## 2024-04-18 RX ORDER — VENLAFAXINE HYDROCHLORIDE 37.5 MG/1
37.5 CAPSULE, EXTENDED RELEASE ORAL DAILY
Qty: 60 CAPSULE | Refills: 1 | Status: SHIPPED | OUTPATIENT
Start: 2024-04-18 | End: 2024-05-30

## 2024-04-18 NOTE — PROGRESS NOTES
"Kelly is a 39 year old who is being evaluated via a billable video visit.    How would you like to obtain your AVS? MyChart  If the video visit is dropped, the invitation should be resent by: Text to cell phone: [phone]  Will anyone else be joining your video visit? No      Assessment & Plan     (F41.9) Anxiety  Comment: Chronic, stable. Pt tolerating current dose. Did discuss importance of compliance daily to see its true effects and to minimize risk of withdrawals. Pt will remain on dose however did encourage pt to reach out to clinic if she needs to increase dose to 75 mg by taking 2 tablets. Pt verbalized understanding   Plan: venlafaxine (EFFEXOR XR) 37.5 MG 24 hr capsule            (F33.0) Mild recurrent major depression (H24)  Comment: Chronic, stable. See above  Plan: venlafaxine (EFFEXOR XR) 37.5 MG 24 hr capsule            Dictation Disclaimer: Some of this Note has been completed with voice-recognition dictation software. Although errors are generally corrected real-time, there is the potential for a rare error to be present in the completed chart.     The longitudinal plan of care for the diagnosis(es)/condition(s) as documented were addressed during this visit. Due to the added complexity in care, I will continue to support Kelly in the subsequent management and with ongoing continuity of care.              BMI  Estimated body mass index is 26.42 kg/m  as calculated from the following:    Height as of 3/21/24: 1.647 m (5' 4.84\").    Weight as of 3/21/24: 71.7 kg (158 lb).   Weight management plan: not discussed          Subjective   Kelly is a 39 year old, presenting for the following health issues:  mental health f/u    History of Present Illness       Mental Health Follow-up:  Patient presents to follow-up on Anxiety.    Patient's anxiety since last visit has been:  Good  The patient is not having other symptoms associated with anxiety.  Any significant life events: other  Patient is not feeling " anxious or having panic attacks.  Patient has no concerns about alcohol or drug use.    She eats 4 or more servings of fruits and vegetables daily.She consumes 0 sweetened beverage(s) daily.She exercises with enough effort to increase her heart rate 9 or less minutes per day.  She exercises with enough effort to increase her heart rate 3 or less days per week. She is missing 1 dose(s) of medications per week.  She is not taking prescribed medications regularly due to remembering to take.     Pt reports switching from sertraline to effexor. She does states at times she forgets to take her effexor from time to time. She reports putting a reminder in her phone to remember to take the medication.          ROS: 10 point ROS neg other than the symptoms noted above in the HPI.        Objective           Vitals:  No vitals were obtained today due to virtual visit.    Physical Exam   GENERAL: alert and no distress  EYES: Eyes grossly normal to inspection.  No discharge or erythema, or obvious scleral/conjunctival abnormalities.  RESP: No audible wheeze, cough, or visible cyanosis.    SKIN: Visible skin clear. No significant rash, abnormal pigmentation or lesions.  NEURO: Cranial nerves grossly intact.  Mentation and speech appropriate for age.  PSYCH: Appropriate affect, tone, and pace of words          Video-Visit Details    Type of service:  Video Visit   Originating Location (pt. Location): Home    Distant Location (provider location):  On-site  Platform used for Video Visit: Betito  Signed Electronically by: TREVOR GREGORY MD

## 2024-05-30 ENCOUNTER — MYC MEDICAL ADVICE (OUTPATIENT)
Dept: FAMILY MEDICINE | Facility: CLINIC | Age: 39
End: 2024-05-30
Payer: COMMERCIAL

## 2024-05-30 DIAGNOSIS — F41.9 ANXIETY: ICD-10-CM

## 2024-05-30 DIAGNOSIS — F33.0 MILD RECURRENT MAJOR DEPRESSION (H): ICD-10-CM

## 2024-05-30 RX ORDER — VENLAFAXINE HYDROCHLORIDE 75 MG/1
75 CAPSULE, EXTENDED RELEASE ORAL DAILY
Qty: 90 CAPSULE | Refills: 0 | Status: SHIPPED | OUTPATIENT
Start: 2024-05-30 | End: 2024-08-02

## 2024-08-02 ENCOUNTER — MYC REFILL (OUTPATIENT)
Dept: FAMILY MEDICINE | Facility: CLINIC | Age: 39
End: 2024-08-02
Payer: COMMERCIAL

## 2024-08-02 ENCOUNTER — MYC MEDICAL ADVICE (OUTPATIENT)
Dept: FAMILY MEDICINE | Facility: CLINIC | Age: 39
End: 2024-08-02
Payer: COMMERCIAL

## 2024-08-02 DIAGNOSIS — F33.0 MILD RECURRENT MAJOR DEPRESSION (H): ICD-10-CM

## 2024-08-02 DIAGNOSIS — F41.9 ANXIETY: ICD-10-CM

## 2024-08-02 RX ORDER — VENLAFAXINE HYDROCHLORIDE 75 MG/1
75 CAPSULE, EXTENDED RELEASE ORAL DAILY
Qty: 90 CAPSULE | Refills: 1 | Status: SHIPPED | OUTPATIENT
Start: 2024-08-02 | End: 2024-09-26

## 2024-09-23 ENCOUNTER — MYC MEDICAL ADVICE (OUTPATIENT)
Dept: FAMILY MEDICINE | Facility: CLINIC | Age: 39
End: 2024-09-23
Payer: COMMERCIAL

## 2024-09-23 ASSESSMENT — ANXIETY QUESTIONNAIRES
8. IF YOU CHECKED OFF ANY PROBLEMS, HOW DIFFICULT HAVE THESE MADE IT FOR YOU TO DO YOUR WORK, TAKE CARE OF THINGS AT HOME, OR GET ALONG WITH OTHER PEOPLE?: NOT DIFFICULT AT ALL
GAD7 TOTAL SCORE: 3
7. FEELING AFRAID AS IF SOMETHING AWFUL MIGHT HAPPEN: SEVERAL DAYS
GAD7 TOTAL SCORE: 3
GAD7 TOTAL SCORE: 3

## 2024-09-25 ASSESSMENT — PATIENT HEALTH QUESTIONNAIRE - PHQ9
10. IF YOU CHECKED OFF ANY PROBLEMS, HOW DIFFICULT HAVE THESE PROBLEMS MADE IT FOR YOU TO DO YOUR WORK, TAKE CARE OF THINGS AT HOME, OR GET ALONG WITH OTHER PEOPLE: NOT DIFFICULT AT ALL
SUM OF ALL RESPONSES TO PHQ QUESTIONS 1-9: 6
SUM OF ALL RESPONSES TO PHQ QUESTIONS 1-9: 6

## 2024-09-26 ENCOUNTER — OFFICE VISIT (OUTPATIENT)
Dept: FAMILY MEDICINE | Facility: CLINIC | Age: 39
End: 2024-09-26
Payer: COMMERCIAL

## 2024-09-26 VITALS
DIASTOLIC BLOOD PRESSURE: 80 MMHG | OXYGEN SATURATION: 97 % | WEIGHT: 164 LBS | HEIGHT: 65 IN | SYSTOLIC BLOOD PRESSURE: 132 MMHG | TEMPERATURE: 97.9 F | HEART RATE: 74 BPM | RESPIRATION RATE: 16 BRPM | BODY MASS INDEX: 27.32 KG/M2

## 2024-09-26 DIAGNOSIS — R11.0 NAUSEA: ICD-10-CM

## 2024-09-26 DIAGNOSIS — F41.9 ANXIETY: Primary | ICD-10-CM

## 2024-09-26 DIAGNOSIS — F33.0 MILD RECURRENT MAJOR DEPRESSION (H): ICD-10-CM

## 2024-09-26 DIAGNOSIS — Z23 NEED FOR VACCINATION: ICD-10-CM

## 2024-09-26 PROCEDURE — 90656 IIV3 VACC NO PRSV 0.5 ML IM: CPT | Performed by: STUDENT IN AN ORGANIZED HEALTH CARE EDUCATION/TRAINING PROGRAM

## 2024-09-26 PROCEDURE — 90471 IMMUNIZATION ADMIN: CPT | Performed by: STUDENT IN AN ORGANIZED HEALTH CARE EDUCATION/TRAINING PROGRAM

## 2024-09-26 PROCEDURE — 90480 ADMN SARSCOV2 VAC 1/ONLY CMP: CPT | Performed by: STUDENT IN AN ORGANIZED HEALTH CARE EDUCATION/TRAINING PROGRAM

## 2024-09-26 PROCEDURE — 91320 SARSCV2 VAC 30MCG TRS-SUC IM: CPT | Performed by: STUDENT IN AN ORGANIZED HEALTH CARE EDUCATION/TRAINING PROGRAM

## 2024-09-26 PROCEDURE — 99214 OFFICE O/P EST MOD 30 MIN: CPT | Mod: 25 | Performed by: STUDENT IN AN ORGANIZED HEALTH CARE EDUCATION/TRAINING PROGRAM

## 2024-09-26 RX ORDER — VENLAFAXINE HYDROCHLORIDE 37.5 MG/1
37.5 CAPSULE, EXTENDED RELEASE ORAL DAILY
Qty: 14 CAPSULE | Refills: 0 | Status: SHIPPED | OUTPATIENT
Start: 2024-09-26 | End: 2024-10-10

## 2024-09-26 RX ORDER — BUSPIRONE HYDROCHLORIDE 5 MG/1
5 TABLET ORAL 2 TIMES DAILY
Qty: 60 TABLET | Refills: 1 | Status: SHIPPED | OUTPATIENT
Start: 2024-09-26 | End: 2024-10-04

## 2024-09-26 RX ORDER — ONDANSETRON 4 MG/1
4 TABLET, ORALLY DISINTEGRATING ORAL EVERY 8 HOURS PRN
Qty: 30 TABLET | Refills: 0 | Status: SHIPPED | OUTPATIENT
Start: 2024-09-26

## 2024-09-26 NOTE — PROGRESS NOTES
Assessment & Plan     (F41.9) Anxiety  (primary encounter diagnosis)  Comment: Chronic, uncontrolled.  Did have an office discussion regarding postpartum of moderately changes as a relates to mental health as well as discontinuing off serotonin like drugs for the management of anxiety and depression.  Did recommend patient to start BuSpar and will start BuSpar as a cross taper while we are tapering off of Effexor.  Prescription for 2-week supply of Effexor given.  Patient given antinausea medication to help with withdrawal.  Patient encouraged to follow-up in 1 month for reevaluation.  Plan: REVIEW OF HEALTH MAINTENANCE PROTOCOL ORDERS,         venlafaxine (EFFEXOR XR) 37.5 MG 24 hr capsule,        busPIRone (BUSPAR) 5 MG tablet            (F33.0) Mild recurrent major depression (H24)  Comment: Chronic, uncontrolled. See above  Plan: venlafaxine (EFFEXOR XR) 37.5 MG 24 hr capsule            (Z23) Need for vaccination  Comment: Stable  Plan: INFLUENZA VACCINE,SPLIT         VIRUS,TRIVALENT,PF(FLUZONE), COVID-19 12+         (PFIZER)            (R11.0) Nausea  Comment: Acute, stable  Plan: ondansetron (ZOFRAN ODT) 4 MG ODT tab            Dictation Disclaimer: Some of this Note has been completed with voice-recognition dictation software. Although errors are generally corrected real-time, there is the potential for a rare error to be present in the completed chart.     The longitudinal plan of care for the diagnosis(es)/condition(s) as documented were addressed during this visit. Due to the added complexity in care, I will continue to support Kelly in the subsequent management and with ongoing continuity of care.                  Subjective   Kelly is a 39 year old, presenting for the following health issues:  Recheck Medication    History of Present Illness       Mental Health Follow-up:  Patient presents to follow-up on Anxiety.    Patient's anxiety since last visit has been:  Good  The patient is having other  "symptoms associated with anxiety.  Any significant life events: financial concerns and other  Patient is not feeling anxious or having panic attacks.  Patient has no concerns about alcohol or drug use.    She eats 4 or more servings of fruits and vegetables daily.She consumes 0 sweetened beverage(s) daily.She exercises with enough effort to increase her heart rate 10 to 19 minutes per day.  She exercises with enough effort to increase her heart rate 3 or less days per week. She is missing 1 dose(s) of medications per week.  She is not taking prescribed medications regularly due to side effects and remembering to take.     Patient is a 39-year-old female presenting for concerns of anxiety depression medication.  She reports a history of being on sertraline and when she was weaning off the medication she experienced severe side effects including dizziness, nausea.  Patient reports that she has not felt well on the Effexor and is wanting to wean off the medication at this time.  She endorses concerns with withdrawals and having vomiting episodes.  She endorses side effects of having low libido at this time.  Patient reports that her symptoms appear to be more anxiety related rather than depression and is open to an alternative medication.             ROS: 10 point ROS neg other than the symptoms noted above in the HPI.        Objective    /80   Pulse 74   Temp 97.9  F (36.6  C) (Temporal)   Resp 16   Ht 1.66 m (5' 5.35\")   Wt 74.4 kg (164 lb)   LMP 09/12/2024   SpO2 97%   BMI 27.00 kg/m    Body mass index is 27 kg/m .  Physical Exam   GENERAL: healthy, alert and no distress  EYES: Eyes grossly normal to inspection, PERRL and conjunctivae and sclerae normal  Neck: No visible JVD or lymphadenopathy   RESP: symmetrical rise in chest   CV: No peripheral edema notable   MS: no gross musculoskeletal defects noted  SKIN: no suspicious lesions or rashes  PSYCH: mentation appears normal, affect " normal/bright              Signed Electronically by: TREVOR GREGORY MD

## 2024-10-03 ENCOUNTER — MYC MEDICAL ADVICE (OUTPATIENT)
Dept: FAMILY MEDICINE | Facility: CLINIC | Age: 39
End: 2024-10-03
Payer: COMMERCIAL

## 2024-10-03 DIAGNOSIS — F41.9 ANXIETY: ICD-10-CM

## 2024-10-04 DIAGNOSIS — F33.0 MILD RECURRENT MAJOR DEPRESSION (H): Primary | ICD-10-CM

## 2024-10-14 ENCOUNTER — MYC MEDICAL ADVICE (OUTPATIENT)
Dept: FAMILY MEDICINE | Facility: CLINIC | Age: 39
End: 2024-10-14
Payer: COMMERCIAL

## 2024-10-14 DIAGNOSIS — M25.561 RIGHT KNEE PAIN, UNSPECIFIED CHRONICITY: Primary | ICD-10-CM

## 2024-10-21 ASSESSMENT — ACTIVITIES OF DAILY LIVING (ADL)
STAND: ACTIVITY IS NOT DIFFICULT
PAIN: THE SYMPTOM AFFECTS MY ACTIVITY MODERATELY
WALK: ACTIVITY IS NOT DIFFICULT
WEAKNESS: THE SYMPTOM AFFECTS MY ACTIVITY SLIGHTLY
WEAKNESS: THE SYMPTOM AFFECTS MY ACTIVITY SLIGHTLY
SIT WITH YOUR KNEE BENT: ACTIVITY IS SOMEWHAT DIFFICULT
RAW_SCORE: 58
GO UP STAIRS: ACTIVITY IS NOT DIFFICULT
STIFFNESS: THE SYMPTOM AFFECTS MY ACTIVITY SLIGHTLY
LIMPING: I HAVE THE SYMPTOM BUT IT DOES NOT AFFECT MY ACTIVITY
HOW_WOULD_YOU_RATE_THE_OVERALL_FUNCTION_OF_YOUR_KNEE_DURING_YOUR_USUAL_DAILY_ACTIVITIES?: NEARLY NORMAL
PAIN: THE SYMPTOM AFFECTS MY ACTIVITY MODERATELY
GO DOWN STAIRS: ACTIVITY IS NOT DIFFICULT
AS_A_RESULT_OF_YOUR_KNEE_INJURY,_HOW_WOULD_YOU_RATE_YOUR_CURRENT_LEVEL_OF_DAILY_ACTIVITY?: NEARLY NORMAL
RISE FROM A CHAIR: ACTIVITY IS NOT DIFFICULT
KNEE_ACTIVITY_OF_DAILY_LIVING_SUM: 58
RISE FROM A CHAIR: ACTIVITY IS NOT DIFFICULT
HOW_WOULD_YOU_RATE_THE_CURRENT_FUNCTION_OF_YOUR_KNEE_DURING_YOUR_USUAL_DAILY_ACTIVITIES_ON_A_SCALE_FROM_0_TO_100_WITH_100_BEING_YOUR_LEVEL_OF_KNEE_FUNCTION_PRIOR_TO_YOUR_INJURY_AND_0_BEING_THE_INABILITY_TO_PERFORM_ANY_OF_YOUR_USUAL_DAILY_ACTIVITIES?: 90
WALK: ACTIVITY IS NOT DIFFICULT
SWELLING: I DO NOT HAVE THE SYMPTOM
KNEEL ON THE FRONT OF YOUR KNEE: ACTIVITY IS MINIMALLY DIFFICULT
SQUAT: ACTIVITY IS MINIMALLY DIFFICULT
LIMPING: I HAVE THE SYMPTOM BUT IT DOES NOT AFFECT MY ACTIVITY
PLEASE_INDICATE_YOR_PRIMARY_REASON_FOR_REFERRAL_TO_THERAPY:: KNEE
HOW_WOULD_YOU_RATE_THE_OVERALL_FUNCTION_OF_YOUR_KNEE_DURING_YOUR_USUAL_DAILY_ACTIVITIES?: NEARLY NORMAL
KNEE_ACTIVITY_OF_DAILY_LIVING_SCORE: 82.86
KNEEL ON THE FRONT OF YOUR KNEE: ACTIVITY IS MINIMALLY DIFFICULT
STIFFNESS: THE SYMPTOM AFFECTS MY ACTIVITY SLIGHTLY
GIVING WAY, BUCKLING OR SHIFTING OF KNEE: I DO NOT HAVE THE SYMPTOM
HOW_WOULD_YOU_RATE_THE_CURRENT_FUNCTION_OF_YOUR_KNEE_DURING_YOUR_USUAL_DAILY_ACTIVITIES_ON_A_SCALE_FROM_0_TO_100_WITH_100_BEING_YOUR_LEVEL_OF_KNEE_FUNCTION_PRIOR_TO_YOUR_INJURY_AND_0_BEING_THE_INABILITY_TO_PERFORM_ANY_OF_YOUR_USUAL_DAILY_ACTIVITIES?: 90
GO DOWN STAIRS: ACTIVITY IS NOT DIFFICULT
GIVING WAY, BUCKLING OR SHIFTING OF KNEE: I DO NOT HAVE THE SYMPTOM
SQUAT: ACTIVITY IS MINIMALLY DIFFICULT
AS_A_RESULT_OF_YOUR_KNEE_INJURY,_HOW_WOULD_YOU_RATE_YOUR_CURRENT_LEVEL_OF_DAILY_ACTIVITY?: NEARLY NORMAL
SWELLING: I DO NOT HAVE THE SYMPTOM
GO UP STAIRS: ACTIVITY IS NOT DIFFICULT
SIT WITH YOUR KNEE BENT: ACTIVITY IS SOMEWHAT DIFFICULT
STAND: ACTIVITY IS NOT DIFFICULT

## 2024-10-22 DIAGNOSIS — F33.0 MILD RECURRENT MAJOR DEPRESSION (H): ICD-10-CM

## 2024-10-22 DIAGNOSIS — F41.9 ANXIETY: ICD-10-CM

## 2024-10-22 RX ORDER — VENLAFAXINE HYDROCHLORIDE 37.5 MG/1
CAPSULE, EXTENDED RELEASE ORAL
Qty: 30 CAPSULE | Refills: 0 | Status: SHIPPED | OUTPATIENT
Start: 2024-10-22

## 2024-10-24 ENCOUNTER — THERAPY VISIT (OUTPATIENT)
Dept: PHYSICAL THERAPY | Facility: CLINIC | Age: 39
End: 2024-10-24
Attending: STUDENT IN AN ORGANIZED HEALTH CARE EDUCATION/TRAINING PROGRAM
Payer: COMMERCIAL

## 2024-10-24 DIAGNOSIS — M25.561 RIGHT KNEE PAIN, UNSPECIFIED CHRONICITY: ICD-10-CM

## 2024-10-24 PROCEDURE — 97161 PT EVAL LOW COMPLEX 20 MIN: CPT | Mod: GP

## 2024-10-24 PROCEDURE — 97110 THERAPEUTIC EXERCISES: CPT | Mod: GP

## 2024-10-24 NOTE — PROGRESS NOTES
PHYSICAL THERAPY EVALUATION  Type of Visit: Evaluation       Fall Risk Screen:  Fall screen completed by: PT  Have you fallen 2 or more times in the past year?: No  Have you fallen and had an injury in the past year?: No  Is patient a fall risk?: No    Subjective   Kelly reports she injured her R knee about 12 years ago when she was training for a run and she slipped on ice. The pain has been off and on since then. She reports she has had an increase in pain since having her baby about a year ago. She enjoys biking and hiking, but reports she has to wear a knee sleeve to limit some of the pain, but it does not make it disappear completely. She also reports that she is unable to cross her R leg over her L due to pain along the medial side of her knee. She uses tylenol sometimes for the pain. She denies clicking or catching. Stairs are harder going down than going up and she needs a pillow between her knees when she is sleeping on her left side.     Presenting condition or subjective complaint: (Patient-Rptd) I have pain in my right knee most of the time. I think it stems from an injury while running several years ago.  Date of onset: 10/24/24    Relevant medical history: (Patient-Rptd) Depression   Dates & types of surgery: (Patient-Rptd) foot , wisdom teeth , colonoscopy , appendectomy , D&C ,      Prior diagnostic imaging/testing results:       Prior therapy history for the same diagnosis, illness or injury: (Patient-Rptd) No      Prior Level of Function  Transfers:   Ambulation:   ADL:   IADL:     Living Environment  Social support: (Patient-Rptd) With a significant other or spouse   Type of home: (Patient-Rptd) House   Stairs to enter the home: (Patient-Rptd) Yes (Patient-Rptd) 4 Is there a railing: (Patient-Rptd) Yes     Ramp: (Patient-Rptd) No   Stairs inside the home: (Patient-Rptd) Yes (Patient-Rptd) 15 Is there a railing: (Patient-Rptd) No     Help at home: (Patient-Rptd)  "None  Equipment owned:       Employment: (Patient-Rptd) Yes (Patient-Rptd)   Hobbies/Interests: (Patient-Rptd) Biking, Hiking, Camping, Swimming, Minimal Running    Patient goals for therapy: Cross legs at work without increased pain, 20-30 mile bike ride without increased pain, be able to return to backpacking with 30lb weight (Patient-Rptd) Not have constant dull pain in my knee. Physical activity without aggravating or worry about aggravating my knee.    Physical activity: walks, hiking, biking, running, backpacking    Pain assessment: Pain present     Objective   KNEE EVALUATION  PAIN: Pain Level at Rest: 0/10  Pain Level with Use: 7/10  Pain Location: knee  Pain Quality: Aching, Dull, and Sharp  Pain Frequency: intermittent  Pain is Worst: nighttime  Pain is Exacerbated By: see above  Pain is Relieved By: NSAIDs and brace  Pain Progression: Worsened  INTEGUMENTARY (edema, incisions):   POSTURE:   GAIT:  Weightbearing Status:   Assistive Device(s):   Gait Deviations: WNL  BALANCE/PROPRIOCEPTION: WNL, increased ankle strategy on R  WEIGHTBEARING ALIGNMENT:   NON-WEIGHTBEARING ALIGNMENT:   ROM: AROM WNL  PROM WNL    STRENGTH:   Pain: - none + mild ++ moderate +++ severe  Strength Scale: 0-5/5 Left Right   Knee Flexion 5 4+   Knee Extension 5 4+   Quad Set 5 5   Hip Flexion 5 4   Hip Extension 5- 4+   Hip Abduction 4+ 3+     FLEXIBILITY: WNL  SPECIAL TESTS: WNL  FUNCTIONAL TESTS: Step Test: dynamic valgus on R when stepping down 4\" step  Double Leg Squat: Knee valgus and Hip internal rotation  PALPATION: tenderness to medial joint line at times  JOINT MOBILITY:     Assessment & Plan   CLINICAL IMPRESSIONS  Medical Diagnosis: Right knee pain    Treatment Diagnosis: Right knee pain   Impression/Assessment: Patient is a 39 year old female with right knee pain complaints.  The following significant findings have been identified: Pain and Decreased strength. These impairments interfere with their ability to " perform self care tasks, work tasks, and recreational activities as compared to previous level of function.     Clinical Decision Making (Complexity):  Clinical Presentation: Stable/Uncomplicated  Clinical Presentation Rationale: based on medical and personal factors listed in PT evaluation  Clinical Decision Making (Complexity): Low complexity    PLAN OF CARE  Treatment Interventions:  Interventions: Gait Training, Manual Therapy, Neuromuscular Re-education, Therapeutic Activity, Therapeutic Exercise, Self-Care/Home Management    Long Term Goals     PT Goal 1  Goal Identifier: LTG 1  Goal Description: Patient will be able to return to biking 20 miles without increased knee pain  Rationale: to maximize safety and independence with self cares;to maximize safety and independence within the community  Target Date: 01/02/25      Frequency of Treatment: 1x week  Duration of Treatment: 2 weeks, then 2x month for 2 months    Recommended Referrals to Other Professionals:   Education Assessment:   Learner/Method: Patient;No Barriers to Learning    Risks and benefits of evaluation/treatment have been explained.   Patient/Family/caregiver agrees with Plan of Care.     Evaluation Time:     PT Eval, Low Complexity Minutes (98040): 25       Signing Clinician: Rj Hobbs PT

## 2024-11-01 ENCOUNTER — THERAPY VISIT (OUTPATIENT)
Dept: PHYSICAL THERAPY | Facility: CLINIC | Age: 39
End: 2024-11-01
Payer: COMMERCIAL

## 2024-11-01 DIAGNOSIS — M25.561 RIGHT KNEE PAIN, UNSPECIFIED CHRONICITY: Primary | ICD-10-CM

## 2024-11-01 PROCEDURE — 97110 THERAPEUTIC EXERCISES: CPT | Mod: GP

## 2024-11-13 ENCOUNTER — MYC MEDICAL ADVICE (OUTPATIENT)
Dept: FAMILY MEDICINE | Facility: CLINIC | Age: 39
End: 2024-11-13
Payer: COMMERCIAL

## 2024-11-18 ASSESSMENT — ANXIETY QUESTIONNAIRES
6. BECOMING EASILY ANNOYED OR IRRITABLE: SEVERAL DAYS
IF YOU CHECKED OFF ANY PROBLEMS ON THIS QUESTIONNAIRE, HOW DIFFICULT HAVE THESE PROBLEMS MADE IT FOR YOU TO DO YOUR WORK, TAKE CARE OF THINGS AT HOME, OR GET ALONG WITH OTHER PEOPLE: NOT DIFFICULT AT ALL
4. TROUBLE RELAXING: NOT AT ALL
GAD7 TOTAL SCORE: 1
5. BEING SO RESTLESS THAT IT IS HARD TO SIT STILL: NOT AT ALL
7. FEELING AFRAID AS IF SOMETHING AWFUL MIGHT HAPPEN: NOT AT ALL
3. WORRYING TOO MUCH ABOUT DIFFERENT THINGS: NOT AT ALL
7. FEELING AFRAID AS IF SOMETHING AWFUL MIGHT HAPPEN: NOT AT ALL
GAD7 TOTAL SCORE: 1
GAD7 TOTAL SCORE: 1
8. IF YOU CHECKED OFF ANY PROBLEMS, HOW DIFFICULT HAVE THESE MADE IT FOR YOU TO DO YOUR WORK, TAKE CARE OF THINGS AT HOME, OR GET ALONG WITH OTHER PEOPLE?: NOT DIFFICULT AT ALL
1. FEELING NERVOUS, ANXIOUS, OR ON EDGE: NOT AT ALL
2. NOT BEING ABLE TO STOP OR CONTROL WORRYING: NOT AT ALL

## 2024-11-21 ENCOUNTER — VIRTUAL VISIT (OUTPATIENT)
Dept: FAMILY MEDICINE | Facility: CLINIC | Age: 39
End: 2024-11-21
Payer: COMMERCIAL

## 2024-11-21 DIAGNOSIS — F33.0 MILD RECURRENT MAJOR DEPRESSION (H): Primary | ICD-10-CM

## 2024-11-21 DIAGNOSIS — R42 LIGHTHEADEDNESS: ICD-10-CM

## 2024-11-21 DIAGNOSIS — R61 NIGHT SWEATS: ICD-10-CM

## 2024-11-21 ASSESSMENT — ENCOUNTER SYMPTOMS: NERVOUS/ANXIOUS: 1

## 2024-11-21 NOTE — PROGRESS NOTES
"Kelly is a 39 year old who is being evaluated via a billable video visit.    How would you like to obtain your AVS? MyChart  If the video visit is dropped, the invitation should be resent by: Text to cell phone: [phone]  Will anyone else be joining your video visit? No      Assessment & Plan     (F33.0) Mild recurrent major depression (H)  (primary encounter diagnosis)  Comment: Chronic, improving. Pt tolerating sertraline however will be investigating aspects of dizziness and night sweats. Unsure if this is serotonin related as pt was on Effexor prior with similar symptomms  Plan: Pending labs     (R61) Night sweats  Comment: Acute, uncontrolled. Pending lab evaluation.  Plan: TSH with free T4 reflex, CBC with platelets and        differential, Iron and iron binding capacity,         CRP, inflammation, HIV Antigen Antibody Combo,         Comprehensive metabolic panel (BMP + Alb, Alk         Phos, ALT, AST, Total. Bili, TP), Vitamin D         deficiency screening            (R42) Lightheadedness  Comment: Acute, uncontrolled. Pending lab evaluation  Plan: TSH with free T4 reflex, CBC with platelets and        differential, Comprehensive metabolic panel         (BMP + Alb, Alk Phos, ALT, AST, Total. Bili,         TP), Vitamin D deficiency screening            Dictation Disclaimer: Some of this Note has been completed with voice-recognition dictation software. Although errors are generally corrected real-time, there is the potential for a rare error to be present in the completed chart.     The longitudinal plan of care for the diagnosis(es)/condition(s) as documented were addressed during this visit. Due to the added complexity in care, I will continue to support Kelly in the subsequent management and with ongoing continuity of care.            BMI  Estimated body mass index is 27 kg/m  as calculated from the following:    Height as of 9/26/24: 1.66 m (5' 5.35\").    Weight as of 9/26/24: 74.4 kg (164 lb). "             Tisha Mendoza is a 39 year old, presenting for the following health issues:  Depression and Anxiety    Anxiety    History of Present Illness       Mental Health Follow-up:  Patient presents to follow-up on Depression & Anxiety.Patient's depression since last visit has been:  Good  The patient is not having other symptoms associated with depression.  Patient's anxiety since last visit has been:  Good  The patient is not having other symptoms associated with anxiety.  Any significant life events: No  Patient is not feeling anxious or having panic attacks.  Patient has no concerns about alcohol or drug use.    She eats 4 or more servings of fruits and vegetables daily.She consumes 0 sweetened beverage(s) daily.She exercises with enough effort to increase her heart rate 9 or less minutes per day.  She is missing 1 dose(s) of medications per week.  She is not taking prescribed medications regularly due to remembering to take.     Patient reports that she stalled to switch over to the sertraline due to feeling terrified. She reports not throwing up once she switched. She reports having dizziness and has been consistent with taking the sertraline diligently. She reports she did not feel the dizziness prior and is unsure if it is related to the sertraline.  She reports having a period three times ( like cycle is picking back up), night sweats that may be correlated to period.  She reports having her period of 5 days, light to medium with increased clotting.   She reports that when she wakes up at night she is wet, sheets are wet.    She does feel like her mood is good.     She endorses having this symptoms while on effexor and now on sertraline.          ROS: 10 point ROS neg other than the symptoms noted above in the HPI.        Objective           Vitals:  No vitals were obtained today due to virtual visit.    Physical Exam   GENERAL: alert and no distress  EYES: Eyes grossly normal to inspection.  No  discharge or erythema, or obvious scleral/conjunctival abnormalities.  RESP: No audible wheeze, cough, or visible cyanosis.    SKIN: Visible skin clear. No significant rash, abnormal pigmentation or lesions.  NEURO: Cranial nerves grossly intact.  Mentation and speech appropriate for age.  PSYCH: Appropriate affect, tone, and pace of words          Video-Visit Details    Type of service:  Video Visit   Originating Location (pt. Location): Home    Distant Location (provider location):  On-site  Platform used for Video Visit: Betito  Signed Electronically by: TREVOR GREGORY MD

## 2025-01-11 DIAGNOSIS — F33.0 MILD RECURRENT MAJOR DEPRESSION: ICD-10-CM

## 2025-01-13 NOTE — TELEPHONE ENCOUNTER
Requested Prescriptions   Pending Prescriptions Disp Refills    sertraline (ZOLOFT) 50 MG tablet [Pharmacy Med Name: SERTRALINE 50MG TABLETS] 90 tablet      Sig: TAKE 1 TABLET(50 MG) BY MOUTH DAILY       SSRIs Protocol Failed - 1/13/2025 12:09 PM        Failed - PHQ-9 score less than 5 in past 6 months     Please review last PHQ-9 score.           Failed - Recent (12 mo) or future (90 days) visit within the authorizing provider's specialty     The patient must have completed an in-person or virtual visit within the past 12 months or has a future visit scheduled within the next 90 days with the authorizing provider s specialty.  Urgent care and e-visits do not qualify as an office visit for this protocol.          Passed - Medication is active on med list        Passed - Medication indicated for associated diagnosis     Medication is associated with one or more of the following diagnoses:              Anxiety             Bipolar  Depression  Obsessive-compulsive disorder             Panic disorder  Postmenopausal flushing             Premenstrual dysphoric disorder             Social phobia   Adjustment disorder with depressed mood   Mood disorder   Adjustment disorder with anxious mood          Passed - Patient is age 18 or older        Passed - No active pregnancy on record        Passed - No positive pregnancy test in last 12 months

## 2025-01-16 ENCOUNTER — MYC MEDICAL ADVICE (OUTPATIENT)
Dept: FAMILY MEDICINE | Facility: CLINIC | Age: 40
End: 2025-01-16
Payer: COMMERCIAL

## 2025-01-16 DIAGNOSIS — F33.0 MILD RECURRENT MAJOR DEPRESSION: ICD-10-CM

## 2025-01-16 NOTE — TELEPHONE ENCOUNTER
Please advise if additional refills can be provided for script to be held on file at pharmacy.     Pt not due for refill at this time. Rx was sent 1/13/25 for 90 tablets.    Marcie Eason RN

## 2025-02-28 PROBLEM — N76.0 BV (BACTERIAL VAGINOSIS): Status: RESOLVED | Noted: 2023-11-24 | Resolved: 2025-02-28

## 2025-02-28 PROBLEM — O09.299 HISTORY OF MISCARRIAGE, CURRENTLY PREGNANT: Status: RESOLVED | Noted: 2023-04-13 | Resolved: 2025-02-28

## 2025-02-28 PROBLEM — B96.89 BV (BACTERIAL VAGINOSIS): Status: RESOLVED | Noted: 2023-11-24 | Resolved: 2025-02-28

## 2025-02-28 PROBLEM — Z36.89 ENCOUNTER FOR TRIAGE IN PREGNANT PATIENT: Status: RESOLVED | Noted: 2023-11-24 | Resolved: 2025-02-28

## 2025-03-24 SDOH — HEALTH STABILITY: PHYSICAL HEALTH: ON AVERAGE, HOW MANY MINUTES DO YOU ENGAGE IN EXERCISE AT THIS LEVEL?: 20 MIN

## 2025-03-24 SDOH — HEALTH STABILITY: PHYSICAL HEALTH: ON AVERAGE, HOW MANY DAYS PER WEEK DO YOU ENGAGE IN MODERATE TO STRENUOUS EXERCISE (LIKE A BRISK WALK)?: 1 DAY

## 2025-03-24 ASSESSMENT — SOCIAL DETERMINANTS OF HEALTH (SDOH): HOW OFTEN DO YOU GET TOGETHER WITH FRIENDS OR RELATIVES?: ONCE A WEEK

## 2025-03-27 ENCOUNTER — OFFICE VISIT (OUTPATIENT)
Dept: FAMILY MEDICINE | Facility: CLINIC | Age: 40
End: 2025-03-27
Attending: STUDENT IN AN ORGANIZED HEALTH CARE EDUCATION/TRAINING PROGRAM
Payer: COMMERCIAL

## 2025-03-27 VITALS
RESPIRATION RATE: 16 BRPM | HEART RATE: 67 BPM | WEIGHT: 161.2 LBS | BODY MASS INDEX: 26.86 KG/M2 | OXYGEN SATURATION: 97 % | HEIGHT: 65 IN | DIASTOLIC BLOOD PRESSURE: 73 MMHG | SYSTOLIC BLOOD PRESSURE: 113 MMHG | TEMPERATURE: 98.4 F

## 2025-03-27 DIAGNOSIS — M53.3 PAIN IN THE COCCYX: ICD-10-CM

## 2025-03-27 DIAGNOSIS — F33.0 MILD RECURRENT MAJOR DEPRESSION: ICD-10-CM

## 2025-03-27 DIAGNOSIS — Z00.00 ROUTINE GENERAL MEDICAL EXAMINATION AT A HEALTH CARE FACILITY: Primary | ICD-10-CM

## 2025-03-27 LAB
ALBUMIN SERPL BCG-MCNC: 4.8 G/DL (ref 3.5–5.2)
ALP SERPL-CCNC: 70 U/L (ref 40–150)
ALT SERPL W P-5'-P-CCNC: 13 U/L (ref 0–50)
ANION GAP SERPL CALCULATED.3IONS-SCNC: 10 MMOL/L (ref 7–15)
AST SERPL W P-5'-P-CCNC: 21 U/L (ref 0–45)
BILIRUB SERPL-MCNC: 0.5 MG/DL
BUN SERPL-MCNC: 14.3 MG/DL (ref 6–20)
CALCIUM SERPL-MCNC: 9.9 MG/DL (ref 8.8–10.4)
CHLORIDE SERPL-SCNC: 103 MMOL/L (ref 98–107)
CREAT SERPL-MCNC: 0.87 MG/DL (ref 0.51–0.95)
EGFRCR SERPLBLD CKD-EPI 2021: 86 ML/MIN/1.73M2
GLUCOSE SERPL-MCNC: 87 MG/DL (ref 70–99)
HCO3 SERPL-SCNC: 24 MMOL/L (ref 22–29)
POTASSIUM SERPL-SCNC: 4.7 MMOL/L (ref 3.4–5.3)
PROT SERPL-MCNC: 7.7 G/DL (ref 6.4–8.3)
SODIUM SERPL-SCNC: 137 MMOL/L (ref 135–145)

## 2025-03-27 ASSESSMENT — PATIENT HEALTH QUESTIONNAIRE - PHQ9
10. IF YOU CHECKED OFF ANY PROBLEMS, HOW DIFFICULT HAVE THESE PROBLEMS MADE IT FOR YOU TO DO YOUR WORK, TAKE CARE OF THINGS AT HOME, OR GET ALONG WITH OTHER PEOPLE: NOT DIFFICULT AT ALL
SUM OF ALL RESPONSES TO PHQ QUESTIONS 1-9: 1
SUM OF ALL RESPONSES TO PHQ QUESTIONS 1-9: 1

## 2025-03-27 NOTE — PROGRESS NOTES
Preventive Care Visit  [facility]  TREVOR GREGORY MD, Family Medicine  Mar 27, 2025      Assessment & Plan     (Z00.00) Routine general medical examination at a health care facility  (primary encounter diagnosis)  Comment: Stable  Plan: REVIEW OF HEALTH MAINTENANCE PROTOCOL ORDERS            (F33.0) Mild recurrent major depression  Comment: Chronic, stable. Refills   Plan: REVIEW OF HEALTH MAINTENANCE PROTOCOL ORDERS,         sertraline (ZOLOFT) 50 MG tablet, Comprehensive        metabolic panel (BMP + Alb, Alk Phos, ALT, AST,        Total. Bili, TP)            (M53.3) Pain in the coccyx  Comment: Chronic, uncontrolled. Chronic tailbone pain likely related to postpartum changes and posture. Discussed benefits of pelvic floor therapy for sacrum realignment and pelvic muscle strengthening.  Plan: REVIEW OF HEALTH MAINTENANCE PROTOCOL ORDERS,         Physical Therapy  Referral        - Refer to pelvic floor therapy.  - Recommend use of a donut cushion for sitting.                  Counseling  Appropriate preventive services were addressed with this patient via screening, questionnaire, or discussion as appropriate for fall prevention, nutrition, physical activity, Tobacco-use cessation, social engagement, weight loss and cognition.  Checklist reviewing preventive services available has been given to the patient.  Reviewed patient's diet, addressing concerns and/or questions.   She is at risk for lack of exercise and has been provided with information to increase physical activity for the benefit of her well-being.   The patient reports drinking more than 3 alcoholic drinks per day and/or more than 7 drhnks per week. The patient was counseled and given information about possible harmful effects of excessive alcohol intake.        Subjective   Kelly is a 40 year old, presenting for the following:  Physical           HPI  History of Present Illness  Kelly Wakefield is a 40 year  old female who presents with tailbone pain.    She experiences significant tailbone pain, described as a surge of pain when standing up. This pain has persisted for several months and has worsened over the past few weeks. She has been attempting to improve her posture to alleviate the pain but has not yet tried using a donut cushion.    She is 16 months postpartum and is still adjusting to her body's changes. She has a history of night sweats, which have improved since her last visit.    She has a history of ear infections, with a particularly severe episode over the winter that required multiple rounds of antibiotics to resolve. She experienced a double ear infection that was extremely painful, leading her to seek urgent care. She reports some residual ringing in her ears and scarring on the eardrum, which she worries might contribute to hearing loss.    Her father requires a kidney transplant and has been on home dialysis for two years. She is considering donating a kidney to him and is encouraged to discuss this with her sister, who is also considering donation.             Advance Care Planning  Patient does not have a Health Care Directive: Discussed advance care planning with patient; information given to patient to review.      3/24/2025   General Health   How would you rate your overall physical health? Good   Feel stress (tense, anxious, or unable to sleep) Only a little   (!) STRESS CONCERN      3/24/2025   Nutrition   Three or more servings of calcium each day? Yes   Diet: Regular (no restrictions)   How many servings of fruit and vegetables per day? 4 or more   How many sweetened beverages each day? 0-1         3/24/2025   Exercise   Days per week of moderate/strenous exercise 1 day   Average minutes spent exercising at this level 20 min   (!) EXERCISE CONCERN      3/24/2025   Social Factors   Frequency of gathering with friends or relatives Once a week   Worry food won't last until get money to buy more  No   Food not last or not have enough money for food? No   Do you have housing? (Housing is defined as stable permanent housing and does not include staying ouside in a car, in a tent, in an abandoned building, in an overnight shelter, or couch-surfing.) Yes   Are you worried about losing your housing? No   Lack of transportation? No   Unable to get utilities (heat,electricity)? No         3/24/2025   Dental   Dentist two times every year? Yes           3/14/2024   TB Screening   Were you born outside of the [place]? No         Today's PHQ-9 Score:       3/27/2025     8:05 AM   PHQ-9 SCORE   PHQ-9 Total Score MyChart 1 (Minimal depression)   PHQ-9 Total Score 1        Patient-reported         3/24/2025   Substance Use   Alcohol more than 3/day or more than 7/wk Yes   How often do you have a drink containing alcohol 4 or more times a week   How many alcohol drinks on typical day 1 or 2   How often do you have 5+ drinks at one occasion Less than monthly   Audit 2/3 Score 1   How often not able to stop drinking once started Never   How often failed to do what normally expected Never   How often needed first drink in am after a heavy drinking session Never   How often feeling of guilt or remorse after drinking Never   How often unable to remember what happened the night before Never   Have you or someone else been injured because of your drinking No   Has anyone been concerned or suggested you cut down on drinking No   TOTAL SCORE - AUDIT 5   Do you use any other substances recreationally? (!) CANNABIS PRODUCTS     Social History     Tobacco Use    Smoking status: Former     Current packs/day: 0.00     Average packs/day: 0.2 packs/day for 7.0 years (1.4 ttl pk-yrs)     Types: Cigarettes     Start date: 2013     Quit date: 2/10/2020     Years since quittin.1     Passive exposure: Past    Smokeless tobacco: Never   Vaping Use    Vaping status: Never Used   Substance Use Topics    Alcohol use: Yes    Drug use: Never  "          3/14/2024   LAST FHS-7 RESULTS   1st degree relative breast or ovarian cancer Yes   Any relative bilateral breast cancer No   Any male have breast cancer No   Any ONE woman have BOTH breast AND ovarian cancer No   Any woman with breast cancer before 50yrs No   2 or more relatives with breast AND/OR ovarian cancer No   2 or more relatives with breast AND/OR bowel cancer No        Mammogram Screening - Patient under 40 years of age: Routine Mammogram Screening not recommended.         3/24/2025   STI Screening   New sexual partner(s) since last STI/HIV test? No     History of abnormal Pap smear: No - age 30- 64 PAP with HPV every 5 years recommended        Latest Ref Rng & Units 1/22/2024    10:33 AM   PAP / HPV   PAP  Negative for Intraepithelial Lesion or Malignancy (NILM)    HPV 16 DNA Negative Negative    HPV 18 DNA Negative Negative    Other HR HPV Negative Negative            3/24/2025   Contraception/Family Planning   Questions about contraception or family planning (!) YES         Reviewed and updated as needed this visit by Provider                         ROS: 10 point ROS neg other than the symptoms noted above in the HPI.       Objective    Exam  /73   Pulse 67   Temp 98.4  F (36.9  C) (Temporal)   Resp 16   Ht 1.66 m (5' 5.35\")   Wt 73.1 kg (161 lb 3.2 oz)   LMP 03/05/2025 (Approximate)   SpO2 97%   BMI 26.54 kg/m     Estimated body mass index is 26.54 kg/m  as calculated from the following:    Height as of this encounter: 1.66 m (5' 5.35\").    Weight as of this encounter: 73.1 kg (161 lb 3.2 oz).    Physical Exam  GENERAL: alert and no distress  EYES: Eyes grossly normal to inspection, PERRL and conjunctivae and sclerae normal  HENT: ear canals and TM's normal, nose and mouth without ulcers or lesions  RESP: lungs clear to auscultation - no rales, rhonchi or wheezes  CV: regular rate and rhythm, normal S1 S2, no S3 or S4, no murmur, click or rub, no peripheral edema  MS: no gross " musculoskeletal defects noted, no edema  SKIN: no suspicious lesions or rashes  NEURO: Normal strength and tone, mentation intact and speech normal  PSYCH: mentation appears normal, affect normal/bright        Signed Electronically by: TREVOR GREGORY MD    Answers submitted by the patient for this visit:  Patient Health Questionnaire (Submitted on 3/27/2025)  If you checked off any problems, how difficult have these problems made it for you to do your work, take care of things at home, or get along with other people?: Not difficult at all  PHQ9 TOTAL SCORE: 1

## 2025-03-27 NOTE — PATIENT INSTRUCTIONS
Patient Education   Preventive Care Advice   This is general advice given by our system to help you stay healthy. However, your care team may have specific advice just for you. Please talk to your care team about your preventive care needs.  Nutrition  Eat 5 or more servings of fruits and vegetables each day.  Try wheat bread, brown rice and whole grain pasta (instead of white bread, rice, and pasta).  Get enough calcium and vitamin D. Check the label on foods and aim for 100% of the RDA (recommended daily allowance).  Lifestyle  Exercise at least 150 minutes each week  (30 minutes a day, 5 days a week).  Do muscle strengthening activities 2 days a week. These help control your weight and prevent disease.  No smoking.  Wear sunscreen to prevent skin cancer.  Have a dental exam and cleaning every 6 months.  Yearly exams  See your health care team every year to talk about:  Any changes in your health.  Any medicines your care team has prescribed.  Preventive care, family planning, and ways to prevent chronic diseases.  Shots (vaccines)   HPV shots (up to age 26), if you've never had them before.  Hepatitis B shots (up to age 59), if you've never had them before.  COVID-19 shot: Get this shot when it's due.  Flu shot: Get a flu shot every year.  Tetanus shot: Get a tetanus shot every 10 years.  Pneumococcal, hepatitis A, and RSV shots: Ask your care team if you need these based on your risk.  Shingles shot (for age 50 and up)  General health tests  Diabetes screening:  Starting at age 35, Get screened for diabetes at least every 3 years.  If you are younger than age 35, ask your care team if you should be screened for diabetes.  Cholesterol test: At age 39, start having a cholesterol test every 5 years, or more often if advised.  Bone density scan (DEXA): At age 50, ask your care team if you should have this scan for osteoporosis (brittle bones).  Hepatitis C: Get tested at least once in your life.  STIs (sexually  transmitted infections)  Before age 24: Ask your care team if you should be screened for STIs.  After age 24: Get screened for STIs if you're at risk. You are at risk for STIs (including HIV) if:  You are sexually active with more than one person.  You don't use condoms every time.  You or a partner was diagnosed with a sexually transmitted infection.  If you are at risk for HIV, ask about PrEP medicine to prevent HIV.  Get tested for HIV at least once in your life, whether you are at risk for HIV or not.  Cancer screening tests  Cervical cancer screening: If you have a cervix, begin getting regular cervical cancer screening tests starting at age 21.  Breast cancer scan (mammogram): If you've ever had breasts, begin having regular mammograms starting at age 40. This is a scan to check for breast cancer.  Colon cancer screening: It is important to start screening for colon cancer at age 45.  Have a colonoscopy test every 10 years (or more often if you're at risk) Or, ask your provider about stool tests like a FIT test every year or Cologuard test every 3 years.  To learn more about your testing options, visit:   .  For help making a decision, visit:   https://bit.ly/kr62052.  Prostate cancer screening test: If you have a prostate, ask your care team if a prostate cancer screening test (PSA) at age 55 is right for you.  Lung cancer screening: If you are a current or former smoker ages 50 to 80, ask your care team if ongoing lung cancer screenings are right for you.  For informational purposes only. Not to replace the advice of your health care provider. Copyright   2023 [facility]. All rights reserved. Clinically reviewed by the [facility]. [employer] [id number] - REV 01/24.  9 Ways to Cut Back on Drinking  Maybe you've found yourself drinking more alcohol than you'd prefer. If you want to cut back, here are some ideas to try.    Think before you drink.  Do you really want a drink,  "or is it just a habit? If you're used to having a drink at a certain time, try doing something else then.     Look for substitutes.  Find some no-alcohol drinks that you enjoy, like flavored seltzer water, tea with honey, or tonic with a slice of lime. Or try alcohol-free beer or \"virgin\" cocktails (without the alcohol).     Drink more water.  Use water to quench your thirst. Drink a glass of water before you have any alcohol. Have another glass along with every drink or between drinks.     Shrink your drink.  For example, have a bottle of beer instead of a pint. Use a smaller glass for wine. Choose drinks with lower alcohol content (ABV%). Or use less liquor and more mixer in cocktails.     Slow down.  It's easy to drink quickly and without thinking about it. Pay attention, and make each drink last longer.     Do the math.  Total up how much you spend on alcohol each month. How much is that a year? If you cut back, what could you do with the money you save?     Take a break.  Choose a day or two each week when you won't drink at all. Notice how you feel on those days, physically and emotionally. How did you sleep? Do you feel better? Over time, add more break days.     Count calories.  Would you like to lose some weight? For some people that's a good motivator for cutting back. Figure out how many calories are in each drink. How many does that add up to in a day? In a week? In a month?     Practice saying no.  Be ready when someone offers you a drink. Try: \"Thanks, I've had enough.\" Or \"Thanks, but I'm cutting back.\" Or \"No, thanks. I feel better when I drink less.\"   Current as of: August 20, 2024  Content Version: 14.4 2024-2025 [employer].   Care instructions adapted under license by your healthcare professional. If you have questions about a medical condition or this instruction, always ask your healthcare professional. [employer] disclaims any warranty or liability for your use of " this information.  Substance Use Disorder: Care Instructions  Overview     You can improve your life and health by stopping your use of alcohol or drugs. When you don't drink or use drugs, you may feel and sleep better. You may get along better with your family, friends, and coworkers. There are medicines and programs that can help with substance use disorder.  How can you care for yourself at home?  Here are some ways to help you stay sober and prevent relapse.  If you have been given medicine to help keep you sober or reduce your cravings, be sure to take it exactly as prescribed.  Talk to your doctor about programs that can help you stop using drugs or drinking alcohol.  Do not keep alcohol or drugs in your home.  Plan ahead. Think about what you'll say if other people ask you to drink or use drugs. Try not to spend time with people who drink or use drugs.  Use the time and money spent on drinking or drugs to do something that's important to you.  Preventing a relapse  Have a plan to deal with relapse. Learn to recognize changes in your thinking that lead you to drink or use drugs. Get help before you start to drink or use drugs again.  Try to stay away from situations, friends, or places that may lead you to drink or use drugs.  If you feel the need to drink alcohol or use drugs again, seek help right away. Call a trusted friend or family member. Some people get support from organizations such as Narcotics Anonymous or [employer] or from treatment facilities.  If you relapse, get help as soon as you can. Some people make a plan with another person that outlines what they want that person to do for them if they relapse. The plan usually includes how to handle the relapse and who to notify in case of relapse.  Don't give up. Remember that a relapse doesn't mean that you have failed. Use the experience to learn the triggers that lead you to drink or use drugs. Then quit again. Recovery is a lifelong process.  "Many people have several relapses before they are able to quit for good.  Follow-up care is a key part of your treatment and safety. Be sure to make and go to all appointments, and call your doctor if you are having problems. It's also a good idea to know your test results and keep a list of the medicines you take.  When should you call for help?   Call 911  anytime you think you may need emergency care. For example, call if you or someone else:    Has overdosed or has withdrawal signs. Be sure to tell the emergency workers that you are or someone else is using or trying to quit using drugs. Overdose or withdrawal signs may include:  Losing consciousness.  Seizure.  Seeing or hearing things that aren't there (hallucinations).     Is thinking or talking about suicide or harming others.   Where to get help 24 hours a day, 7 days a week   If you or someone you know talks about suicide, self-harm, a mental health crisis, a substance use crisis, or any other kind of emotional distress, get help right away. You can:    Call the Suicide and Crisis Lifeline at 988.     Call 7-901-329-BANJ (1-110.570.6827).     Text HOME to 936119 to access the Crisis Text Line.   Consider saving these numbers in your phone.  Go to [employer] for more information or to chat online.  Call your doctor now or seek immediate medical care if:    You are having withdrawal symptoms. These may include nausea or vomiting, sweating, shakiness, and anxiety.   Watch closely for changes in your health, and be sure to contact your doctor if:    You have a relapse.     You need more help or support to stop.   Where can you learn more?  Go to https://www.[employer].net/patiented  Enter H573 in the search box to learn more about \"Substance Use Disorder: Care Instructions.\"  Current as of: August 20, 2024  Content Version: 14.4    3447-1854 [employer].   Care instructions adapted under license by your healthcare professional. If you have " questions about a medical condition or this instruction, always ask your healthcare professional. [employer] disclaims any warranty or liability for your use of this information.

## 2025-06-19 ENCOUNTER — E-VISIT (OUTPATIENT)
Dept: URGENT CARE | Facility: CLINIC | Age: 40
End: 2025-06-19
Payer: COMMERCIAL

## 2025-06-19 DIAGNOSIS — B30.9 VIRAL CONJUNCTIVITIS: Primary | ICD-10-CM

## 2025-06-19 RX ORDER — OFLOXACIN 3 MG/ML
SOLUTION/ DROPS OPHTHALMIC
Qty: 5 ML | Refills: 0 | Status: SHIPPED | OUTPATIENT
Start: 2025-06-19 | End: 2025-06-26

## 2025-06-19 NOTE — PATIENT INSTRUCTIONS
Kelly Wakefield,    Your photo and description of symptoms are consistent with pink eye caused by a virus. This can cause redness, irritation and itching in your eye as well as tearing and intermittent thickened discharge. Your eyes may even be stuck shut when when you wake up in the morning. Your eyelids may also become swollen. You'll be contagious while you have tearing and crusting in your eyes, generally 7-10 days. Wash your hands frequently and avoid touching your eyes to prevent spreading to others.     You may use over the counter lubricating eye drops to help ease your symptoms. Allergy eye drops such as Patanol (olopatadine) or Zaditor (ketotifen) will help to control the itching. Avoid redness reducing eye drops for an extended period of time as these can cause a rebound and make the redness and irritation return when you stop using the drops. Cool packs on your eyes can help with irritation and swelling. Symptoms generally last 7-10 days.    Antibiotic drops will not make a virus go away any faster and will not make you less contagious. However, if you notice that there is thick yellow or green discharge that is consistently draining from your eye (you're wiping it away every few minutes) then an antibiotic drop will help. I have sent a prescription to the pharmacy for you incase this happens. Follow the instructions on the medication.    If your symptoms are getting worse or not improving by the 10th day of symptoms, be seen in person for further evaluation.    You'll be feeling better soon!    Barbara Laguna PA-C  [facility]    Pinkeye: Care Instructions  Overview     Pinkeye is redness and swelling of the eye surface and the conjunctiva (the lining of the eyelid and the covering of the white part of the eye). Pinkeye is also called conjunctivitis. Pinkeye is often caused by infection with bacteria or a virus. Dry air, allergies, smoke, and chemicals are other common  causes.  Pinkeye often gets better on its own in 7 to 10 days. Antibiotics only help if the pinkeye is caused by bacteria. Pinkeye caused by infection spreads easily. If an allergy or chemical is causing pinkeye, it will not go away unless you can avoid whatever is causing it.  Follow-up care is a key part of your treatment and safety. Be sure to make and go to all appointments, and call your doctor if you are having problems. It's also a good idea to know your test results and keep a list of the medicines you take.  How can you care for yourself at home?  Wash your hands often. Always wash them before and after you treat pinkeye or touch your eyes or face.  Use moist cotton or a clean, wet cloth to remove crust. Wipe from the inside corner of the eye to the outside. Use a clean part of the cloth for each wipe.  Put cold or warm wet cloths on your eye a few times a day if the eye hurts.  Do not wear contact lenses or eye makeup until the pinkeye is gone. Throw away any eye makeup you were using when you got pinkeye. Clean your contacts and storage case. If you wear disposable contacts, use a new pair when your eye has cleared and it is safe to wear contacts again.  If the doctor gave you antibiotic ointment or eyedrops, use them as directed. Use the medicine for as long as instructed, even if your eye starts looking better soon. Keep the bottle tip clean, and do not let it touch the eye area.  To put in eyedrops or ointment:  Tilt your head back, and pull your lower eyelid down with one finger.  Drop or squirt the medicine inside the lower lid.  Close your eye for 30 to 60 seconds to let the drops or ointment move around.  Do not touch the ointment or dropper tip to your eyelashes or any other surface.  Do not share towels, pillows, or washcloths while you have pinkeye.  When should you call for help?   Call your doctor now or seek immediate medical care if:    You have pain in your eye, not just irritation on the  "surface.     You have a change in vision or loss of vision.     You have an increase in discharge from the eye.     Your eye has not started to improve or begins to get worse within 48 hours after you start using antibiotics.     Pinkeye lasts longer than 7 days.   Watch closely for changes in your health, and be sure to contact your doctor if you have any problems.  Where can you learn more?  Go to https://www.[employer].net/patiented  Enter Y392 in the search box to learn more about \"Pinkeye: Care Instructions.\"  Current as of: July 31, 2024  Content Version: 14.5    8743-1400 [employer].   Care instructions adapted under license by your healthcare professional. If you have questions about a medical condition or this instruction, always ask your healthcare professional. [employer] disclaims any warranty or liability for your use of this information.    "

## 2025-07-20 ENCOUNTER — OFFICE VISIT (OUTPATIENT)
Dept: URGENT CARE | Facility: URGENT CARE | Age: 40
End: 2025-07-20
Payer: COMMERCIAL

## 2025-07-20 ENCOUNTER — ANCILLARY PROCEDURE (OUTPATIENT)
Dept: GENERAL RADIOLOGY | Facility: CLINIC | Age: 40
End: 2025-07-20
Attending: INTERNAL MEDICINE
Payer: COMMERCIAL

## 2025-07-20 ENCOUNTER — NURSE TRIAGE (OUTPATIENT)
Dept: NURSING | Facility: CLINIC | Age: 40
End: 2025-07-20
Payer: COMMERCIAL

## 2025-07-20 VITALS
HEART RATE: 64 BPM | TEMPERATURE: 97.9 F | OXYGEN SATURATION: 100 % | DIASTOLIC BLOOD PRESSURE: 86 MMHG | HEIGHT: 65 IN | RESPIRATION RATE: 16 BRPM | WEIGHT: 162 LBS | SYSTOLIC BLOOD PRESSURE: 128 MMHG | BODY MASS INDEX: 26.99 KG/M2

## 2025-07-20 DIAGNOSIS — S60.111A SUBUNGUAL HEMATOMA OF RIGHT THUMB, INITIAL ENCOUNTER: ICD-10-CM

## 2025-07-20 DIAGNOSIS — S67.01XA CRUSHING INJURY OF RIGHT THUMB, INITIAL ENCOUNTER: Primary | ICD-10-CM

## 2025-07-20 PROCEDURE — 99214 OFFICE O/P EST MOD 30 MIN: CPT | Mod: 25 | Performed by: INTERNAL MEDICINE

## 2025-07-20 PROCEDURE — 11740 EVACUATION SUBUNGUAL HMTMA: CPT | Performed by: INTERNAL MEDICINE

## 2025-07-20 PROCEDURE — 73130 X-RAY EXAM OF HAND: CPT | Mod: TC | Performed by: RADIOLOGY

## 2025-07-20 ASSESSMENT — PAIN SCALES - GENERAL: PAINLEVEL_OUTOF10: SEVERE PAIN (7)

## 2025-07-20 NOTE — PROGRESS NOTES
Urgent Care Clinic Visit    Chief Complaint   Patient presents with    Hand Injury     Last night smashed right hand thumb in car door       Urgent Care               7/20/2025     9:47 AM   Additional Questions   Roomed by cale pink

## 2025-07-20 NOTE — PATIENT INSTRUCTIONS
X-ray of thumb - no obvious fracture     Finger splint    Soak thumb in warm Epsom salt water few times a day to help release blood under nail.    May stop once blood stops reaccumulating.    Keep area covered with Band-Aid but do not seal hole with Vaseline or bacitracin    Ice water baths to area.  Elevate.  Avoid use of finger while recovering  May continue to alternate with Tylenol and ibuprofen

## 2025-07-20 NOTE — TELEPHONE ENCOUNTER
Nurse Triage SBAR    Is this a 2nd Level Triage? NO    Situation:   Right thumb injury    Background:   She was getting her son out of the car last night and when she closed the car door, she jammed her thumb.    Assessment:   Crush injury right at the thumbnail.  Trapped blood under the nail - looks black, per patient.  Pain is severe even after taking tylenol and ibuprofen.  Pain radiates down to her arm.     Protocol Recommended Disposition:   See HCP Within 4 Hours (Or PCP Triage)    Recommendation:   Advised to be seen in  today.  Care advice given.  Pt verbalized understanding.    Kylee Owen, RN, BSN Nurse Triage Advisor 7/20/2025 9:07 AM        Reason for Disposition   [1] SEVERE pain AND [2] not improved 2 hours after pain medicine/ice packs    Additional Information   Negative: [1] Major bleeding (e.g., spurting blood) AND [2] can't be stopped   Negative: Wound looks infected   Negative: Caused by animal bite   Negative: Caused by human bite   Negative: Amputated finger   Negative: Skin is split open or gaping (or length > 1/2 inch or 12 mm)   Negative: [1] Bleeding AND [2] won't stop after 10 minutes of direct pressure (using correct technique)   Negative: [1] Dirt in the wound AND [2] not removed with 15 minutes of scrubbing   Negative: High pressure injection injury (e.g., from grease gun or paint gun, usually work-related)   Negative: Looks like a broken bone (e.g., crooked or deformed)   Negative: Looks like a dislocated joint (e.g., crooked or deformed)   Negative: [1] Fingernail is partially torn AND [2] from crush injury  (Exception: Torn nail from catching it on something.)   Negative: [1] Cut AND [2] numbness (loss of sensation) of finger   Negative: [1] Cut AND [2] finger joint can't be opened (straightened) or closed (bent) completely   Negative: Sounds like a serious injury to the triager    Protocols used: Finger Injury-A-

## 2025-07-20 NOTE — PROGRESS NOTES
"ASSESSMENT AND PLAN:      ICD-10-CM    1. Crushing injury of right thumb, initial encounter  S67.01XA       2. Subungual hematoma of right thumb, initial encounter  S60.111A XR Hand Right G/E 3 Views     EVACUATION BLOOD FROM UNDER NAIL            Patient Instructions     X-ray of thumb - no obvious fracture     Finger splint    Soak thumb in warm Epsom salt water few times a day to help release blood under nail.    May stop once blood stops reaccumulating.    Keep area covered with Band-Aid but do not seal hole with Vaseline or bacitracin    Ice water baths to area.  Elevate.  Avoid use of finger while recovering  May continue to alternate with Tylenol and ibuprofen    Call or return to clinic if symptoms worsen or fail to improve as anticipated.    Dispensed aluminum finger splint for protection of painful area after bleeding subsides      Celeste Mattson MD  [facility]    Subjective     Kelly Wakefield is a 40 year old who presents for Patient presents with:  Hand Injury: Last night smashed right hand thumb in car door     Urgent Care    an established patient of [facility].    MS Injury/Pain  Yesterday evening, injured right thumb closing car door on it      Mechanism: Crushing injury      Patient experienced immediate pain, immediate swelling    Current and Associated symptoms: Pain and fingernail is purple    Aggravating Factors: Touch, movement  Therapies to improve symptoms include: ice, ibuprofen, Tylenol, rest, and elevation  This is the first time this type of problem has occurred for this patient.       Review of Systems        Objective    /86   Pulse 64   Temp 97.9  F (36.6  C) (Temporal)   Resp 16   Ht 1.651 m (5' 5\")   Wt 73.5 kg (162 lb)   SpO2 100%   BMI 26.96 kg/m    Physical Exam  Vitals reviewed.   Constitutional:       Appearance: Normal appearance.   Musculoskeletal:      Comments: Right hand    Distal thumb very tender to touch with full collection of " blood under nail.    Patient consented to drainage.    Area cleansed with Betadine.  Heat cautery used to create a hole to release blood.  Large amount of blood released.  Pain has much improved although blood keeps reaccumulating under the nail   Neurological:      Mental Status: She is alert.            Procedure note above    I ended up repeating the procedure and placing 2 holes.  The second hole relieved a lot more pressure and pain.  Large amount of blood through both holes created    Xray - Reviewed and interpreted by me.  No fracture noted.

## (undated) DEVICE — STOCKING SLEEVE COMPRESSION CALF LG

## (undated) DEVICE — BNDG ABDOMINAL BINDER 10X26-50" 08140145

## (undated) DEVICE — PREP CHLORAPREP 26ML TINTED ORANGE  260815

## (undated) DEVICE — SOL WATER IRRIG 1000ML BOTTLE 07139-09

## (undated) DEVICE — SU MONOCRYL 0 CT-1 36" Y346H

## (undated) DEVICE — SOL NACL 0.9% IRRIG 1000ML BOTTLE 07138-09

## (undated) DEVICE — GLOVE SENSICARE PI POWDER FREE 7.5 LATEX FREE MSG9075

## (undated) DEVICE — SPONGE SURGIFOAM 100 1974

## (undated) DEVICE — ESU GROUND PAD UNIVERSAL W/O CORD

## (undated) DEVICE — STRAP KNEE/BODY 31143004

## (undated) DEVICE — DRSG ADAPTIC 3X8" 6113

## (undated) DEVICE — GLOVE ESTEEM POWDER FREE SMT 7.0  2D72PT70

## (undated) DEVICE — PACK C-SECTION LF PL15OTA83B

## (undated) DEVICE — SU MONOCRYL 4-0 PS-2 18" UND Y496G

## (undated) DEVICE — CATH TRAY FOLEY 16FR BARDEX W/DRAIN BAG STATLOCK 300316A

## (undated) DEVICE — SU VICRYL 0 CT-1 36" J346H

## (undated) RX ORDER — ACETAMINOPHEN 325 MG/1
TABLET ORAL
Status: DISPENSED
Start: 2023-11-25

## (undated) RX ORDER — OXYTOCIN/0.9 % SODIUM CHLORIDE 30/500 ML
PLASTIC BAG, INJECTION (ML) INTRAVENOUS
Status: DISPENSED
Start: 2023-11-25

## (undated) RX ORDER — PROPOFOL 10 MG/ML
INJECTION, EMULSION INTRAVENOUS
Status: DISPENSED
Start: 2023-11-25

## (undated) RX ORDER — NITROGLYCERIN 400 UG/1
SPRAY ORAL
Status: DISPENSED
Start: 2023-11-25

## (undated) RX ORDER — BUPIVACAINE HYDROCHLORIDE 2.5 MG/ML
INJECTION, SOLUTION EPIDURAL; INFILTRATION; INTRACAUDAL
Status: DISPENSED
Start: 2023-11-25

## (undated) RX ORDER — FENTANYL CITRATE-0.9 % NACL/PF 10 MCG/ML
PLASTIC BAG, INJECTION (ML) INTRAVENOUS
Status: DISPENSED
Start: 2023-11-25

## (undated) RX ORDER — NITROGLYCERIN 20 MG/100ML
INJECTION INTRAVENOUS
Status: DISPENSED
Start: 2023-11-25

## (undated) RX ORDER — PHENYLEPHRINE HCL IN 0.9% NACL 50MG/250ML
PLASTIC BAG, INJECTION (ML) INTRAVENOUS
Status: DISPENSED
Start: 2023-11-25

## (undated) RX ORDER — ACETAMINOPHEN 500 MG
TABLET ORAL
Status: DISPENSED
Start: 2023-11-25

## (undated) RX ORDER — TRANEXAMIC ACID 10 MG/ML
INJECTION, SOLUTION INTRAVENOUS
Status: DISPENSED
Start: 2023-11-25